# Patient Record
Sex: FEMALE | Race: WHITE | Employment: FULL TIME
[De-identification: names, ages, dates, MRNs, and addresses within clinical notes are randomized per-mention and may not be internally consistent; named-entity substitution may affect disease eponyms.]

---

## 2017-01-17 ENCOUNTER — OFFICE VISIT (OUTPATIENT)
Dept: FAMILY MEDICINE CLINIC | Facility: CLINIC | Age: 52
End: 2017-01-17

## 2017-01-17 VITALS
DIASTOLIC BLOOD PRESSURE: 80 MMHG | WEIGHT: 265 LBS | TEMPERATURE: 97.7 F | HEART RATE: 76 BPM | SYSTOLIC BLOOD PRESSURE: 122 MMHG | BODY MASS INDEX: 50.07 KG/M2

## 2017-01-17 DIAGNOSIS — J40 BRONCHITIS: Primary | ICD-10-CM

## 2017-01-17 DIAGNOSIS — Z23 NEEDS FLU SHOT: ICD-10-CM

## 2017-01-17 DIAGNOSIS — T36.95XA ANTIBIOTIC-INDUCED YEAST INFECTION: ICD-10-CM

## 2017-01-17 DIAGNOSIS — B37.9 ANTIBIOTIC-INDUCED YEAST INFECTION: ICD-10-CM

## 2017-01-17 PROCEDURE — G8484 FLU IMMUNIZE NO ADMIN: HCPCS | Performed by: NURSE PRACTITIONER

## 2017-01-17 PROCEDURE — 3017F COLORECTAL CA SCREEN DOC REV: CPT | Performed by: NURSE PRACTITIONER

## 2017-01-17 PROCEDURE — 90471 IMMUNIZATION ADMIN: CPT | Performed by: NURSE PRACTITIONER

## 2017-01-17 PROCEDURE — 1036F TOBACCO NON-USER: CPT | Performed by: NURSE PRACTITIONER

## 2017-01-17 PROCEDURE — 99213 OFFICE O/P EST LOW 20 MIN: CPT | Performed by: NURSE PRACTITIONER

## 2017-01-17 PROCEDURE — 3014F SCREEN MAMMO DOC REV: CPT | Performed by: NURSE PRACTITIONER

## 2017-01-17 PROCEDURE — G8427 DOCREV CUR MEDS BY ELIG CLIN: HCPCS | Performed by: NURSE PRACTITIONER

## 2017-01-17 PROCEDURE — 90688 IIV4 VACCINE SPLT 0.5 ML IM: CPT | Performed by: NURSE PRACTITIONER

## 2017-01-17 PROCEDURE — G8419 CALC BMI OUT NRM PARAM NOF/U: HCPCS | Performed by: NURSE PRACTITIONER

## 2017-01-17 RX ORDER — DOXYCYCLINE HYCLATE 100 MG
100 TABLET ORAL 2 TIMES DAILY
Qty: 20 TABLET | Refills: 0 | Status: SHIPPED | OUTPATIENT
Start: 2017-01-17 | End: 2017-01-27

## 2017-01-17 RX ORDER — ALBUTEROL SULFATE 90 UG/1
2 AEROSOL, METERED RESPIRATORY (INHALATION) EVERY 6 HOURS PRN
Qty: 1 INHALER | Refills: 3 | Status: SHIPPED | OUTPATIENT
Start: 2017-01-17 | End: 2018-11-30 | Stop reason: ALTCHOICE

## 2017-01-17 RX ORDER — FLUCONAZOLE 150 MG/1
150 TABLET ORAL DAILY
Qty: 3 TABLET | Refills: 0 | Status: SHIPPED | OUTPATIENT
Start: 2017-01-17 | End: 2017-01-20

## 2017-01-17 ASSESSMENT — ENCOUNTER SYMPTOMS
ABDOMINAL PAIN: 0
NAUSEA: 0
CONSTIPATION: 0
WHEEZING: 1
SORE THROAT: 0
SINUS PRESSURE: 0
RHINORRHEA: 1
CHEST TIGHTNESS: 1
SHORTNESS OF BREATH: 1
VOICE CHANGE: 1
COUGH: 1
DIARRHEA: 0

## 2017-01-17 ASSESSMENT — PATIENT HEALTH QUESTIONNAIRE - PHQ9
2. FEELING DOWN, DEPRESSED OR HOPELESS: 0
SUM OF ALL RESPONSES TO PHQ QUESTIONS 1-9: 0
SUM OF ALL RESPONSES TO PHQ9 QUESTIONS 1 & 2: 0
1. LITTLE INTEREST OR PLEASURE IN DOING THINGS: 0

## 2017-01-18 ENCOUNTER — PATIENT MESSAGE (OUTPATIENT)
Dept: FAMILY MEDICINE CLINIC | Facility: CLINIC | Age: 52
End: 2017-01-18

## 2017-01-18 ENCOUNTER — TELEPHONE (OUTPATIENT)
Dept: FAMILY MEDICINE CLINIC | Facility: CLINIC | Age: 52
End: 2017-01-18

## 2017-03-05 DIAGNOSIS — E55.9 VITAMIN D DEFICIENCY: Primary | ICD-10-CM

## 2017-03-05 DIAGNOSIS — E78.2 MIXED HYPERLIPIDEMIA: ICD-10-CM

## 2017-03-06 RX ORDER — ATORVASTATIN CALCIUM 20 MG/1
20 TABLET, FILM COATED ORAL DAILY
Qty: 30 TABLET | Refills: 1 | Status: SHIPPED | OUTPATIENT
Start: 2017-03-06 | End: 2017-05-05 | Stop reason: SDUPTHER

## 2017-03-22 ENCOUNTER — HOSPITAL ENCOUNTER (OUTPATIENT)
Age: 52
Setting detail: SPECIMEN
Discharge: HOME | End: 2017-03-22
Payer: COMMERCIAL

## 2017-03-22 ENCOUNTER — OFFICE VISIT (OUTPATIENT)
Dept: FAMILY MEDICINE CLINIC | Age: 52
End: 2017-03-22
Payer: COMMERCIAL

## 2017-03-22 VITALS
WEIGHT: 258 LBS | DIASTOLIC BLOOD PRESSURE: 82 MMHG | SYSTOLIC BLOOD PRESSURE: 118 MMHG | BODY MASS INDEX: 48.75 KG/M2 | RESPIRATION RATE: 18 BRPM | HEART RATE: 88 BPM | TEMPERATURE: 99.2 F

## 2017-03-22 DIAGNOSIS — E78.2 MIXED HYPERLIPIDEMIA: ICD-10-CM

## 2017-03-22 DIAGNOSIS — J20.9 ACUTE BRONCHITIS, UNSPECIFIED ORGANISM: Primary | ICD-10-CM

## 2017-03-22 DIAGNOSIS — R73.01 IMPAIRED FASTING BLOOD SUGAR: Primary | ICD-10-CM

## 2017-03-22 DIAGNOSIS — Z11.59 NEED FOR HEPATITIS C SCREENING TEST: ICD-10-CM

## 2017-03-22 DIAGNOSIS — E55.9 VITAMIN D DEFICIENCY: ICD-10-CM

## 2017-03-22 LAB
ALBUMIN SERPL-MCNC: 4.3 G/DL (ref 3.5–5.2)
ALBUMIN/GLOBULIN RATIO: 1.3 (ref 1–2.5)
ALP BLD-CCNC: 83 U/L (ref 35–104)
ALT SERPL-CCNC: 28 U/L (ref 5–33)
ANION GAP SERPL CALCULATED.3IONS-SCNC: 16 MMOL/L (ref 9–17)
AST SERPL-CCNC: 21 U/L
BILIRUB SERPL-MCNC: 0.4 MG/DL (ref 0.3–1.2)
BUN BLDV-MCNC: 15 MG/DL (ref 6–20)
BUN/CREAT BLD: ABNORMAL (ref 9–20)
CALCIUM SERPL-MCNC: 9.1 MG/DL (ref 8.6–10.4)
CHLORIDE BLD-SCNC: 102 MMOL/L (ref 98–107)
CHOLESTEROL/HDL RATIO: 2.8
CHOLESTEROL: 163 MG/DL
CO2: 23 MMOL/L (ref 20–31)
CREAT SERPL-MCNC: 0.51 MG/DL (ref 0.5–0.9)
GFR AFRICAN AMERICAN: >60 ML/MIN
GFR NON-AFRICAN AMERICAN: >60 ML/MIN
GFR SERPL CREATININE-BSD FRML MDRD: ABNORMAL ML/MIN/{1.73_M2}
GFR SERPL CREATININE-BSD FRML MDRD: ABNORMAL ML/MIN/{1.73_M2}
GLUCOSE BLD-MCNC: 103 MG/DL (ref 70–99)
HCT VFR BLD CALC: 38.4 % (ref 36–46)
HDLC SERPL-MCNC: 58 MG/DL
HEMOGLOBIN: 12.3 G/DL (ref 12–16)
HEPATITIS C ANTIBODY: NONREACTIVE
LDL CHOLESTEROL: 78 MG/DL (ref 0–130)
MCH RBC QN AUTO: 19.5 PG (ref 26–34)
MCHC RBC AUTO-ENTMCNC: 32.1 G/DL (ref 31–37)
MCV RBC AUTO: 60.8 FL (ref 80–100)
PDW BLD-RTO: 17.5 % (ref 12.5–15.4)
PLATELET # BLD: 222 K/UL (ref 140–450)
PMV BLD AUTO: 8.9 FL (ref 6–12)
POTASSIUM SERPL-SCNC: 4.3 MMOL/L (ref 3.7–5.3)
RBC # BLD: 6.31 M/UL (ref 4–5.2)
SODIUM BLD-SCNC: 141 MMOL/L (ref 135–144)
TOTAL PROTEIN: 7.6 G/DL (ref 6.4–8.3)
TRIGL SERPL-MCNC: 133 MG/DL
VITAMIN D 25-HYDROXY: 16.6 NG/ML (ref 30–100)
VLDLC SERPL CALC-MCNC: NORMAL MG/DL (ref 1–30)
WBC # BLD: 5 K/UL (ref 3.5–11)

## 2017-03-22 PROCEDURE — 99213 OFFICE O/P EST LOW 20 MIN: CPT | Performed by: NURSE PRACTITIONER

## 2017-03-22 PROCEDURE — 1036F TOBACCO NON-USER: CPT | Performed by: NURSE PRACTITIONER

## 2017-03-22 PROCEDURE — G8419 CALC BMI OUT NRM PARAM NOF/U: HCPCS | Performed by: NURSE PRACTITIONER

## 2017-03-22 PROCEDURE — G8427 DOCREV CUR MEDS BY ELIG CLIN: HCPCS | Performed by: NURSE PRACTITIONER

## 2017-03-22 PROCEDURE — 3014F SCREEN MAMMO DOC REV: CPT | Performed by: NURSE PRACTITIONER

## 2017-03-22 PROCEDURE — 3017F COLORECTAL CA SCREEN DOC REV: CPT | Performed by: NURSE PRACTITIONER

## 2017-03-22 PROCEDURE — G8484 FLU IMMUNIZE NO ADMIN: HCPCS | Performed by: NURSE PRACTITIONER

## 2017-03-22 RX ORDER — METHYLPREDNISOLONE 4 MG/1
TABLET ORAL
Qty: 1 KIT | Refills: 0 | Status: SHIPPED | OUTPATIENT
Start: 2017-03-22 | End: 2017-03-28

## 2017-03-22 RX ORDER — AMOXICILLIN AND CLAVULANATE POTASSIUM 875; 125 MG/1; MG/1
1 TABLET, FILM COATED ORAL 2 TIMES DAILY
Qty: 20 TABLET | Refills: 0 | Status: SHIPPED | OUTPATIENT
Start: 2017-03-22 | End: 2017-04-01

## 2017-03-22 ASSESSMENT — ENCOUNTER SYMPTOMS
NAUSEA: 0
ABDOMINAL PAIN: 0
SINUS PRESSURE: 0
WHEEZING: 1
VOICE CHANGE: 1
VOMITING: 0
SINUS PAIN: 0
SORE THROAT: 1
CHEST TIGHTNESS: 1
SHORTNESS OF BREATH: 1
COUGH: 1
DIARRHEA: 0
RHINORRHEA: 0

## 2017-03-23 LAB
ESTIMATED AVERAGE GLUCOSE: 114 MG/DL
HBA1C MFR BLD: 5.6 % (ref 4–6)

## 2017-04-19 ENCOUNTER — OFFICE VISIT (OUTPATIENT)
Dept: FAMILY MEDICINE CLINIC | Age: 52
End: 2017-04-19
Payer: COMMERCIAL

## 2017-04-19 VITALS
HEART RATE: 72 BPM | DIASTOLIC BLOOD PRESSURE: 88 MMHG | SYSTOLIC BLOOD PRESSURE: 130 MMHG | WEIGHT: 261 LBS | HEIGHT: 61 IN | TEMPERATURE: 97.4 F | RESPIRATION RATE: 20 BRPM | BODY MASS INDEX: 49.28 KG/M2

## 2017-04-19 DIAGNOSIS — B00.1 COLD SORE: ICD-10-CM

## 2017-04-19 DIAGNOSIS — J06.9 URI, ACUTE: Primary | ICD-10-CM

## 2017-04-19 DIAGNOSIS — R05.9 COUGH: ICD-10-CM

## 2017-04-19 PROCEDURE — 99213 OFFICE O/P EST LOW 20 MIN: CPT | Performed by: NURSE PRACTITIONER

## 2017-04-19 PROCEDURE — 1036F TOBACCO NON-USER: CPT | Performed by: NURSE PRACTITIONER

## 2017-04-19 PROCEDURE — 3017F COLORECTAL CA SCREEN DOC REV: CPT | Performed by: NURSE PRACTITIONER

## 2017-04-19 PROCEDURE — 3014F SCREEN MAMMO DOC REV: CPT | Performed by: NURSE PRACTITIONER

## 2017-04-19 PROCEDURE — G8419 CALC BMI OUT NRM PARAM NOF/U: HCPCS | Performed by: NURSE PRACTITIONER

## 2017-04-19 PROCEDURE — G8427 DOCREV CUR MEDS BY ELIG CLIN: HCPCS | Performed by: NURSE PRACTITIONER

## 2017-04-19 RX ORDER — GUAIFENESIN 600 MG/1
600 TABLET, EXTENDED RELEASE ORAL 2 TIMES DAILY
Qty: 20 TABLET | Refills: 0 | Status: SHIPPED | OUTPATIENT
Start: 2017-04-19 | End: 2017-04-29

## 2017-04-19 RX ORDER — DOCOSANOL 100 MG/G
CREAM TOPICAL
Qty: 1 TUBE | Refills: 0 | COMMUNITY
Start: 2017-04-19 | End: 2017-04-26

## 2017-04-19 ASSESSMENT — ENCOUNTER SYMPTOMS
SINUS PAIN: 0
EYE DISCHARGE: 0
RHINORRHEA: 0
CONSTIPATION: 0
TROUBLE SWALLOWING: 0
SHORTNESS OF BREATH: 1
VOMITING: 0
COUGH: 1
ABDOMINAL PAIN: 0
EYE REDNESS: 0
SWOLLEN GLANDS: 0
SORE THROAT: 0
DIARRHEA: 0
WHEEZING: 1

## 2017-05-05 DIAGNOSIS — E78.2 MIXED HYPERLIPIDEMIA: ICD-10-CM

## 2017-05-05 RX ORDER — ATORVASTATIN CALCIUM 20 MG/1
20 TABLET, FILM COATED ORAL DAILY
Qty: 30 TABLET | Refills: 2 | Status: SHIPPED | OUTPATIENT
Start: 2017-05-05 | End: 2017-08-07 | Stop reason: SDUPTHER

## 2017-08-07 DIAGNOSIS — E78.2 MIXED HYPERLIPIDEMIA: ICD-10-CM

## 2017-08-07 RX ORDER — ATORVASTATIN CALCIUM 20 MG/1
20 TABLET, FILM COATED ORAL DAILY
Qty: 30 TABLET | Refills: 5 | Status: SHIPPED | OUTPATIENT
Start: 2017-08-07 | End: 2018-02-15 | Stop reason: SDUPTHER

## 2017-09-26 ENCOUNTER — OFFICE VISIT (OUTPATIENT)
Dept: FAMILY MEDICINE CLINIC | Age: 52
End: 2017-09-26
Payer: COMMERCIAL

## 2017-09-26 VITALS
WEIGHT: 258.6 LBS | RESPIRATION RATE: 16 BRPM | DIASTOLIC BLOOD PRESSURE: 80 MMHG | SYSTOLIC BLOOD PRESSURE: 130 MMHG | HEIGHT: 61 IN | BODY MASS INDEX: 48.82 KG/M2 | HEART RATE: 80 BPM

## 2017-09-26 DIAGNOSIS — H00.014 HORDEOLUM EXTERNUM OF LEFT UPPER EYELID: Primary | ICD-10-CM

## 2017-09-26 PROCEDURE — G8427 DOCREV CUR MEDS BY ELIG CLIN: HCPCS | Performed by: INTERNAL MEDICINE

## 2017-09-26 PROCEDURE — 3014F SCREEN MAMMO DOC REV: CPT | Performed by: INTERNAL MEDICINE

## 2017-09-26 PROCEDURE — 1036F TOBACCO NON-USER: CPT | Performed by: INTERNAL MEDICINE

## 2017-09-26 PROCEDURE — 3017F COLORECTAL CA SCREEN DOC REV: CPT | Performed by: INTERNAL MEDICINE

## 2017-09-26 PROCEDURE — 99212 OFFICE O/P EST SF 10 MIN: CPT | Performed by: INTERNAL MEDICINE

## 2017-09-26 PROCEDURE — G8417 CALC BMI ABV UP PARAM F/U: HCPCS | Performed by: INTERNAL MEDICINE

## 2017-09-26 RX ORDER — BACITRACIN 500 [USP'U]/G
OINTMENT OPHTHALMIC 3 TIMES DAILY
Qty: 1 TUBE | Refills: 0 | Status: SHIPPED | OUTPATIENT
Start: 2017-09-26 | End: 2017-10-01

## 2017-09-26 ASSESSMENT — ENCOUNTER SYMPTOMS: SORE THROAT: 0

## 2017-09-27 ENCOUNTER — TELEPHONE (OUTPATIENT)
Dept: FAMILY MEDICINE CLINIC | Age: 52
End: 2017-09-27

## 2017-09-27 ENCOUNTER — NURSE TRIAGE (OUTPATIENT)
Dept: OTHER | Age: 52
End: 2017-09-27

## 2017-10-17 DIAGNOSIS — R22.43 LOCALIZED SWELLING OF BOTH LOWER LEGS: ICD-10-CM

## 2017-10-18 NOTE — TELEPHONE ENCOUNTER
Why does she need this prescription? Has swelling in legs become a problem again? Usually it gets worse in warmer months and better in cooler. Just wondering if I need to look into other causes of swelling. Is she trying to use it for weight loss?

## 2017-10-18 NOTE — TELEPHONE ENCOUNTER
LV 5/5/16  LRF 8/7/17    Health Maintenance   Topic Date Due    HIV screen  09/15/1980    DTaP/Tdap/Td vaccine (1 - Tdap) 09/15/1984    Cervical cancer screen  03/01/2015    Flu vaccine (1) 09/01/2017    Breast cancer screen  05/20/2018    Diabetes screen  03/22/2020    Lipid screen  03/22/2022    Colon cancer screen colonoscopy  07/21/2027    Hepatitis C screen  Completed             (applicable per patient's age: Cancer Screenings, Depression Screening, Fall Risk Screening, Immunizations)    Hemoglobin A1C (%)   Date Value   03/22/2017 5.6   02/03/2016 5.4   10/16/2013 5.8     LDL Cholesterol (mg/dL)   Date Value   03/22/2017 78     LDL Calculated (mg/dL)   Date Value   08/06/2014 111     AST (U/L)   Date Value   03/22/2017 21     ALT (U/L)   Date Value   03/22/2017 28     BUN (mg/dL)   Date Value   03/22/2017 15      (goal A1C is < 7)   (goal LDL is <100) need 30-50% reduction from baseline     BP Readings from Last 3 Encounters:   09/26/17 130/80   04/19/17 130/88   03/22/17 118/82    (goal /80)      All Future Testing planned in [facility]:  Lab Frequency Next Occurrence       Next Visit Date:  Future Appointments  Date Time Provider Zay Canas   11/3/2017 1:00 PM JUAN FRANCISCO Dela Cruz [facility]            Patient Active Problem List:     GERD (gastroesophageal reflux disease)     Hyperlipidemia     Gastric ulcer     Beta thalassemia minor     Varicose veins     Lumbago     Vitamin D deficiency     Traumatic compression fracture of T11 thoracic vertebra ([facility] [address]

## 2017-10-19 RX ORDER — FUROSEMIDE 20 MG/1
20 TABLET ORAL DAILY
Qty: 30 TABLET | Refills: 2 | Status: SHIPPED | OUTPATIENT
Start: 2017-10-19 | End: 2020-11-17

## 2017-11-03 ENCOUNTER — HOSPITAL ENCOUNTER (OUTPATIENT)
Age: 52
Setting detail: SPECIMEN
Discharge: HOME | End: 2017-11-03
Payer: COMMERCIAL

## 2017-11-03 ENCOUNTER — OFFICE VISIT (OUTPATIENT)
Dept: FAMILY MEDICINE CLINIC | Age: 52
End: 2017-11-03
Payer: COMMERCIAL

## 2017-11-03 VITALS
HEART RATE: 82 BPM | TEMPERATURE: 97.5 F | WEIGHT: 265.8 LBS | RESPIRATION RATE: 22 BRPM | BODY MASS INDEX: 50.18 KG/M2 | HEIGHT: 61 IN | DIASTOLIC BLOOD PRESSURE: 88 MMHG | SYSTOLIC BLOOD PRESSURE: 123 MMHG

## 2017-11-03 DIAGNOSIS — Z23 NEED FOR DIPHTHERIA-TETANUS-PERTUSSIS (TDAP) VACCINE: ICD-10-CM

## 2017-11-03 DIAGNOSIS — E55.9 VITAMIN D DEFICIENCY: ICD-10-CM

## 2017-11-03 DIAGNOSIS — Z00.00 ROUTINE GENERAL MEDICAL EXAMINATION AT A HEALTH CARE FACILITY: ICD-10-CM

## 2017-11-03 DIAGNOSIS — M85.88 OSTEOPENIA OF SPINE: ICD-10-CM

## 2017-11-03 DIAGNOSIS — R07.9 CHEST PAIN, UNSPECIFIED TYPE: ICD-10-CM

## 2017-11-03 DIAGNOSIS — R63.5 ABNORMAL WEIGHT GAIN: ICD-10-CM

## 2017-11-03 DIAGNOSIS — E78.00 PURE HYPERCHOLESTEROLEMIA: ICD-10-CM

## 2017-11-03 DIAGNOSIS — Z00.00 ROUTINE GENERAL MEDICAL EXAMINATION AT A HEALTH CARE FACILITY: Primary | ICD-10-CM

## 2017-11-03 LAB
ALBUMIN SERPL-MCNC: 4.3 G/DL (ref 3.5–5.2)
ALBUMIN/GLOBULIN RATIO: 1.6 (ref 1–2.5)
ALP BLD-CCNC: 81 U/L (ref 35–104)
ALT SERPL-CCNC: 25 U/L (ref 5–33)
ANION GAP SERPL CALCULATED.3IONS-SCNC: 10 MMOL/L (ref 9–17)
AST SERPL-CCNC: 21 U/L
BILIRUB SERPL-MCNC: 0.63 MG/DL (ref 0.3–1.2)
BUN BLDV-MCNC: 12 MG/DL (ref 6–20)
BUN/CREAT BLD: ABNORMAL (ref 9–20)
CALCIUM SERPL-MCNC: 9.5 MG/DL (ref 8.6–10.4)
CHLORIDE BLD-SCNC: 100 MMOL/L (ref 98–107)
CHOLESTEROL/HDL RATIO: 3.2
CHOLESTEROL: 208 MG/DL
CO2: 27 MMOL/L (ref 20–31)
CREAT SERPL-MCNC: 0.49 MG/DL (ref 0.5–0.9)
ESTIMATED AVERAGE GLUCOSE: 111 MG/DL
GFR AFRICAN AMERICAN: >60 ML/MIN
GFR NON-AFRICAN AMERICAN: >60 ML/MIN
GFR SERPL CREATININE-BSD FRML MDRD: ABNORMAL ML/MIN/{1.73_M2}
GFR SERPL CREATININE-BSD FRML MDRD: ABNORMAL ML/MIN/{1.73_M2}
GLUCOSE BLD-MCNC: 94 MG/DL (ref 70–99)
HBA1C MFR BLD: 5.5 % (ref 4–6)
HCT VFR BLD CALC: 41 % (ref 36.3–47.1)
HDLC SERPL-MCNC: 66 MG/DL
HEMOGLOBIN: 12 G/DL (ref 11.9–15.1)
LDL CHOLESTEROL: 106 MG/DL (ref 0–130)
MCH RBC QN AUTO: 19.4 PG (ref 25.2–33.5)
MCHC RBC AUTO-ENTMCNC: 29.3 G/DL (ref 29.9–34.7)
MCV RBC AUTO: 66.5 FL (ref 82.6–102.9)
PDW BLD-RTO: 17.4 % (ref 11.8–14.4)
PLATELET # BLD: 322 K/UL (ref 138–453)
PMV BLD AUTO: 10.7 FL (ref 8.1–13.5)
POTASSIUM SERPL-SCNC: 4.7 MMOL/L (ref 3.7–5.3)
RBC # BLD: 6.17 M/UL (ref 3.95–5.11)
SODIUM BLD-SCNC: 137 MMOL/L (ref 135–144)
TOTAL PROTEIN: 7 G/DL (ref 6.4–8.3)
TRIGL SERPL-MCNC: 179 MG/DL
VITAMIN D 25-HYDROXY: 21.1 NG/ML (ref 30–100)
VLDLC SERPL CALC-MCNC: ABNORMAL MG/DL (ref 1–30)
WBC # BLD: 6.1 K/UL (ref 3.5–11.3)

## 2017-11-03 PROCEDURE — 93000 ELECTROCARDIOGRAM COMPLETE: CPT | Performed by: NURSE PRACTITIONER

## 2017-11-03 PROCEDURE — 99396 PREV VISIT EST AGE 40-64: CPT | Performed by: NURSE PRACTITIONER

## 2017-11-03 PROCEDURE — 90471 IMMUNIZATION ADMIN: CPT | Performed by: NURSE PRACTITIONER

## 2017-11-03 PROCEDURE — 90715 TDAP VACCINE 7 YRS/> IM: CPT | Performed by: NURSE PRACTITIONER

## 2017-11-03 RX ORDER — PHENTERMINE HYDROCHLORIDE 37.5 MG/1
37.5 TABLET ORAL
Qty: 30 TABLET | Refills: 0 | Status: SHIPPED | OUTPATIENT
Start: 2017-11-03 | End: 2017-12-08 | Stop reason: SDUPTHER

## 2017-11-03 RX ORDER — NAPROXEN 500 MG/1
500 TABLET ORAL 2 TIMES DAILY PRN
Qty: 60 TABLET | Refills: 3 | Status: SHIPPED | OUTPATIENT
Start: 2017-11-03 | End: 2018-03-12 | Stop reason: SDUPTHER

## 2017-11-03 ASSESSMENT — ENCOUNTER SYMPTOMS
EYE PAIN: 0
RHINORRHEA: 0
ABDOMINAL PAIN: 0
TROUBLE SWALLOWING: 0
BACK PAIN: 0
CONSTIPATION: 0
VOMITING: 0
SORE THROAT: 0
EYE DISCHARGE: 0
SHORTNESS OF BREATH: 0
COUGH: 0
DIARRHEA: 0
NAUSEA: 0
WHEEZING: 0

## 2017-11-03 NOTE — PROGRESS NOTES
Subjective:      Patient ID: Leni Oppenheim is a 46 y.o. female. Visit Information    Have you changed or started any medications since your last visit including any over-the-counter medicines, vitamins, or herbal medicines? no   Are you having any side effects from any of your medications? -  no  Have you stopped taking any of your medications? Is so, why? -  no    Have you seen any other physician or provider since your last visit? Yes - Records Obtained  Have you had any other diagnostic tests since your last visit? Yes - Records Obtained  Have you been seen in the emergency room and/or had an admission to a hospital since we last saw you? Yes - Records Obtained  Have you had your routine dental cleaning in the past 6 months? yes - routinely     Have you activated your [employer] account? If not, what are your barriers? Yes     Patient Care Team:  Alejandra Tafoya MD as PCP - General    Medical History Review  Past Medical, Family, and Social History reviewed and does contribute to the patient presenting condition    Health Maintenance   Topic Date Due    HIV screen  09/15/1980    DTaP/Tdap/Td vaccine (1 - Tdap) 09/15/1984    Cervical cancer screen  03/01/2015    Flu vaccine (1) 09/01/2017    Breast cancer screen  05/20/2018    Diabetes screen  03/22/2020    Lipid screen  03/22/2022    Colon cancer screen colonoscopy  07/21/2027    Hepatitis C screen  Completed     Patient present in office today for routine follow up on chronic conditions including: Gerd, hyperlipidemia, chronic back pain, and mood dysphoria. Has concerns of back pain, hand aches, and increasing weight. Pap wasn't done by Elver Kim in May due to total hysterectomy. Mammogram completed in May. Prabha Jenkins in October, X-rayed her hand and arm. Negative for fractures. Refused flu shot. Chest pain with stress in the last few weeks. Happening a few times a week. Pressure. Non radiating. Sometimes last all day. Hasn't had an EKG recently.  No other received counseling on the following healthy behaviors: nutrition, exercise and medication adherence  2. Reviewed prior labs and health maintenance  3. Continue current medications, diet and exercise. 4.  Discussed use, benefit, and side effects of prescribed medications. Barriers to medication compliance addressed. All patient questions answered. Pt voiced understanding. 5.  Patient given educational materials - see patient instructions  6. Was a self-tracking handout given in paper form or via [employer]? No  If yes, see orders or list here.     PHQ Scores 1/17/2017   PHQ2 Score 0   PHQ9 Score 0     Interpretation of Total Score Depression Severity: 1-4 = Minimal depression, 5-9 = Mild depression, 10-14 = Moderate depression, 15-19 = Moderately severe depression, 20-27 = Severe depression  Normal    Completed Refills   Requested Prescriptions     Pending Prescriptions Disp Refills    Cholecalciferol (VITAMIN D3) 5000 units TABS 30 tablet 5     Sig: Take 1 tablet by mouth daily    phentermine (ADIPEX-P) 37.5 MG tablet 30 tablet 0     Sig: Take 1 tablet by mouth every morning (before breakfast)     Signed Prescriptions Disp Refills    naproxen (NAPROSYN) 500 MG tablet 60 tablet 3     Sig: Take 1 tablet by mouth 2 times daily as needed for Pain

## 2017-12-07 ENCOUNTER — OFFICE VISIT (OUTPATIENT)
Dept: FAMILY MEDICINE CLINIC | Age: 52
End: 2017-12-07
Payer: COMMERCIAL

## 2017-12-07 VITALS
HEART RATE: 75 BPM | TEMPERATURE: 97.1 F | DIASTOLIC BLOOD PRESSURE: 84 MMHG | SYSTOLIC BLOOD PRESSURE: 125 MMHG | BODY MASS INDEX: 49.2 KG/M2 | WEIGHT: 260.4 LBS | RESPIRATION RATE: 20 BRPM

## 2017-12-07 DIAGNOSIS — R68.89 ABNORMAL WEIGHT: Primary | ICD-10-CM

## 2017-12-07 PROCEDURE — G8427 DOCREV CUR MEDS BY ELIG CLIN: HCPCS | Performed by: NURSE PRACTITIONER

## 2017-12-07 PROCEDURE — 3014F SCREEN MAMMO DOC REV: CPT | Performed by: NURSE PRACTITIONER

## 2017-12-07 PROCEDURE — 99214 OFFICE O/P EST MOD 30 MIN: CPT | Performed by: NURSE PRACTITIONER

## 2017-12-07 PROCEDURE — G8417 CALC BMI ABV UP PARAM F/U: HCPCS | Performed by: NURSE PRACTITIONER

## 2017-12-07 PROCEDURE — 1036F TOBACCO NON-USER: CPT | Performed by: NURSE PRACTITIONER

## 2017-12-07 PROCEDURE — G8484 FLU IMMUNIZE NO ADMIN: HCPCS | Performed by: NURSE PRACTITIONER

## 2017-12-07 PROCEDURE — 3017F COLORECTAL CA SCREEN DOC REV: CPT | Performed by: NURSE PRACTITIONER

## 2017-12-07 ASSESSMENT — ENCOUNTER SYMPTOMS
TROUBLE SWALLOWING: 0
CONSTIPATION: 0
BACK PAIN: 0
SHORTNESS OF BREATH: 0
RHINORRHEA: 0
ABDOMINAL PAIN: 0
NAUSEA: 0
VOMITING: 0
SORE THROAT: 0
COUGH: 0
DIARRHEA: 0

## 2017-12-07 NOTE — PROGRESS NOTES
phentermine (ADIPEX-P) 37.5 MG tablet           Plan:      Continue Adipex daily as prescribed  Continue to work on healthier diet  Needs to work on regular exercise  Body mass index is 49.2 kg/m². Encouraged continued weight loss. Call office with concerns        1. Patricia Ash received counseling on the following healthy behaviors: nutrition, exercise and medication adherence  2. Reviewed prior labs and health maintenance  3. Continue current medications, diet and exercise. 4.  Discussed use, benefit, and side effects of prescribed medications. Barriers to medication compliance addressed. All patient questions answered. Pt voiced understanding. 5.  Patient given educational materials - see patient instructions  6. Was a self-tracking handout given in paper form or via [employer]? No  If yes, see orders or list here. PHQ Scores 1/17/2017   PHQ2 Score 0   PHQ9 Score 0     Interpretation of Total Score Depression Severity: 1-4 = Minimal depression, 5-9 = Mild depression, 10-14 = Moderate depression, 15-19 = Moderately severe depression, 20-27 = Severe depression  Normal    Completed Refills   Requested Prescriptions     Signed Prescriptions Disp Refills    phentermine (ADIPEX-P) 37.5 MG tablet 30 tablet 0     Sig: Take 1 tablet by mouth every morning (before breakfast) .

## 2017-12-08 RX ORDER — PHENTERMINE HYDROCHLORIDE 37.5 MG/1
37.5 TABLET ORAL
Qty: 30 TABLET | Refills: 0 | Status: SHIPPED | OUTPATIENT
Start: 2017-12-08 | End: 2018-01-07

## 2018-01-20 ENCOUNTER — OFFICE VISIT (OUTPATIENT)
Dept: FAMILY MEDICINE CLINIC | Age: 53
End: 2018-01-20
Payer: COMMERCIAL

## 2018-01-20 VITALS
DIASTOLIC BLOOD PRESSURE: 84 MMHG | HEIGHT: 61 IN | BODY MASS INDEX: 49.47 KG/M2 | WEIGHT: 262 LBS | TEMPERATURE: 98 F | OXYGEN SATURATION: 96 % | SYSTOLIC BLOOD PRESSURE: 126 MMHG | HEART RATE: 78 BPM

## 2018-01-20 DIAGNOSIS — J02.9 SORE THROAT: Primary | ICD-10-CM

## 2018-01-20 DIAGNOSIS — R52 BODY ACHES: ICD-10-CM

## 2018-01-20 DIAGNOSIS — J10.1 INFLUENZA B: ICD-10-CM

## 2018-01-20 PROCEDURE — 87880 STREP A ASSAY W/OPTIC: CPT | Performed by: NURSE PRACTITIONER

## 2018-01-20 PROCEDURE — 87804 INFLUENZA ASSAY W/OPTIC: CPT | Performed by: NURSE PRACTITIONER

## 2018-01-20 PROCEDURE — 99213 OFFICE O/P EST LOW 20 MIN: CPT | Performed by: NURSE PRACTITIONER

## 2018-01-20 RX ORDER — OSELTAMIVIR PHOSPHATE 75 MG/1
75 CAPSULE ORAL 2 TIMES DAILY
Qty: 10 CAPSULE | Refills: 0 | Status: SHIPPED | OUTPATIENT
Start: 2018-01-20 | End: 2018-01-25

## 2018-01-20 ASSESSMENT — ENCOUNTER SYMPTOMS
NAUSEA: 1
SORE THROAT: 1
VOMITING: 0
EYES NEGATIVE: 1
SHORTNESS OF BREATH: 0
COUGH: 1
TROUBLE SWALLOWING: 1

## 2018-01-20 ASSESSMENT — PATIENT HEALTH QUESTIONNAIRE - PHQ9
2. FEELING DOWN, DEPRESSED OR HOPELESS: 0
SUM OF ALL RESPONSES TO PHQ9 QUESTIONS 1 & 2: 0
SUM OF ALL RESPONSES TO PHQ QUESTIONS 1-9: 0
1. LITTLE INTEREST OR PLEASURE IN DOING THINGS: 0

## 2018-01-20 NOTE — PROGRESS NOTES
10    Beta thalassemia minor     Depression     MILD    Diverticulitis     past    E-coli UTI     Fall     AT HOME 3 WEEKS AGO    Gastric ulcer     resolved    GERD (gastroesophageal reflux disease)     resolved    Hyperlipidemia     PONV (postoperative nausea and vomiting)     REQUEST PREOP MEDS FOR N/V    Snores     Wears glasses      Past Surgical History:   Procedure Laterality Date    BACK SURGERY       SECTION      CHOLECYSTECTOMY      ENDOSCOPY, COLON, DIAGNOSTIC      HYSTERECTOMY      LUMBAR DISCECTOMY      L3-4    OTHER SURGICAL HISTORY  2015    vertebral augmentation T11    SHOULDER SURGERY Left     fx    UPPER GASTROINTESTINAL ENDOSCOPY  2011       Family History   Problem Relation Age of Onset    Diabetes Sister     Other Sister        Social History   Substance Use Topics    Smoking status: Former Smoker     Packs/day: 0.50     Years: 6.00     Types: Cigarettes     Quit date: 2011    Smokeless tobacco: Never Used    Alcohol use Yes      Comment: rarely uses this     Current Outpatient Prescriptions   Medication Sig Dispense Refill    oseltamivir (TAMIFLU) 75 MG capsule Take 1 capsule by mouth 2 times daily for 5 days 10 capsule 0    Cholecalciferol (VITAMIN D3) 5000 units TABS Take 1 tablet by mouth daily 30 tablet 5    naproxen (NAPROSYN) 500 MG tablet Take 1 tablet by mouth 2 times daily as needed for Pain 60 tablet 3    furosemide (LASIX) 20 MG tablet Take 1 tablet by mouth daily 30 tablet 2    atorvastatin (LIPITOR) 20 MG tablet Take 1 tablet by mouth daily 30 tablet 5    albuterol sulfate HFA (VENTOLIN HFA) 108 (90 BASE) MCG/ACT inhaler Inhale 2 puffs into the lungs every 6 hours as needed for Wheezing 1 Inhaler 3    citalopram (CELEXA) 10 MG tablet Take 10 mg by mouth nightly        No current facility-administered medications for this visit.       Allergies   Allergen Reactions    Percocet [Oxycodone-Acetaminophen] Itching    Tramadol Itching    Vicodin [Hydrocodone-Acetaminophen] Itching    Codeine Itching       Health Maintenance   Topic Date Due    HIV screen  09/15/1980    Cervical cancer screen  03/01/2015    Flu vaccine (1) 02/14/2018 (Originally 9/1/2017)    Breast cancer screen  05/20/2018    Potassium monitoring  11/03/2018    Creatinine monitoring  11/03/2018    Lipid screen  11/03/2022    Colon cancer screen colonoscopy  07/21/2027    DTaP/Tdap/Td vaccine (2 - Td) 11/03/2027    Hepatitis C screen  Completed       Review of Systems   Constitutional: Positive for chills, fatigue and fever. HENT: Positive for congestion, sore throat and trouble swallowing. Eyes: Negative. Respiratory: Positive for cough. Negative for shortness of breath. Cardiovascular: Negative for chest pain. Gastrointestinal: Positive for anorexia and nausea. Negative for vomiting. Genitourinary: Negative. Musculoskeletal: Positive for myalgias. Skin: Negative. Neurological: Negative. Psychiatric/Behavioral: Negative. Physical Exam   Constitutional: She is oriented to person, place, and time. Vital signs are normal. She appears well-developed and well-nourished. No distress. HENT:   Head: Normocephalic and atraumatic. Right Ear: Hearing, tympanic membrane, external ear and ear canal normal.   Left Ear: Hearing, tympanic membrane, external ear and ear canal normal.   Nose: Right sinus exhibits no maxillary sinus tenderness and no frontal sinus tenderness. Left sinus exhibits no maxillary sinus tenderness and no frontal sinus tenderness. Mouth/Throat: Oropharynx is clear and moist and mucous membranes are normal. No posterior oropharyngeal erythema. Eyes: Conjunctivae are normal.   Neck: Trachea normal, normal range of motion and full passive range of motion without pain. Neck supple. Cardiovascular: Normal rate, regular rhythm and normal heart sounds.     Pulmonary/Chest: Effort normal and breath sounds normal. No respiratory distress. Abdominal: Soft. Bowel sounds are normal.   Musculoskeletal: Normal range of motion. Neurological: She is alert and oriented to person, place, and time. Skin: Skin is warm and dry. She is not diaphoretic. Psychiatric: She has a normal mood and affect. Her behavior is normal. Judgment and thought content normal.     /84   Pulse 78   Temp 98 °F (36.7 °C) (Oral)   Ht 5' 1\" (1.549 m)   Wt 262 lb (118.8 kg)   LMP 09/11/2002   SpO2 96%   BMI 49.50 kg/m²          1. Sore throat  POCT rapid strep A   2. Body aches     3. Influenza B  POCT rapid strep A    POCT Influenza A/B    oseltamivir (TAMIFLU) 75 MG capsule         Orders Placed This Encounter   Procedures    POCT rapid strep A    POCT Influenza A/B     Orders Placed This Encounter   Medications    oseltamivir (TAMIFLU) 75 MG capsule     Sig: Take 1 capsule by mouth 2 times daily for 5 days     Dispense:  10 capsule     Refill:  0       Recommended rest, fluids and tylenol for body aches. Patient given educational materials - see patient instructions. Discussed use, benefit, and side effects of prescribed medications. All patient questions were answered. Pt voiced understanding. Patient agreed with treatment plan. Follow up as directed. Patient instructed to go immediately to ED if feeling worse or follow up with PCP if symptoms do not improve.     Electronically signed by Dion Xie NP on 1/20/2018 at 2:49 PM

## 2018-01-22 ENCOUNTER — TELEPHONE (OUTPATIENT)
Dept: FAMILY MEDICINE CLINIC | Age: 53
End: 2018-01-22

## 2018-01-22 NOTE — TELEPHONE ENCOUNTER
Patient called in because she was diagnosed with Influenza A on Saturday and she has taken two doses. Patient states that when she takes the medication she feels nauseous, dizzy, and almost \"drunk\". Patient is wondering if she should continue taking it or stop the medication. Health Maintenance   Topic Date Due    HIV screen  09/15/1980    Cervical cancer screen  03/01/2015    Flu vaccine (1) 02/14/2018 (Originally 9/1/2017)    Breast cancer screen  05/20/2018    Potassium monitoring  11/03/2018    Creatinine monitoring  11/03/2018    Lipid screen  11/03/2022    Colon cancer screen colonoscopy  07/21/2027    DTaP/Tdap/Td vaccine (2 - Td) 11/03/2027    Hepatitis C screen  Completed             (applicable per patient's age: Cancer Screenings, Depression Screening, Fall Risk Screening, Immunizations)    Hemoglobin A1C (%)   Date Value   11/03/2017 5.5   03/22/2017 5.6   02/03/2016 5.4     LDL Cholesterol (mg/dL)   Date Value   11/03/2017 106     LDL Calculated (mg/dL)   Date Value   08/06/2014 111     AST (U/L)   Date Value   11/03/2017 21     ALT (U/L)   Date Value   11/03/2017 25     BUN (mg/dL)   Date Value   11/03/2017 12      (goal A1C is < 7)   (goal LDL is <100) need 30-50% reduction from baseline     BP Readings from Last 3 Encounters:   01/20/18 126/84   12/07/17 125/84   11/03/17 123/88    (goal /80)      All Future Testing planned in [facility]:  Lab Frequency Next Occurrence       Next Visit Date:  No future appointments.          Patient Active Problem List:     GERD (gastroesophageal reflux disease)     Hyperlipidemia     Gastric ulcer     Beta thalassemia minor     Varicose veins     Lumbago     Vitamin D deficiency

## 2018-02-15 DIAGNOSIS — E78.2 MIXED HYPERLIPIDEMIA: ICD-10-CM

## 2018-02-16 RX ORDER — ATORVASTATIN CALCIUM 20 MG/1
20 TABLET, FILM COATED ORAL DAILY
Qty: 30 TABLET | Refills: 5 | Status: SHIPPED | OUTPATIENT
Start: 2018-02-16 | End: 2018-08-18 | Stop reason: SDUPTHER

## 2018-03-13 RX ORDER — NAPROXEN 500 MG/1
500 TABLET ORAL 2 TIMES DAILY PRN
Qty: 60 TABLET | Refills: 5 | Status: SHIPPED | OUTPATIENT
Start: 2018-03-13 | End: 2020-11-17

## 2018-04-25 ENCOUNTER — OFFICE VISIT (OUTPATIENT)
Dept: FAMILY MEDICINE CLINIC | Age: 53
End: 2018-04-25
Payer: COMMERCIAL

## 2018-04-25 VITALS
SYSTOLIC BLOOD PRESSURE: 134 MMHG | TEMPERATURE: 98.9 F | RESPIRATION RATE: 18 BRPM | WEIGHT: 278 LBS | BODY MASS INDEX: 52.53 KG/M2 | DIASTOLIC BLOOD PRESSURE: 78 MMHG | HEART RATE: 68 BPM

## 2018-04-25 DIAGNOSIS — B36.9 FUNGAL DERMATITIS: Primary | ICD-10-CM

## 2018-04-25 DIAGNOSIS — T75.3XXA SEA SICKNESS, INITIAL ENCOUNTER: ICD-10-CM

## 2018-04-25 DIAGNOSIS — Z12.39 BREAST CANCER SCREENING: ICD-10-CM

## 2018-04-25 PROCEDURE — G8427 DOCREV CUR MEDS BY ELIG CLIN: HCPCS | Performed by: NURSE PRACTITIONER

## 2018-04-25 PROCEDURE — 1036F TOBACCO NON-USER: CPT | Performed by: NURSE PRACTITIONER

## 2018-04-25 PROCEDURE — 99213 OFFICE O/P EST LOW 20 MIN: CPT | Performed by: NURSE PRACTITIONER

## 2018-04-25 PROCEDURE — 3017F COLORECTAL CA SCREEN DOC REV: CPT | Performed by: NURSE PRACTITIONER

## 2018-04-25 PROCEDURE — G8417 CALC BMI ABV UP PARAM F/U: HCPCS | Performed by: NURSE PRACTITIONER

## 2018-04-25 RX ORDER — SCOLOPAMINE TRANSDERMAL SYSTEM 1 MG/1
1 PATCH, EXTENDED RELEASE TRANSDERMAL
Qty: 10 PATCH | Refills: 0 | Status: SHIPPED | OUTPATIENT
Start: 2018-04-25 | End: 2018-05-25

## 2018-04-25 RX ORDER — CLOTRIMAZOLE AND BETAMETHASONE DIPROPIONATE 10; .64 MG/G; MG/G
CREAM TOPICAL
Qty: 45 G | Refills: 0 | Status: SHIPPED | OUTPATIENT
Start: 2018-04-25 | End: 2018-05-09

## 2018-04-25 ASSESSMENT — ENCOUNTER SYMPTOMS
CHEST TIGHTNESS: 0
WHEEZING: 0
DIARRHEA: 0
VOMITING: 0
EYE DISCHARGE: 0
RHINORRHEA: 0
SORE THROAT: 0
ABDOMINAL PAIN: 0
COUGH: 0
EYE PAIN: 0
BLOOD IN STOOL: 0
SHORTNESS OF BREATH: 0
SINUS PRESSURE: 0
ABDOMINAL DISTENTION: 0
NAUSEA: 0
EYE REDNESS: 0

## 2018-04-28 ENCOUNTER — OFFICE VISIT (OUTPATIENT)
Dept: URGENT CARE | Facility: CLINIC | Age: 53
End: 2018-04-28
Payer: COMMERCIAL

## 2018-04-28 VITALS
WEIGHT: 270 LBS | DIASTOLIC BLOOD PRESSURE: 68 MMHG | HEIGHT: 60 IN | TEMPERATURE: 97 F | SYSTOLIC BLOOD PRESSURE: 124 MMHG | BODY MASS INDEX: 53.01 KG/M2 | RESPIRATION RATE: 17 BRPM | HEART RATE: 100 BPM | OXYGEN SATURATION: 97 %

## 2018-04-28 DIAGNOSIS — S82.64XA CLOSED NONDISPLACED FRACTURE OF LATERAL MALLEOLUS OF RIGHT FIBULA, INITIAL ENCOUNTER: Primary | ICD-10-CM

## 2018-04-28 DIAGNOSIS — S80.02XA CONTUSION OF LEFT KNEE, INITIAL ENCOUNTER: ICD-10-CM
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
## 2018-04-28 PROCEDURE — 99203 OFFICE O/P NEW LOW 30 MIN: CPT | Mod: S$GLB,,, | Performed by: NURSE PRACTITIONER

## 2018-04-28 RX ORDER — CITALOPRAM 10 MG/1
10 TABLET ORAL DAILY
COMMUNITY

## 2018-04-28 RX ORDER — ATORVASTATIN CALCIUM 20 MG/1
20 TABLET, FILM COATED ORAL DAILY
COMMUNITY

## 2018-04-28 NOTE — PROGRESS NOTES
Subjective:       Patient ID: Martha Oro is a 52 y.o. female.    Vitals:  height is 5' (1.524 m) and weight is 122.5 kg (270 lb). Her temperature is 97.4 °F (36.3 °C). Her blood pressure is 124/68 and her pulse is 100. Her respiration is 17 and oxygen saturation is 97%.     Chief Complaint: Knee Injury (left knee) and Ankle Injury (right ankle)    Pt presents with injury to her left knee and right ankle. Pt states she was walking with her  and she tripped on a loose brick in the side walk and rolled her ankle and hit her knee. Pt states knee just feels and its painful when moving it or walking. Pt states her ankle hurts her more. Pt states she cant move ankle around and cant really bear weight on it. Pt states her right foot feels tingly and she cant move her toes and bend them. Pt denies any trauma to her head or any loss of consciousness.     C/o right ankle pain/swelling- unable to bear weight.   C/o left knee pain- + patella tenderness.     NV intact. + 2 DP and PT pulses bilaterally.     She is here from [place] and is going to get on a cruise ship tomorrow.       Knee Injury   This is a new problem. The current episode started today. The problem occurs constantly. The problem has been unchanged. Pertinent negatives include no abdominal pain, chest pain, neck pain, numbness or weakness.   Ankle Injury    Pertinent negatives include no numbness.     Review of Systems   Constitution: Negative for weakness and malaise/fatigue.   HENT: Negative for nosebleeds.    Cardiovascular: Negative for chest pain and syncope.   Respiratory: Negative for shortness of breath.    Musculoskeletal: Positive for falls and joint pain. Negative for back pain and neck pain.   Gastrointestinal: Negative for abdominal pain.   Genitourinary: Negative for hematuria.   Neurological: Negative for dizziness and numbness.       Objective:      Physical Exam   Constitutional: She is oriented to person, place, and time. She appears  well-developed and well-nourished. No distress.   Morbidly Obese    HENT:   Head: Normocephalic and atraumatic.   Right Ear: External ear normal.   Left Ear: External ear normal.   Nose: Nose normal.   Mouth/Throat: Oropharynx is clear and moist.   Eyes: EOM are normal. Pupils are equal, round, and reactive to light.   Neck: Normal range of motion. Neck supple.   Cardiovascular: Normal rate, regular rhythm, normal heart sounds and intact distal pulses.    Pulmonary/Chest: Effort normal and breath sounds normal. No respiratory distress.   Musculoskeletal: She exhibits edema and tenderness. She exhibits no deformity.        Left hip: Normal.        Right knee: Normal.        Left knee: She exhibits bony tenderness. She exhibits normal range of motion, no swelling, no effusion, no ecchymosis, no deformity, no laceration, no erythema, normal alignment, no LCL laxity, normal meniscus and no MCL laxity.        Right ankle: She exhibits decreased range of motion and swelling (lateral malleolus ). She exhibits no ecchymosis, no deformity, no laceration and normal pulse. Tenderness. Lateral malleolus tenderness found. No medial malleolus, no AITFL, no CF ligament, no posterior TFL, no head of 5th metatarsal and no proximal fibula tenderness found. Achilles tendon exhibits no pain.        Left ankle: Normal.        Legs:       Feet:    Neurological: She is alert and oriented to person, place, and time. She displays normal reflexes. No sensory deficit. She exhibits normal muscle tone.   Skin: Skin is warm and dry. Capillary refill takes less than 2 seconds. No rash noted. She is not diaphoretic.   Psychiatric: She has a normal mood and affect. Her behavior is normal. Judgment and thought content normal.         EXAMINATION:  XR ANKLE COMPLETE 3 VIEW RIGHT    CLINICAL HISTORY:  Unspecified fall, initial encounter    TECHNIQUE:  AP, lateral, and oblique images of the right ankle were  performed.    COMPARISON:  None    FINDINGS:  There is a nondisplaced fracture of the tip of the lateral malleolus with adjacent soft tissue swelling.  The ankle mortise is intact and the talar dome is preserved.  Moderate size posterior and plantar calcaneal spurs are noted.   Impression       Nondisplaced fracture of the tip of the lateral malleolus.      Electronically signed by: Chely Augustine MD  Date: 04/28/2018  Time: 14:37       EXAMINATION:  XR KNEE 3 VIEW LEFT    CLINICAL HISTORY:  Unspecified fall, initial encounter    TECHNIQUE:  AP, lateral, and Merchant views of the left knee were performed.    COMPARISON:  None    FINDINGS:  Overall alignment is within normal limits.  No displaced fracture, dislocation or destructive osseous process.  Minimal tricompartmental degenerative change.  No large suprapatellar joint effusion.  No subcutaneous emphysema or radiodense retained foreign body.   Impression       No acute displaced fracture-dislocation identified.      Electronically signed by: Baljinder Soto MD  Date: 04/28/2018  Time: 14:38         Assessment:       1. Closed nondisplaced fracture of lateral malleolus of right fibula, initial encounter    2. Fall, initial encounter    3. Contusion of left knee, initial encounter        Plan:         Closed nondisplaced fracture of lateral malleolus of right fibula, initial encounter  -     X-Ray Ankle Complete Right; Future; Expected date: 04/28/2018  -     CRUTCHES FOR HOME USE  -     AIR CAST WALKER BOOT FOR HOME USE    Fall, initial encounter  -     X-Ray Ankle Complete Right; Future; Expected date: 04/28/2018  -     X-Ray Knee 3 View Left; Future; Expected date: 04/28/2018    Contusion of left knee, initial encounter  -     X-Ray Knee 3 View Left; Future; Expected date: 04/28/2018      ACE wrap applied for additional support. Patient placed in walking boot in clinic. Small nondisplaced fracture and she is about to get on a cruise ship. She would prefer the  "walking boot instead of the splint. Discussed no weight bearing.   Crutch training provided.she is resistent to using crutches.   Assisted  with getting wheelchair for tomorrow delivered to the [employer] ship tomorrow. She will use crutches today.    She deferred pain medications. She reports she has "plenty" of Naprosyn 500 mg and she would prefer to just take that. She deferred Ultram, Norco, or Percocet. She reports severe itching.     Patient given a copy of her Xray results and a CD to follow up with Ortho when she gets home.       Patient Instructions     Rest, Ice, Elevate.   Wear walking boot at all times. Treat like a cast.   Do not get it wet. Only take it off to get in the tub.   No weight bearing. Use crutches or wheelchair.   Take Naprosyn with meals.   Follow up with ortho when you get home.     Please return here or go to the Emergency Department for any concerns or worsening of condition.  If you were prescribed antibiotics, please take them to completion.  If you were prescribed a narcotic medication, do not drive or operate heavy equipment or machinery while taking these medications.  Please follow up with your primary care doctor or specialist as needed.    If you  smoke, please stop smoking.      Treating Ankle Fractures  Treatment of an ankle fracture may be surgical or non-surgical, depending on where and how badly your ankle has been broken.   Some stable ankle fractures may be treated in a walking boot. These fractures are stable and will heal without additional treatment. You may be able to start walking on your ankle as soon as the pain improves.  Some fractures may require cast treatment.  A cast may be used to hold the broken bone in its proper position for healing. Sometimes the sections of broken bone must first be realigned. This is done by a process known as reduction. The type of reduction is based on how far the bone has moved from its normal position.     Sites of common ankle " fractures    Closed reduction  If you have a clean break with little soft tissue damage, closed reduction will probably be used. Before the procedure, you may be given a light anesthetic to relax your muscles. Then your doctor manually readjusts the position of the broken bone.  Open reduction  If you have an open fracture (bone sticking out through the skin), badly misaligned sections of bone, or severe tissue injury, open reduction is likely. A general anesthetic may be used during the procedure to let you sleep and relax your muscles. Your doctor then makes one or more incisions to realign the bone and repair soft tissue. Screws or plates may be used to hold the bone in place during healing.    Casting the fracture  To make sure the bone is aligned properly, an X-ray is taken. Then the ankle is put in a cast to hold the bone in place during healing. Youll probably have to wear the cast for several weeks. For less severe fractures, a walking boot, brace, or splint may be all thats needed to hold the bone in place during healing.  The road to healing  Once your fracture has been treated, your doctor will tell you how to help it heal. You may be told to limit ankle use or weight-bearing activities, take medicines, and elevate the foot. If you have a cast, remember to keep it dry.   Date Last Reviewed: 9/9/2015  © 4203-6432 [employer]. [address] [place] [address]. All rights reserved. This information is not intended as a substitute for professional medical care. Always follow your healthcare professional's instructions.        Ankle Fracture    You have an ankle fracture. This means that one or more of the bones that make up the ankle joint are broken. This causes pain, swelling, and sometimes bruising.  A fracture is treated with a splint or cast or special boot. It will take about 4 to 6 weeks for the fracture to heal. Surgery may be needed to fix severe injuries.  Home care  · You  will be given a splint, cast or boot to prevent movement at the ankle joint. Unless you were told otherwise, use crutches or a walker. Dont weight on the injured leg until cleared by your healthcare provider to do so. Crutches and walkers can be rented at many pharmacies and surgical or orthopedic supply stores. Dont put weight on a splint. It will break.  · Keep your leg elevated to reduce pain and swelling. When sleeping, place a pillow under the injured leg. When sitting, support the injured leg so it is level with your waist. This is very important during the first 48 hours.  · Apply an ice pack over the injured area for no more than 15 to 20 minutes. Do this every 3 to 6 hours for the first 24 to 48 hours. Continue with ice packs 3 to 4 times a day for the next 2 days, then as needed to ease pain and swelling. To make an ice pack, put ice cubes in a plastic bag that seals at the top. Wrap the bag in a clean, thin towel or cloth. Never put ice or an ice pack directly on the skin. You can place the ice pack directly over the cast or splint. As the ice melts, be careful that the cast or splint doesnt get wet.  · Keep the cast, splint, or boot completely dry at all times. Bathe with your cast, splint, or boot out of the water, protected with 2 large plastic bags. Place 1 bag outside of the other. Tape each bag with duct tape at the top end. Water can still leak in. So it's best to keep the cast, splint, or boot away from water. If a boot or fiberglass cast or splint gets wet, dry it with a hair dryer on a cool setting.  · You may use over-the-counter pain medicine to control pain, unless another pain medicine was prescribed. Talk with your provider beforeusing these medicines if you have chronic liver or kidney disease or ever had a stomach ulcer or GI bleeding.  Follow-up care  Follow up with your healthcare provider in 1 week, or as advised. This is to be sure the bone is healing properly. If you were given a  splint, it may be changed to a cast at your follow-up visit.  If X-rays were taken, you will be told of any new findings that may affect your care.  When to seek medical advice  Call your healthcare provider right away if any of these occur:  · The plaster cast or splint becomes wet or soft  · The fiberglass cast or splint stays wet for more than 24 hours  · There is increased tightness, sore areas, or pain under the cast or splint  · Your toes become swollen, cold, blue, numb, or tingly  · The cast becomes loose  · The cast has a bad smell  · The cast develops cracks or breaks   Date Last Reviewed: 11/23/2015  © 5634-8814 [employer]. [address] [place] [address], [place] [address]. All rights reserved. This information is not intended as a substitute for professional medical care. Always follow your healthcare professional's instructions.        Knee Pain  Knee pain is very common. Its especially common in active people who put a lot of pressure on their knees, like runners. It affects women more often than men.  Your kneecap (patella) is a thick, round bone. It covers and protects the front portion of your knee joint. It moves along a groove in your thighbone (femur) as part of the patellofemoral joint. A layer of cartilage surrounds the underside of your kneecap. This layer protects it from grinding against your femur.  When this cartilage softens and breaks down, it can cause knee pain. This is partly because of repetitive stress. The stress irritates the lining of the joint. This causes pain in the underlying bone.  What causes knee pain?  Many things can cause knee pain. You may have more than one cause. Some of these include:  · Overuse of the knee joint  · The kneecap doesnt line up with the tissue around it  · Damage to small nerves in the area  · Damage to the ligament-like structure that holds the kneecap in place (retinaculum)  · Breakdown of the bone under the cartilage  · Swelling in the  soft tissues around the kneecap  · Injury  You might be more likely to have knee pain if you:  · Exercise a lot  · Recently increased the intensity of your workouts  · Have a body mass index (BMI) greater than 25  · Have poor alignment of your kneecap  · Walk with your feet turned overly outward or inward  · Have weakness in surrounding muscle groups (inner quad or hip adductor muscles)  · Have too much tightness in surrounding muscle groups (hamstrings or iliotibial band)  · Have a recent history of injury to the area  · Are female  Symptoms of knee pain  This type of knee pain is a dull, aching pain in the front of the knee in the area under and around the kneecap. This pain may start quickly or slowly. Your pain might be worse when you squat, run, or sit for a long time. You might also sometimes feel like your knee is giving out. You may have symptoms in one or both of your knees.  Diagnosing knee pain  Your healthcare provider will ask about your medical history and your symptoms. Be sure to describe any activities that make your knee pain worse. He or she will look at your knee. This will include tests of your range of motion, strength, and areas of pain of your knee. Your knee alignment will be checked.  Your healthcare provider will need to rule out other causes of your knee pain, such as arthritis. You may need an imaging test, such as an X-ray or MRI.  Treatment for knee pain  Treatments that can help ease your symptoms may include:  · Avoiding activities for a while that make your pain worse, returning to activity over time  · Icing the outside of your knee when it causes you pain  · Taking over-the-counter pain medicine  · Wearing a knee brace or taping your knee to support it  · Wearing special shoe inserts to help keep your feet in the proper alignment  · Doing special exercises to stretch and strengthen the muscles around your hip and your knee  These steps help most people manage knee pain. But some  cases of knee pain need to be treated with surgery. You may need surgery right away. Or you may need it later if other treatments dont work. Your healthcare provider may refer you to an orthopedic surgeon. He or she will talk with you about your choices.  Preventing knee pain  Losing weight and correcting excess muscle tightness or muscle weakness may help lower your risk.  In some cases, you can prevent knee pain. To help prevent a flare-up of knee pain, you do these things:  · Regularly do all the exercises your doctor or physical therapist advises  · Support your knee as advised by your doctor or physical therapist  · Increase training gradually, and ease up on training when needed  · Have an expert check your gait for running or other sporting activities  · Stretch properly before and after exercise  · Replace your running shoes regularly  · Lose excess weight     When to call your healthcare provider  Call your healthcare provider right away if:  · Your symptoms dont get better after a few weeks of treatment  · You have any new symptoms   Date Last Reviewed: 4/1/2017  © 4751-6337 [employer]. [address] [place] [address], [place] [address]. All rights reserved. This information is not intended as a substitute for professional medical care. Always follow your healthcare professional's instructions.

## 2018-04-28 NOTE — PATIENT INSTRUCTIONS
Rest, Ice, Elevate.   Wear walking boot at all times. Treat like a cast.   Do not get it wet. Only take it off to get in the tub.   No weight bearing. Use crutches or wheelchair.   Take Naprosyn with meals.   Follow up with ortho when you get home.     Please return here or go to the Emergency Department for any concerns or worsening of condition.  If you were prescribed antibiotics, please take them to completion.  If you were prescribed a narcotic medication, do not drive or operate heavy equipment or machinery while taking these medications.  Please follow up with your primary care doctor or specialist as needed.    If you  smoke, please stop smoking.      Treating Ankle Fractures  Treatment of an ankle fracture may be surgical or non-surgical, depending on where and how badly your ankle has been broken.   Some stable ankle fractures may be treated in a walking boot. These fractures are stable and will heal without additional treatment. You may be able to start walking on your ankle as soon as the pain improves.  Some fractures may require cast treatment.  A cast may be used to hold the broken bone in its proper position for healing. Sometimes the sections of broken bone must first be realigned. This is done by a process known as reduction. The type of reduction is based on how far the bone has moved from its normal position.     Sites of common ankle fractures    Closed reduction  If you have a clean break with little soft tissue damage, closed reduction will probably be used. Before the procedure, you may be given a light anesthetic to relax your muscles. Then your doctor manually readjusts the position of the broken bone.  Open reduction  If you have an open fracture (bone sticking out through the skin), badly misaligned sections of bone, or severe tissue injury, open reduction is likely. A general anesthetic may be used during the procedure to let you sleep and relax your muscles. Your doctor then makes one  or more incisions to realign the bone and repair soft tissue. Screws or plates may be used to hold the bone in place during healing.    Casting the fracture  To make sure the bone is aligned properly, an X-ray is taken. Then the ankle is put in a cast to hold the bone in place during healing. Youll probably have to wear the cast for several weeks. For less severe fractures, a walking boot, brace, or splint may be all thats needed to hold the bone in place during healing.  The road to healing  Once your fracture has been treated, your doctor will tell you how to help it heal. You may be told to limit ankle use or weight-bearing activities, take medicines, and elevate the foot. If you have a cast, remember to keep it dry.   Date Last Reviewed: 9/9/2015  © 4058-0487 [employer]. [address] [place] [address]. All rights reserved. This information is not intended as a substitute for professional medical care. Always follow your healthcare professional's instructions.        Ankle Fracture    You have an ankle fracture. This means that one or more of the bones that make up the ankle joint are broken. This causes pain, swelling, and sometimes bruising.  A fracture is treated with a splint or cast or special boot. It will take about 4 to 6 weeks for the fracture to heal. Surgery may be needed to fix severe injuries.  Home care  · You will be given a splint, cast or boot to prevent movement at the ankle joint. Unless you were told otherwise, use crutches or a walker. Dont weight on the injured leg until cleared by your healthcare provider to do so. Crutches and walkers can be rented at many pharmacies and surgical or orthopedic supply stores. Dont put weight on a splint. It will break.  · Keep your leg elevated to reduce pain and swelling. When sleeping, place a pillow under the injured leg. When sitting, support the injured leg so it is level with your waist. This is very important during the  first 48 hours.  · Apply an ice pack over the injured area for no more than 15 to 20 minutes. Do this every 3 to 6 hours for the first 24 to 48 hours. Continue with ice packs 3 to 4 times a day for the next 2 days, then as needed to ease pain and swelling. To make an ice pack, put ice cubes in a plastic bag that seals at the top. Wrap the bag in a clean, thin towel or cloth. Never put ice or an ice pack directly on the skin. You can place the ice pack directly over the cast or splint. As the ice melts, be careful that the cast or splint doesnt get wet.  · Keep the cast, splint, or boot completely dry at all times. Bathe with your cast, splint, or boot out of the water, protected with 2 large plastic bags. Place 1 bag outside of the other. Tape each bag with duct tape at the top end. Water can still leak in. So it's best to keep the cast, splint, or boot away from water. If a boot or fiberglass cast or splint gets wet, dry it with a hair dryer on a cool setting.  · You may use over-the-counter pain medicine to control pain, unless another pain medicine was prescribed. Talk with your provider beforeusing these medicines if you have chronic liver or kidney disease or ever had a stomach ulcer or GI bleeding.  Follow-up care  Follow up with your healthcare provider in 1 week, or as advised. This is to be sure the bone is healing properly. If you were given a splint, it may be changed to a cast at your follow-up visit.  If X-rays were taken, you will be told of any new findings that may affect your care.  When to seek medical advice  Call your healthcare provider right away if any of these occur:  · The plaster cast or splint becomes wet or soft  · The fiberglass cast or splint stays wet for more than 24 hours  · There is increased tightness, sore areas, or pain under the cast or splint  · Your toes become swollen, cold, blue, numb, or tingly  · The cast becomes loose  · The cast has a bad smell  · The cast  develops cracks or breaks   Date Last Reviewed: 11/23/2015 © 2000-2017 [employer]. [address] [place] [address], [place] [address]. All rights reserved. This information is not intended as a substitute for professional medical care. Always follow your healthcare professional's instructions.        Knee Pain  Knee pain is very common. Its especially common in active people who put a lot of pressure on their knees, like runners. It affects women more often than men.  Your kneecap (patella) is a thick, round bone. It covers and protects the front portion of your knee joint. It moves along a groove in your thighbone (femur) as part of the patellofemoral joint. A layer of cartilage surrounds the underside of your kneecap. This layer protects it from grinding against your femur.  When this cartilage softens and breaks down, it can cause knee pain. This is partly because of repetitive stress. The stress irritates the lining of the joint. This causes pain in the underlying bone.  What causes knee pain?  Many things can cause knee pain. You may have more than one cause. Some of these include:  · Overuse of the knee joint  · The kneecap doesnt line up with the tissue around it  · Damage to small nerves in the area  · Damage to the ligament-like structure that holds the kneecap in place (retinaculum)  · Breakdown of the bone under the cartilage  · Swelling in the soft tissues around the kneecap  · Injury  You might be more likely to have knee pain if you:  · Exercise a lot  · Recently increased the intensity of your workouts  · Have a body mass index (BMI) greater than 25  · Have poor alignment of your kneecap  · Walk with your feet turned overly outward or inward  · Have weakness in surrounding muscle groups (inner quad or hip adductor muscles)  · Have too much tightness in surrounding muscle groups (hamstrings or iliotibial band)  · Have a recent history of injury to the area  · Are female  Symptoms of knee  pain  This type of knee pain is a dull, aching pain in the front of the knee in the area under and around the kneecap. This pain may start quickly or slowly. Your pain might be worse when you squat, run, or sit for a long time. You might also sometimes feel like your knee is giving out. You may have symptoms in one or both of your knees.  Diagnosing knee pain  Your healthcare provider will ask about your medical history and your symptoms. Be sure to describe any activities that make your knee pain worse. He or she will look at your knee. This will include tests of your range of motion, strength, and areas of pain of your knee. Your knee alignment will be checked.  Your healthcare provider will need to rule out other causes of your knee pain, such as arthritis. You may need an imaging test, such as an X-ray or MRI.  Treatment for knee pain  Treatments that can help ease your symptoms may include:  · Avoiding activities for a while that make your pain worse, returning to activity over time  · Icing the outside of your knee when it causes you pain  · Taking over-the-counter pain medicine  · Wearing a knee brace or taping your knee to support it  · Wearing special shoe inserts to help keep your feet in the proper alignment  · Doing special exercises to stretch and strengthen the muscles around your hip and your knee  These steps help most people manage knee pain. But some cases of knee pain need to be treated with surgery. You may need surgery right away. Or you may need it later if other treatments dont work. Your healthcare provider may refer you to an orthopedic surgeon. He or she will talk with you about your choices.  Preventing knee pain  Losing weight and correcting excess muscle tightness or muscle weakness may help lower your risk.  In some cases, you can prevent knee pain. To help prevent a flare-up of knee pain, you do these things:  · Regularly do all the exercises your doctor or physical therapist  advises  · Support your knee as advised by your doctor or physical therapist  · Increase training gradually, and ease up on training when needed  · Have an expert check your gait for running or other sporting activities  · Stretch properly before and after exercise  · Replace your running shoes regularly  · Lose excess weight     When to call your healthcare provider  Call your healthcare provider right away if:  · Your symptoms dont get better after a few weeks of treatment  · You have any new symptoms   Date Last Reviewed: 4/1/2017  © 6328-0033 [employer]. [address] [place] [address], [place] [address]. All rights reserved. This information is not intended as a substitute for professional medical care. Always follow your healthcare professional's instructions.

## 2018-05-24 DIAGNOSIS — Z12.39 BREAST CANCER SCREENING: ICD-10-CM

## 2018-06-05 ENCOUNTER — OFFICE VISIT (OUTPATIENT)
Dept: FAMILY MEDICINE CLINIC | Age: 53
End: 2018-06-05
Payer: COMMERCIAL

## 2018-06-05 VITALS
HEIGHT: 61 IN | DIASTOLIC BLOOD PRESSURE: 82 MMHG | SYSTOLIC BLOOD PRESSURE: 130 MMHG | WEIGHT: 271 LBS | RESPIRATION RATE: 20 BRPM | BODY MASS INDEX: 51.16 KG/M2 | TEMPERATURE: 98.4 F | HEART RATE: 68 BPM

## 2018-06-05 DIAGNOSIS — B02.9 HERPES ZOSTER WITHOUT COMPLICATION: Primary | ICD-10-CM

## 2018-06-05 DIAGNOSIS — L25.8 CONTACT DERMATITIS DUE TO OTHER AGENT, UNSPECIFIED CONTACT DERMATITIS TYPE: ICD-10-CM

## 2018-06-05 PROCEDURE — G8427 DOCREV CUR MEDS BY ELIG CLIN: HCPCS | Performed by: NURSE PRACTITIONER

## 2018-06-05 PROCEDURE — 1036F TOBACCO NON-USER: CPT | Performed by: NURSE PRACTITIONER

## 2018-06-05 PROCEDURE — 3017F COLORECTAL CA SCREEN DOC REV: CPT | Performed by: NURSE PRACTITIONER

## 2018-06-05 PROCEDURE — G8417 CALC BMI ABV UP PARAM F/U: HCPCS | Performed by: NURSE PRACTITIONER

## 2018-06-05 PROCEDURE — 99213 OFFICE O/P EST LOW 20 MIN: CPT | Performed by: NURSE PRACTITIONER

## 2018-06-05 RX ORDER — VALACYCLOVIR HYDROCHLORIDE 1 G/1
1000 TABLET, FILM COATED ORAL EVERY 8 HOURS
Qty: 21 TABLET | Refills: 0 | Status: SHIPPED | OUTPATIENT
Start: 2018-06-05 | End: 2018-06-12

## 2018-06-05 ASSESSMENT — ENCOUNTER SYMPTOMS
VOMITING: 0
COUGH: 0
SHORTNESS OF BREATH: 0

## 2018-06-06 ENCOUNTER — HOSPITAL ENCOUNTER (OUTPATIENT)
Age: 53
Setting detail: SPECIMEN
Discharge: HOME | End: 2018-06-06
Payer: COMMERCIAL

## 2018-06-06 LAB — VITAMIN D 25-HYDROXY: 18.8 NG/ML (ref 30–100)

## 2018-06-09 DIAGNOSIS — B02.9 HERPES ZOSTER WITHOUT COMPLICATION: ICD-10-CM

## 2018-06-11 RX ORDER — VALACYCLOVIR HYDROCHLORIDE 1 G/1
1000 TABLET, FILM COATED ORAL EVERY 8 HOURS
Qty: 21 TABLET | Refills: 0 | OUTPATIENT
Start: 2018-06-11 | End: 2018-06-18

## 2018-08-18 DIAGNOSIS — E78.2 MIXED HYPERLIPIDEMIA: ICD-10-CM

## 2018-08-20 RX ORDER — ATORVASTATIN CALCIUM 20 MG/1
20 TABLET, FILM COATED ORAL DAILY
Qty: 30 TABLET | Refills: 5 | Status: SHIPPED | OUTPATIENT
Start: 2018-08-20 | End: 2019-02-18 | Stop reason: SDUPTHER

## 2018-09-21 LAB
CHLORIDE BLD-SCNC: 101 MMOL/L (ref 97–107)
CO2: 31 MMOL/L (ref 21–32)
HCT VFR BLD CALC: 41.1 % (ref 34.6–44.1)
HEMOGLOBIN: 13 G/DL (ref 11.7–14.9)
MCH RBC QN AUTO: 19.7 PG (ref 27.8–33.2)
MCHC RBC AUTO-ENTMCNC: 31.7 G/DL (ref 32.7–34.8)
MCV RBC AUTO: 62.1 FL (ref 83–97.4)
PDW BLD-RTO: 16.6 % (ref 12.2–15.8)
PLATELET # BLD: 306 10'3/UL (ref 122–359)
PMV BLD AUTO: 8.8 FL (ref 7.6–10.6)
POTASSIUM SERPL-SCNC: 3.9 MMOL/L (ref 3.5–5.1)
RBC # BLD: 6.61 10'6/UL (ref 3.85–4.88)
SODIUM BLD-SCNC: 140 MMOL/L (ref 136–145)
WBC: 7.1 10'3/UL (ref 3.2–9.3)

## 2018-09-25 DIAGNOSIS — E78.00 PURE HYPERCHOLESTEROLEMIA: Primary | ICD-10-CM

## 2018-09-26 ENCOUNTER — HOSPITAL ENCOUNTER (OUTPATIENT)
Age: 53
Setting detail: SPECIMEN
Discharge: HOME | End: 2018-09-26
Payer: COMMERCIAL

## 2018-09-26 DIAGNOSIS — E78.00 PURE HYPERCHOLESTEROLEMIA: ICD-10-CM

## 2018-09-26 LAB
ALBUMIN SERPL-MCNC: 4.1 G/DL (ref 3.5–5.2)
ALBUMIN/GLOBULIN RATIO: 1.7 (ref 1–2.5)
ALP BLD-CCNC: 82 U/L (ref 35–104)
ALT SERPL-CCNC: 20 U/L (ref 5–33)
ANION GAP SERPL CALCULATED.3IONS-SCNC: 13 MMOL/L (ref 9–17)
AST SERPL-CCNC: 16 U/L
BILIRUB SERPL-MCNC: 0.46 MG/DL (ref 0.3–1.2)
BUN BLDV-MCNC: 10 MG/DL (ref 6–20)
BUN/CREAT BLD: NORMAL (ref 9–20)
CALCIUM SERPL-MCNC: 9 MG/DL (ref 8.6–10.4)
CHLORIDE BLD-SCNC: 103 MMOL/L (ref 98–107)
CHOLESTEROL/HDL RATIO: 2.7
CHOLESTEROL: 146 MG/DL
CO2: 26 MMOL/L (ref 20–31)
CREAT SERPL-MCNC: 0.51 MG/DL (ref 0.5–0.9)
GFR AFRICAN AMERICAN: >60 ML/MIN
GFR NON-AFRICAN AMERICAN: >60 ML/MIN
GFR SERPL CREATININE-BSD FRML MDRD: NORMAL ML/MIN/{1.73_M2}
GFR SERPL CREATININE-BSD FRML MDRD: NORMAL ML/MIN/{1.73_M2}
GLUCOSE BLD-MCNC: 91 MG/DL (ref 70–99)
HDLC SERPL-MCNC: 55 MG/DL
LDL CHOLESTEROL: 71 MG/DL (ref 0–130)
POTASSIUM SERPL-SCNC: 4.5 MMOL/L (ref 3.7–5.3)
SODIUM BLD-SCNC: 142 MMOL/L (ref 135–144)
TOTAL PROTEIN: 6.5 G/DL (ref 6.4–8.3)
TRIGL SERPL-MCNC: 99 MG/DL
VLDLC SERPL CALC-MCNC: NORMAL MG/DL (ref 1–30)

## 2018-11-20 PROBLEM — E66.01 OBESITY, MORBID, BMI 50 OR HIGHER (HCC): Status: ACTIVE | Noted: 2018-11-20

## 2018-11-30 ENCOUNTER — OFFICE VISIT (OUTPATIENT)
Dept: FAMILY MEDICINE CLINIC | Age: 53
End: 2018-11-30
Payer: COMMERCIAL

## 2018-11-30 VITALS
RESPIRATION RATE: 18 BRPM | BODY MASS INDEX: 47.73 KG/M2 | HEART RATE: 80 BPM | DIASTOLIC BLOOD PRESSURE: 76 MMHG | WEIGHT: 252.6 LBS | SYSTOLIC BLOOD PRESSURE: 118 MMHG | TEMPERATURE: 98.2 F

## 2018-11-30 DIAGNOSIS — F33.0 MILD EPISODE OF RECURRENT MAJOR DEPRESSIVE DISORDER (HCC): ICD-10-CM

## 2018-11-30 DIAGNOSIS — N63.20 MASS OF LEFT BREAST: ICD-10-CM

## 2018-11-30 DIAGNOSIS — E78.00 PURE HYPERCHOLESTEROLEMIA: Primary | ICD-10-CM

## 2018-11-30 DIAGNOSIS — E55.9 VITAMIN D DEFICIENCY: ICD-10-CM

## 2018-11-30 DIAGNOSIS — E66.01 MORBID OBESITY (HCC): ICD-10-CM

## 2018-11-30 PROCEDURE — G8427 DOCREV CUR MEDS BY ELIG CLIN: HCPCS | Performed by: NURSE PRACTITIONER

## 2018-11-30 PROCEDURE — G8417 CALC BMI ABV UP PARAM F/U: HCPCS | Performed by: NURSE PRACTITIONER

## 2018-11-30 PROCEDURE — 1036F TOBACCO NON-USER: CPT | Performed by: NURSE PRACTITIONER

## 2018-11-30 PROCEDURE — G8484 FLU IMMUNIZE NO ADMIN: HCPCS | Performed by: NURSE PRACTITIONER

## 2018-11-30 PROCEDURE — 3017F COLORECTAL CA SCREEN DOC REV: CPT | Performed by: NURSE PRACTITIONER

## 2018-11-30 PROCEDURE — 99214 OFFICE O/P EST MOD 30 MIN: CPT | Performed by: NURSE PRACTITIONER

## 2018-11-30 RX ORDER — CITALOPRAM 10 MG/1
10 TABLET ORAL NIGHTLY
Qty: 30 TABLET | Refills: 5 | Status: SHIPPED | OUTPATIENT
Start: 2018-11-30 | End: 2019-06-20 | Stop reason: SDUPTHER

## 2018-11-30 ASSESSMENT — ENCOUNTER SYMPTOMS
ABDOMINAL PAIN: 0
VOMITING: 0
TROUBLE SWALLOWING: 0
BACK PAIN: 0
DIARRHEA: 0
EYE PAIN: 0
SHORTNESS OF BREATH: 0
COUGH: 0
WHEEZING: 0
CONSTIPATION: 0
EYE DISCHARGE: 0
RHINORRHEA: 0
SORE THROAT: 0
NAUSEA: 0

## 2018-11-30 NOTE — PROGRESS NOTES
Patient ID: Jo-Ann Mueller is a 48 y.o. female. Patient presents in office today for routine follow-up on chronic conditions including hyperlipidemia, depression and vitamin D deficiency. She is taking her medications daily as prescribed. Had routine labs in September 2018, which were within normal limits. She had surgery on her right ankle per Dr. Zev Loaiza in October. Post to be wearing a walking boot but tells me she cannot drive in it so she is not wearing it today. Overall doing well. She would like a lump in her left breast check today. Noticed a few weeks ago. Does not feel like lump has enlarged, but is painful to touch. Hyperlipidemia   This is a chronic problem. The current episode started more than 1 year ago. Pertinent negatives include no chest pain or shortness of breath. Review of Systems   Constitutional: Negative for activity change, appetite change and fever. HENT: Negative for congestion, ear pain, rhinorrhea, sore throat and trouble swallowing. Eyes: Negative for pain, discharge and visual disturbance. Respiratory: Negative for cough, shortness of breath and wheezing. Cardiovascular: Negative for chest pain. Gastrointestinal: Negative for abdominal pain, constipation, diarrhea, nausea and vomiting. Genitourinary: Negative for dysuria. Lump left breast   Musculoskeletal: Positive for arthralgias (right ankle). Negative for back pain, gait problem and joint swelling. Had surgery on right ankle in October per Dr. Zev Loaiza. Walking better. Supposed to wear boot but cannot drive with it on. Skin: Negative for rash. Neurological: Negative for dizziness, light-headedness and headaches. Psychiatric/Behavioral: Positive for dysphoric mood (stable on celexa daily). Negative for sleep disturbance.        PHQ Scores 1/20/2018 1/17/2017   PHQ2 Score 0 0   PHQ9 Score 0 0     Interpretation of Total Score Depression Severity: 1-4 = Minimal citalopram (CELEXA) 10 MG tablet   3. Morbid obesity ([address]     4. Vitamin D deficiency     5. Mass of left breast  US BREAST COMPLETE LEFT    ANTONIO DIGITAL DIAGNOSTIC W OR WO CAD LEFT       Plan:     Continue medications daily as prescribed  Reviewed and discussed September lab results  Recommend healthy diet and exercise  Continue to work on weight loss  Obtain ultrasound and diagnostic mammogram of left breast to evaluate mass  Recommend obtain Shingrix from pharmacy  Monitor for worsening symptoms  Call office with concerns      Eli Manzano received counseling on the following healthy behaviors: nutrition, exercise and medication adherence  Reviewed prior labs and health maintenance  Continue current medications, diet and exercise. Discussed use, benefit, and side effects of prescribed medications. Barriers to medication compliance addressed. Patient given educational materials - see patient instructions  Was a self-tracking handout given in paper form or via [employer]? No    Requested Prescriptions     Signed Prescriptions Disp Refills    citalopram (CELEXA) 10 MG tablet 30 tablet 5     Sig: Take 1 tablet by mouth nightly       All patient questions answered. Patient voiced understanding. Quality Measures    Body mass index is 47.73 kg/m². Elevated. Weight control planned discussed Healthy diet and regular exercise. BP: 118/76 Blood pressure is normal. Treatment plan consists of No treatment change needed.     Lab Results   Component Value Date    LDLCALC 111 08/06/2014    LDLCHOLESTEROL 71 09/26/2018    (goal LDL reduction with dx if diabetes is 50% LDL reduction)      PHQ Scores 1/20/2018 1/17/2017   PHQ2 Score 0 0   PHQ9 Score 0 0     Interpretation of Total Score Depression Severity: 1-4 = Minimal depression, 5-9 = Mild depression, 10-14 = Moderate depression, 15-19 = Moderately severe depression, 20-27 = Severe depression          Electronically signed by ANA LAURA Veloz CNP on 11/30/2018 at

## 2018-12-06 DIAGNOSIS — N63.20 MASS OF LEFT BREAST: ICD-10-CM

## 2019-02-18 DIAGNOSIS — E78.2 MIXED HYPERLIPIDEMIA: ICD-10-CM

## 2019-02-19 RX ORDER — ATORVASTATIN CALCIUM 20 MG/1
20 TABLET, FILM COATED ORAL DAILY
Qty: 30 TABLET | Refills: 5 | Status: SHIPPED | OUTPATIENT
Start: 2019-02-19 | End: 2019-08-20 | Stop reason: SDUPTHER

## 2019-04-15 ENCOUNTER — HOSPITAL ENCOUNTER (OUTPATIENT)
Age: 54
Setting detail: SPECIMEN
Discharge: HOME | End: 2019-04-15
Payer: COMMERCIAL

## 2019-04-15 ENCOUNTER — OFFICE VISIT (OUTPATIENT)
Dept: FAMILY MEDICINE CLINIC | Age: 54
End: 2019-04-15
Payer: COMMERCIAL

## 2019-04-15 VITALS
RESPIRATION RATE: 14 BRPM | BODY MASS INDEX: 51.21 KG/M2 | TEMPERATURE: 98.1 F | WEIGHT: 271 LBS | HEART RATE: 76 BPM | SYSTOLIC BLOOD PRESSURE: 118 MMHG | DIASTOLIC BLOOD PRESSURE: 86 MMHG

## 2019-04-15 DIAGNOSIS — G44.209 ACUTE NON INTRACTABLE TENSION-TYPE HEADACHE: ICD-10-CM

## 2019-04-15 DIAGNOSIS — R50.9 LOW GRADE FEVER: ICD-10-CM

## 2019-04-15 DIAGNOSIS — R52 BODY ACHES: Primary | ICD-10-CM

## 2019-04-15 DIAGNOSIS — R52 BODY ACHES: ICD-10-CM

## 2019-04-15 LAB
-: ABNORMAL
AMORPHOUS: ABNORMAL
BACTERIA: ABNORMAL
BILIRUBIN URINE: NEGATIVE
CASTS UA: ABNORMAL /LPF (ref 0–8)
COLOR: YELLOW
COMMENT UA: ABNORMAL
CRYSTALS, UA: ABNORMAL /HPF
EPITHELIAL CELLS UA: ABNORMAL /HPF (ref 0–5)
GLUCOSE URINE: NEGATIVE
HCT VFR BLD CALC: 38.7 % (ref 36.3–47.1)
HEMOGLOBIN: 11.7 G/DL (ref 11.9–15.1)
KETONES, URINE: NEGATIVE
LEUKOCYTE ESTERASE, URINE: NEGATIVE
MCH RBC QN AUTO: 20.2 PG (ref 25.2–33.5)
MCHC RBC AUTO-ENTMCNC: 30.2 G/DL (ref 28.4–34.8)
MCV RBC AUTO: 67 FL (ref 82.6–102.9)
MUCUS: ABNORMAL
NITRITE, URINE: NEGATIVE
NRBC AUTOMATED: 0 PER 100 WBC
OTHER OBSERVATIONS UA: ABNORMAL
PDW BLD-RTO: 17.7 % (ref 11.8–14.4)
PH UA: 6.5 (ref 5–8)
PLATELET # BLD: 295 K/UL (ref 138–453)
PMV BLD AUTO: 10.8 FL (ref 8.1–13.5)
PROTEIN UA: NEGATIVE
RBC # BLD: 5.78 M/UL (ref 3.95–5.11)
RBC UA: ABNORMAL /HPF (ref 0–4)
RENAL EPITHELIAL, UA: ABNORMAL /HPF
SPECIFIC GRAVITY UA: 1.02 (ref 1–1.03)
TRICHOMONAS: ABNORMAL
TURBIDITY: ABNORMAL
URINE HGB: NEGATIVE
UROBILINOGEN, URINE: NORMAL
WBC # BLD: 6.1 K/UL (ref 3.5–11.3)
WBC UA: ABNORMAL /HPF (ref 0–5)
YEAST: ABNORMAL

## 2019-04-15 PROCEDURE — 1036F TOBACCO NON-USER: CPT | Performed by: NURSE PRACTITIONER

## 2019-04-15 PROCEDURE — 99213 OFFICE O/P EST LOW 20 MIN: CPT | Performed by: NURSE PRACTITIONER

## 2019-04-15 PROCEDURE — G8427 DOCREV CUR MEDS BY ELIG CLIN: HCPCS | Performed by: NURSE PRACTITIONER

## 2019-04-15 PROCEDURE — 3017F COLORECTAL CA SCREEN DOC REV: CPT | Performed by: NURSE PRACTITIONER

## 2019-04-15 PROCEDURE — G8417 CALC BMI ABV UP PARAM F/U: HCPCS | Performed by: NURSE PRACTITIONER

## 2019-04-15 ASSESSMENT — ENCOUNTER SYMPTOMS
ABDOMINAL PAIN: 0
SWOLLEN GLANDS: 0
SCALP TENDERNESS: 1
BLURRED VISION: 0
DIARRHEA: 1
RHINORRHEA: 0
SINUS PRESSURE: 0
COUGH: 0
SORE THROAT: 0
VISUAL CHANGE: 0
WHEEZING: 0
VOMITING: 0
PHOTOPHOBIA: 0
NAUSEA: 0

## 2019-04-15 ASSESSMENT — PATIENT HEALTH QUESTIONNAIRE - PHQ9
SUM OF ALL RESPONSES TO PHQ QUESTIONS 1-9: 2
SUM OF ALL RESPONSES TO PHQ QUESTIONS 1-9: 2
SUM OF ALL RESPONSES TO PHQ9 QUESTIONS 1 & 2: 2
1. LITTLE INTEREST OR PLEASURE IN DOING THINGS: 1
2. FEELING DOWN, DEPRESSED OR HOPELESS: 1

## 2019-04-15 NOTE — PROGRESS NOTES
Subjective:      Patient ID: En Sequeira is a 48 y.o. female. Visit Information    Have you changed or started any medications since your last visit including any over-the-counter medicines, vitamins, or herbal medicines? no   Are you having any side effects from any of your medications? -  no  Have you stopped taking any of your medications? Is so, why? -  no    Have you seen any other physician or provider since your last visit? No  Have you had any other diagnostic tests since your last visit? No  Have you been seen in the emergency room and/or had an admission to a hospital since we last saw you? No  Have you had your routine dental cleaning in the past 6 months? yes - routine    Have you activated your [employer] account? If not, what are your barriers?  Yes     Patient Care Team:  ANA LAURA Amato CNP as PCP - General (Certified Nurse Practitioner)    Medical History Review  Past Medical, Family, and Social History reviewed and does contribute to the patient presenting condition    Health Maintenance   Topic Date Due    Shingles Vaccine (1 of 2) 09/15/2015    HIV screen  04/24/2019 (Originally 9/15/1980)    Potassium monitoring  09/26/2019    Creatinine monitoring  09/26/2019    Breast cancer screen  12/05/2020    Lipid screen  09/26/2023    Colon cancer screen colonoscopy  07/21/2027    DTaP/Tdap/Td vaccine (2 - Td) 11/03/2027    Flu vaccine  Completed    Hepatitis C screen  Completed    Pneumococcal 0-64 years Vaccine  Aged Out       [facility] Scores 4/15/2019 1/20/2018 1/17/2017   PHQ2 Score 2 0 0   PHQ9 Score 2 0 0     Interpretation of Total Score DepressionSeverity: 1-4 = Minimal depression, 5-9 = Mild depression, 10-14 = Moderate depression, 15-19 = Moderately severe depression, 20-27 = Severe depression    Current Outpatient Medications   Medication Sig Dispense Refill    atorvastatin (LIPITOR) 20 MG tablet Take 1 tablet by mouth daily 30 tablet 5    citalopram (CELEXA) 10 MG tablet symptoms include a fever, muscle aches, scalp tenderness and weakness. Pertinent negatives include no abdominal pain, abnormal behavior, blurred vision, coughing, dizziness, ear pain, loss of balance, nausea, phonophobia, photophobia, rhinorrhea, sinus pressure, sore throat, swollen glands, visual change or vomiting. Nothing aggravates the symptoms. She has tried acetaminophen for the symptoms. The treatment provided mild relief. Her past medical history is significant for migraines in the family and obesity. There is no history of migraine headaches or recent head traumas. Review of Systems   Constitutional: Positive for fatigue and fever. Negative for activity change and appetite change. HENT: Negative for congestion, ear pain, rhinorrhea, sinus pressure and sore throat. Eyes: Negative for blurred vision and photophobia. Respiratory: Negative for cough and wheezing. Cardiovascular: Negative for chest pain. Gastrointestinal: Positive for diarrhea. Negative for abdominal pain, nausea and vomiting. Genitourinary: Negative for decreased urine volume and dysuria. Musculoskeletal: Positive for myalgias. Skin: Negative for rash. Neurological: Positive for weakness, light-headedness and headaches. Negative for dizziness and loss of balance. Psychiatric/Behavioral: Positive for sleep disturbance (more than normal). Objective:     /86 (Site: Left Upper Arm, Position: Sitting, Cuff Size: Large Adult)   Pulse 76   Temp 98.1 °F (36.7 °C) (Tympanic)   Resp 14   Wt 271 lb (122.9 kg)   LMP 09/11/2002   BMI 51.21 kg/m²      Physical Exam   Constitutional: She is oriented to person, place, and time. She appears well-developed. She is cooperative. She appears ill. No distress. HENT:   Head: Normocephalic.    Right Ear: Tympanic membrane normal.   Left Ear: Tympanic membrane normal.   Nose: Nose normal.   Mouth/Throat: Uvula is midline, oropharynx is clear and moist and mucous membranes are normal.   Eyes: Conjunctivae and lids are normal.   Neck: Normal range of motion. Normal range of motion present. Cardiovascular: Normal rate, regular rhythm and intact distal pulses. Pulses:       Radial pulses are 2+ on the right side, and 2+ on the left side. Pulmonary/Chest: Effort normal and breath sounds normal. She has no decreased breath sounds. Abdominal: Normal appearance and bowel sounds are normal. There is no tenderness. Lymphadenopathy:     She has no cervical adenopathy. Neurological: She is alert and oriented to person, place, and time. Skin: Skin is warm. Capillary refill takes less than 2 seconds. No rash noted. Psychiatric: She has a normal mood and affect. Her behavior is normal.   Nursing note and vitals reviewed. Assessment:      Diagnosis Orders   1. Body aches  Comprehensive Metabolic Panel    TSH without Reflex    CBC    Magnesium    Urinalysis Reflex to Culture   2. Low grade fever  Comprehensive Metabolic Panel    TSH without Reflex    CBC    Magnesium    Urinalysis Reflex to Culture   3. Acute non intractable tension-type headache  Comprehensive Metabolic Panel    TSH without Reflex    CBC    Magnesium    Urinalysis Reflex to Culture       Plan:     Labs today as ordered  UA as ordered  Increase fluids to stay well hydrated  Eat at regular intervals  Educated symptoms likely viral  Recommend OTC Tylenol/Motrin for discomfort   Advise to use salt water gargles for sore throat  Advise to take hot water steam to help with congestion  Use saline rinse to nose as needed for congestion. Recommend use humidifier at night. May use hot tea with honey and lemon   Call if any concerns  Return if symptoms worsen or fail to improve. Robert Zapata received counseling on the following healthy behaviors: nutrition and medication adherence  Reviewed prior labs and health maintenance. Continue current medications, diet and exercise.   Discussed use, benefit, and side effects of prescribed medications. Barriers to medication compliance addressed. Patient given educational materials - see patient instructions. All patient questions answered. Patient voiced understanding.            Electronically signed by ANA LAURA Maria CNP on 4/15/2019 at 2:41 PM

## 2019-04-16 DIAGNOSIS — D50.9 IRON DEFICIENCY ANEMIA, UNSPECIFIED IRON DEFICIENCY ANEMIA TYPE: Primary | ICD-10-CM

## 2019-04-16 LAB
ALBUMIN SERPL-MCNC: 4.1 G/DL (ref 3.5–5.2)
ALBUMIN/GLOBULIN RATIO: 1.3 (ref 1–2.5)
ALP BLD-CCNC: 79 U/L (ref 35–104)
ALT SERPL-CCNC: 21 U/L (ref 5–33)
ANION GAP SERPL CALCULATED.3IONS-SCNC: 12 MMOL/L (ref 9–17)
AST SERPL-CCNC: 18 U/L
BILIRUB SERPL-MCNC: 0.4 MG/DL (ref 0.3–1.2)
BUN BLDV-MCNC: 16 MG/DL (ref 6–20)
BUN/CREAT BLD: ABNORMAL (ref 9–20)
CALCIUM SERPL-MCNC: 9.5 MG/DL (ref 8.6–10.4)
CHLORIDE BLD-SCNC: 101 MMOL/L (ref 98–107)
CO2: 27 MMOL/L (ref 20–31)
CREAT SERPL-MCNC: 0.43 MG/DL (ref 0.5–0.9)
FERRITIN: 128 UG/L (ref 13–150)
GFR AFRICAN AMERICAN: >60 ML/MIN
GFR NON-AFRICAN AMERICAN: >60 ML/MIN
GFR SERPL CREATININE-BSD FRML MDRD: ABNORMAL ML/MIN/{1.73_M2}
GFR SERPL CREATININE-BSD FRML MDRD: ABNORMAL ML/MIN/{1.73_M2}
GLUCOSE BLD-MCNC: 73 MG/DL (ref 70–99)
IRON SATURATION: 23 % (ref 20–55)
IRON: 79 UG/DL (ref 37–145)
MAGNESIUM: 2 MG/DL (ref 1.6–2.6)
POTASSIUM SERPL-SCNC: 4.4 MMOL/L (ref 3.7–5.3)
SODIUM BLD-SCNC: 140 MMOL/L (ref 135–144)
TOTAL IRON BINDING CAPACITY: 340 UG/DL (ref 250–450)
TOTAL PROTEIN: 7.3 G/DL (ref 6.4–8.3)
TSH SERPL DL<=0.05 MIU/L-ACNC: 2.39 MIU/L (ref 0.3–5)
UNSATURATED IRON BINDING CAPACITY: 261 UG/DL (ref 112–347)

## 2019-04-17 DIAGNOSIS — N30.00 ACUTE CYSTITIS WITHOUT HEMATURIA: Primary | ICD-10-CM

## 2019-04-17 LAB
CULTURE: ABNORMAL
Lab: ABNORMAL
SPECIMEN DESCRIPTION: ABNORMAL

## 2019-04-17 RX ORDER — CIPROFLOXACIN 500 MG/1
500 TABLET, FILM COATED ORAL 2 TIMES DAILY
Qty: 14 TABLET | Refills: 0 | Status: SHIPPED | OUTPATIENT
Start: 2019-04-17 | End: 2019-04-24

## 2019-07-30 ENCOUNTER — TELEPHONE (OUTPATIENT)
Dept: FAMILY MEDICINE CLINIC | Age: 54
End: 2019-07-30

## 2019-07-31 NOTE — TELEPHONE ENCOUNTER
Pt informed and will call back with any new or worsening symptoms.  Will try hydrocortisone and tyleno

## 2019-08-20 ENCOUNTER — OFFICE VISIT (OUTPATIENT)
Dept: FAMILY MEDICINE CLINIC | Age: 54
End: 2019-08-20
Payer: COMMERCIAL

## 2019-08-20 VITALS
BODY MASS INDEX: 53.43 KG/M2 | WEIGHT: 283 LBS | HEIGHT: 61 IN | SYSTOLIC BLOOD PRESSURE: 118 MMHG | HEART RATE: 68 BPM | DIASTOLIC BLOOD PRESSURE: 74 MMHG | RESPIRATION RATE: 16 BRPM | OXYGEN SATURATION: 96 %

## 2019-08-20 DIAGNOSIS — L98.9 SKIN LESION: Primary | ICD-10-CM

## 2019-08-20 PROCEDURE — 99213 OFFICE O/P EST LOW 20 MIN: CPT | Performed by: NURSE PRACTITIONER

## 2019-08-20 PROCEDURE — G8417 CALC BMI ABV UP PARAM F/U: HCPCS | Performed by: NURSE PRACTITIONER

## 2019-08-20 PROCEDURE — 1036F TOBACCO NON-USER: CPT | Performed by: NURSE PRACTITIONER

## 2019-08-20 PROCEDURE — G8427 DOCREV CUR MEDS BY ELIG CLIN: HCPCS | Performed by: NURSE PRACTITIONER

## 2019-08-20 PROCEDURE — 3014F SCREEN MAMMO DOC REV: CPT | Performed by: NURSE PRACTITIONER

## 2019-08-20 PROCEDURE — 3017F COLORECTAL CA SCREEN DOC REV: CPT | Performed by: NURSE PRACTITIONER

## 2019-08-20 RX ORDER — SULFAMETHOXAZOLE AND TRIMETHOPRIM 800; 160 MG/1; MG/1
1 TABLET ORAL 2 TIMES DAILY
Qty: 14 TABLET | Refills: 0 | Status: SHIPPED | OUTPATIENT
Start: 2019-08-20 | End: 2019-08-27

## 2019-08-20 ASSESSMENT — ENCOUNTER SYMPTOMS
ABDOMINAL PAIN: 0
COUGH: 0
DIARRHEA: 0
VOMITING: 0
SHORTNESS OF BREATH: 0

## 2019-09-25 LAB
BASOPHILS ABSOLUTE: ABNORMAL /ΜL
BASOPHILS RELATIVE PERCENT: ABNORMAL %
EOSINOPHILS ABSOLUTE: ABNORMAL /ΜL
EOSINOPHILS RELATIVE PERCENT: ABNORMAL %
HCT VFR BLD CALC: 34.5 % (ref 36–46)
HEMOGLOBIN: 11.2 G/DL (ref 12–16)
LYMPHOCYTES ABSOLUTE: ABNORMAL /ΜL
LYMPHOCYTES RELATIVE PERCENT: ABNORMAL %
MCH RBC QN AUTO: 20.2 PG
MCHC RBC AUTO-ENTMCNC: 32.5 G/DL
MCV RBC AUTO: 62.2 FL
MONOCYTES ABSOLUTE: ABNORMAL /ΜL
MONOCYTES RELATIVE PERCENT: ABNORMAL %
NEUTROPHILS ABSOLUTE: ABNORMAL /ΜL
NEUTROPHILS RELATIVE PERCENT: ABNORMAL %
PLATELET # BLD: 266 K/ΜL
PMV BLD AUTO: 7.9 FL
RBC # BLD: 5.54 10^6/ΜL
WBC # BLD: 6.5 10^3/ML

## 2019-10-05 LAB
BASOPHILS ABSOLUTE: ABNORMAL /ΜL
BASOPHILS RELATIVE PERCENT: ABNORMAL %
EOSINOPHILS ABSOLUTE: ABNORMAL /ΜL
EOSINOPHILS RELATIVE PERCENT: ABNORMAL %
HCT VFR BLD CALC: 37.5 % (ref 36–46)
HEMOGLOBIN: 12.1 G/DL (ref 12–16)
LYMPHOCYTES ABSOLUTE: ABNORMAL /ΜL
LYMPHOCYTES RELATIVE PERCENT: ABNORMAL %
MCH RBC QN AUTO: 20.2 PG
MCHC RBC AUTO-ENTMCNC: 32.2 G/DL
MCV RBC AUTO: 63 FL
MONOCYTES ABSOLUTE: ABNORMAL /ΜL
MONOCYTES RELATIVE PERCENT: ABNORMAL %
NEUTROPHILS ABSOLUTE: ABNORMAL /ΜL
NEUTROPHILS RELATIVE PERCENT: ABNORMAL %
PLATELET # BLD: 270 K/ΜL
PMV BLD AUTO: 8.2 FL
RBC # BLD: 5.99 10^6/ΜL
WBC # BLD: 8.9 10^3/ML

## 2019-10-09 ENCOUNTER — TELEPHONE (OUTPATIENT)
Dept: FAMILY MEDICINE CLINIC | Age: 54
End: 2019-10-09

## 2019-10-09 DIAGNOSIS — R05.9 COUGH: Primary | ICD-10-CM

## 2019-10-09 RX ORDER — BENZONATATE 100 MG/1
100-200 CAPSULE ORAL 3 TIMES DAILY PRN
Qty: 40 CAPSULE | Refills: 0 | Status: SHIPPED | OUTPATIENT
Start: 2019-10-09 | End: 2019-10-16

## 2019-10-10 ENCOUNTER — HOSPITAL ENCOUNTER (OUTPATIENT)
Dept: GENERAL RADIOLOGY | Facility: CLINIC | Age: 54
Discharge: HOME | End: 2019-10-12
Payer: COMMERCIAL

## 2019-10-10 ENCOUNTER — OFFICE VISIT (OUTPATIENT)
Dept: FAMILY MEDICINE CLINIC | Age: 54
End: 2019-10-10
Payer: COMMERCIAL

## 2019-10-10 ENCOUNTER — HOSPITAL ENCOUNTER (OUTPATIENT)
Facility: CLINIC | Age: 54
Discharge: HOME | End: 2019-10-10
Payer: COMMERCIAL

## 2019-10-10 VITALS
RESPIRATION RATE: 16 BRPM | HEART RATE: 76 BPM | SYSTOLIC BLOOD PRESSURE: 120 MMHG | OXYGEN SATURATION: 96 % | WEIGHT: 288 LBS | DIASTOLIC BLOOD PRESSURE: 82 MMHG | TEMPERATURE: 98.5 F | BODY MASS INDEX: 54.42 KG/M2

## 2019-10-10 DIAGNOSIS — J40 BRONCHITIS: ICD-10-CM

## 2019-10-10 DIAGNOSIS — R50.9 FEVER, UNSPECIFIED FEVER CAUSE: ICD-10-CM

## 2019-10-10 DIAGNOSIS — J02.9 SORE THROAT: ICD-10-CM

## 2019-10-10 DIAGNOSIS — R05.9 COUGH: ICD-10-CM

## 2019-10-10 DIAGNOSIS — J18.9 PNEUMONITIS: Primary | ICD-10-CM

## 2019-10-10 LAB
INFLUENZA A ANTIBODY: NEGATIVE
INFLUENZA B ANTIBODY: NEGATIVE

## 2019-10-10 PROCEDURE — 3017F COLORECTAL CA SCREEN DOC REV: CPT | Performed by: NURSE PRACTITIONER

## 2019-10-10 PROCEDURE — G8484 FLU IMMUNIZE NO ADMIN: HCPCS | Performed by: NURSE PRACTITIONER

## 2019-10-10 PROCEDURE — 99214 OFFICE O/P EST MOD 30 MIN: CPT | Performed by: NURSE PRACTITIONER

## 2019-10-10 PROCEDURE — 71046 X-RAY EXAM CHEST 2 VIEWS: CPT

## 2019-10-10 PROCEDURE — 1036F TOBACCO NON-USER: CPT | Performed by: NURSE PRACTITIONER

## 2019-10-10 PROCEDURE — G8417 CALC BMI ABV UP PARAM F/U: HCPCS | Performed by: NURSE PRACTITIONER

## 2019-10-10 PROCEDURE — 3014F SCREEN MAMMO DOC REV: CPT | Performed by: NURSE PRACTITIONER

## 2019-10-10 PROCEDURE — G8427 DOCREV CUR MEDS BY ELIG CLIN: HCPCS | Performed by: NURSE PRACTITIONER

## 2019-10-10 PROCEDURE — 87804 INFLUENZA ASSAY W/OPTIC: CPT | Performed by: NURSE PRACTITIONER

## 2019-10-10 RX ORDER — PREDNISONE 50 MG/1
50 TABLET ORAL DAILY
Qty: 5 TABLET | Refills: 0 | Status: SHIPPED | OUTPATIENT
Start: 2019-10-10 | End: 2019-10-15

## 2019-10-20 ASSESSMENT — ENCOUNTER SYMPTOMS
SHORTNESS OF BREATH: 1
COUGH: 1
RHINORRHEA: 1
TROUBLE SWALLOWING: 0
DIARRHEA: 1
PHOTOPHOBIA: 0
NAUSEA: 0
WHEEZING: 0
CHEST TIGHTNESS: 1
ABDOMINAL PAIN: 0
SINUS PRESSURE: 0
VOMITING: 0
SORE THROAT: 1

## 2019-12-30 DIAGNOSIS — F33.0 MILD EPISODE OF RECURRENT MAJOR DEPRESSIVE DISORDER (HCC): ICD-10-CM

## 2019-12-30 RX ORDER — CITALOPRAM 10 MG/1
TABLET ORAL
Qty: 30 TABLET | Refills: 4 | Status: SHIPPED | OUTPATIENT
Start: 2019-12-30 | End: 2020-09-21

## 2020-01-17 ENCOUNTER — OFFICE VISIT (OUTPATIENT)
Dept: FAMILY MEDICINE CLINIC | Age: 55
End: 2020-01-17
Payer: COMMERCIAL

## 2020-01-17 VITALS
RESPIRATION RATE: 18 BRPM | BODY MASS INDEX: 55.17 KG/M2 | HEART RATE: 95 BPM | WEIGHT: 292.2 LBS | TEMPERATURE: 97.9 F | DIASTOLIC BLOOD PRESSURE: 79 MMHG | HEIGHT: 61 IN | SYSTOLIC BLOOD PRESSURE: 134 MMHG

## 2020-01-17 LAB
INFLUENZA A ANTIBODY: NEGATIVE
INFLUENZA B ANTIBODY: NEGATIVE

## 2020-01-17 PROCEDURE — 1036F TOBACCO NON-USER: CPT | Performed by: PHYSICIAN ASSISTANT

## 2020-01-17 PROCEDURE — G9899 SCRN MAM PERF RSLTS DOC: HCPCS | Performed by: PHYSICIAN ASSISTANT

## 2020-01-17 PROCEDURE — 3017F COLORECTAL CA SCREEN DOC REV: CPT | Performed by: PHYSICIAN ASSISTANT

## 2020-01-17 PROCEDURE — G8482 FLU IMMUNIZE ORDER/ADMIN: HCPCS | Performed by: PHYSICIAN ASSISTANT

## 2020-01-17 PROCEDURE — 87804 INFLUENZA ASSAY W/OPTIC: CPT | Performed by: PHYSICIAN ASSISTANT

## 2020-01-17 PROCEDURE — 99213 OFFICE O/P EST LOW 20 MIN: CPT | Performed by: PHYSICIAN ASSISTANT

## 2020-01-17 PROCEDURE — G8427 DOCREV CUR MEDS BY ELIG CLIN: HCPCS | Performed by: PHYSICIAN ASSISTANT

## 2020-01-17 PROCEDURE — G8417 CALC BMI ABV UP PARAM F/U: HCPCS | Performed by: PHYSICIAN ASSISTANT

## 2020-01-17 RX ORDER — FLUCONAZOLE 150 MG/1
150 TABLET ORAL DAILY
Qty: 3 TABLET | Refills: 0 | Status: SHIPPED | OUTPATIENT
Start: 2020-01-17 | End: 2020-01-20

## 2020-01-17 RX ORDER — BENZONATATE 200 MG/1
200 CAPSULE ORAL 3 TIMES DAILY PRN
Qty: 30 CAPSULE | Refills: 0 | Status: SHIPPED | OUTPATIENT
Start: 2020-01-17 | End: 2020-02-25

## 2020-01-17 RX ORDER — PREDNISONE 20 MG/1
20 TABLET ORAL 2 TIMES DAILY
Qty: 10 TABLET | Refills: 0 | Status: SHIPPED | OUTPATIENT
Start: 2020-01-17 | End: 2020-01-22

## 2020-01-17 RX ORDER — AMOXICILLIN 875 MG/1
875 TABLET, COATED ORAL 2 TIMES DAILY
Qty: 20 TABLET | Refills: 0 | Status: SHIPPED | OUTPATIENT
Start: 2020-01-17 | End: 2020-02-25 | Stop reason: ALTCHOICE

## 2020-01-17 ASSESSMENT — ENCOUNTER SYMPTOMS
COUGH: 1
GASTROINTESTINAL NEGATIVE: 1
HEARTBURN: 0
SHORTNESS OF BREATH: 0
SINUS PAIN: 1
EYES NEGATIVE: 1
RHINORRHEA: 0
HEMOPTYSIS: 0
SORE THROAT: 0
WHEEZING: 1
SINUS PRESSURE: 1

## 2020-01-17 NOTE — PATIENT INSTRUCTIONS
Patient Education        Sinusitis: Care Instructions  Your Care Instructions    Sinusitis is an infection of the lining of the sinus cavities in your head. Sinusitis often follows a cold. It causes pain and pressure in your head and face. In most cases, sinusitis gets better on its own in 1 to 2 weeks. But some mild symptoms may last for several weeks. Sometimes antibiotics are needed. Follow-up care is a key part of your treatment and safety. Be sure to make and go to all appointments, and call your doctor if you are having problems. It's also a good idea to know your test results and keep a list of the medicines you take. How can you care for yourself at home? · Take an over-the-counter pain medicine, such as acetaminophen (Tylenol), ibuprofen (Advil, Motrin), or naproxen (Aleve). Read and follow all instructions on the label. · If the doctor prescribed antibiotics, take them as directed. Do not stop taking them just because you feel better. You need to take the full course of antibiotics. · Be careful when taking over-the-counter cold or flu medicines and Tylenol at the same time. Many of these medicines have acetaminophen, which is Tylenol. Read the labels to make sure that you are not taking more than the recommended dose. Too much acetaminophen (Tylenol) can be harmful. · Breathe warm, moist air from a steamy shower, a hot bath, or a sink filled with hot water. Avoid cold, dry air. Using a humidifier in your home may help. Follow the directions for cleaning the machine. · Use saline (saltwater) nasal washes to help keep your nasal passages open and wash out mucus and bacteria. You can buy saline nose drops at a grocery store or drugstore. Or you can make your own at home by adding 1 teaspoon of salt and 1 teaspoon of baking soda to 2 cups of distilled water. If you make your own, fill a bulb syringe with the solution, insert the tip into your nostril, and squeeze gently. Elenita Stalls your nose.   · Put a hot, wet towel or a warm gel pack on your face 3 or 4 times a day for 5 to 10 minutes each time. · Try a decongestant nasal spray like oxymetazoline (Afrin). Do not use it for more than 3 days in a row. Using it for more than 3 days can make your congestion worse. When should you call for help? Call your doctor now or seek immediate medical care if:    · You have new or worse swelling or redness in your face or around your eyes.     · You have a new or higher fever.    Watch closely for changes in your health, and be sure to contact your doctor if:    · You have new or worse facial pain.     · The mucus from your nose becomes thicker (like pus) or has new blood in it.     · You are not getting better as expected. Where can you learn more? Go to https://[employer] [place] [employer] [place]peyumikoeb.The Flipping Pro's. org and sign in to your Key [employer]security account. Enter T885 in the Atieva box to learn more about \"Sinusitis: Care Instructions. \"     If you do not have an account, please click on the \"Sign Up Now\" link. Current as of: July 28, 2019  Content Version: 12.3  © 6335-4330 [employer]. Care instructions adapted under license by [facility] ([facility]). If you have questions about a medical condition or this instruction, always ask your healthcare professional. [place] [address] any warranty or liability for your use of this information. Patient Education        Saline Nasal Washes: Care Instructions  Your Care Instructions  Saline nasal washes help keep the nasal passages open by washing out thick or dried mucus. This simple remedy can help relieve symptoms of allergies, sinusitis, and colds. It also can make the nose feel more comfortable by keeping the mucous membranes moist. You may notice a little burning sensation in your nose the first few times you use the solution, but this usually gets better in a few days. Follow-up care is a key part of your treatment and safety.  Be sure to make and go to Incorporated disclaims any warranty or liability for your use of this information. Patient Education        Cough: Care Instructions  Your Care Instructions    A cough is your body's response to something that bothers your throat or airways. Many things can cause a cough. You might cough because of a cold or the flu, bronchitis, or asthma. Smoking, postnasal drip, allergies, and stomach acid that backs up into your throat also can cause coughs. A cough is a symptom, not a disease. Most coughs stop when the cause, such as a cold, goes away. You can take a few steps at home to cough less and feel better. Follow-up care is a key part of your treatment and safety. Be sure to make and go to all appointments, and call your doctor if you are having problems. It's also a good idea to know your test results and keep a list of the medicines you take. How can you care for yourself at home? · Drink lots of water and other fluids. This helps thin the mucus and soothes a dry or sore throat. Honey or lemon juice in hot water or tea may ease a dry cough. · Take cough medicine as directed by your doctor. · Prop up your head on pillows to help you breathe and ease a dry cough. · Try cough drops to soothe a dry or sore throat. Cough drops don't stop a cough. Medicine-flavored cough drops are no better than candy-flavored drops or hard candy. · Do not smoke. Avoid secondhand smoke. If you need help quitting, talk to your doctor about stop-smoking programs and medicines. These can increase your chances of quitting for good. When should you call for help? Call 911 anytime you think you may need emergency care.  For example, call if:    · You have severe trouble breathing.    Call your doctor now or seek immediate medical care if:    · You cough up blood.     · You have new or worse trouble breathing.     · You have a new or higher fever.     · You have a new rash.    Watch closely for changes in your health, and be sure to contact your doctor if:    · You cough more deeply or more often, especially if you notice more mucus or a change in the color of your mucus.     · You have new symptoms, such as a sore throat, an earache, or sinus pain.     · You do not get better as expected. Where can you learn more? Go to https://chpepiceweb.Lifebooker.com. org and sign in to your Playtika account. Enter D279 in the Leveler box to learn more about \"Cough: Care Instructions. \"     If you do not have an account, please click on the \"Sign Up Now\" link. Current as of: June 9, 2019  Content Version: 12.3  © 7493-7775 Healthwise, Incorporated. Care instructions adapted under license by [facility] ([facility]). If you have questions about a medical condition or this instruction, always ask your healthcare professional. [address] any warranty or liability for your use of this information.

## 2020-01-17 NOTE — PROGRESS NOTES
Except as noted above the remainder of the review of systems was reviewed andnegative. PAST MEDICAL HISTORY   History reviewed. Past Medical History:   Diagnosis Date    Back pain     RATE 10    Beta thalassemia minor     Depression     MILD    Diverticulitis     past    E-coli UTI     Fall     AT HOME 3 WEEKS AGO    Gastric ulcer     resolved    GERD (gastroesophageal reflux disease)     resolved    Hyperlipidemia     PONV (postoperative nausea and vomiting)     REQUEST PREOP MEDS FOR N/V    Snores     Wears glasses          SURGICAL HISTORY     History reviewed. Past Surgical History:   Procedure Laterality Date    ANKLE SURGERY Right 10/2018    ANKLE [facility] Right [facility]/24/[facility] Dr Afshin Hoskins - hardware removal    BACK SURGERY       SECTION      CHOLECYSTECTOMY      ENDOSCOPY, COLON, DIAGNOSTIC      LUMBAR DISCECTOMY      L3-4    OTHER SURGICAL HISTORY  2015    vertebral augmentation T11    SHOULDER SURGERY Left     fx    SHADY AND BSO      UPPER GASTROINTESTINAL ENDOSCOPY  2011         CURRENT MEDICATIONS       Current Outpatient Medications   Medication Sig Dispense Refill    amoxicillin (AMOXIL) 875 MG tablet Take 1 tablet by mouth 2 times daily 20 tablet 0    predniSONE (DELTASONE) 20 MG tablet Take 1 tablet by mouth 2 times daily for 5 days 10 tablet 0    fluconazole (DIFLUCAN) 150 MG tablet Take 1 tablet by mouth daily for 3 days Take 1 tablet on day 1 and 1 tablet on day 2. If symptoms persist take 1 additional tablet.  3 tablet 0    benzonatate (TESSALON) 200 MG capsule Take 1 capsule by mouth 3 times daily as needed for Cough 30 capsule 0    citalopram (CELEXA) 10 MG tablet TAKE ONE TABLET BY MOUTH DAILY 30 tablet 4    albuterol sulfate (PROAIR RESPICLICK) 556 (90 Base) MCG/ACT aerosol powder inhalation Inhale 2 puffs into the lungs every 6 hours as needed for Wheezing or Shortness of Breath 1 Inhaler 0    atorvastatin (LIPITOR) 20 tenderness present. Left Sinus: Maxillary sinus tenderness and frontal sinus tenderness present. Mouth/Throat:      Mouth: Mucous membranes are moist.      Pharynx: Posterior oropharyngeal erythema present. Tonsils: No tonsillar exudate or tonsillar abscesses. Swellin+ on the right. 2+ on the left. Eyes:      Extraocular Movements: Extraocular movements intact. Conjunctiva/sclera: Conjunctivae normal.      Pupils: Pupils are equal, round, and reactive to light. Cardiovascular:      Rate and Rhythm: Normal rate and regular rhythm. Pulses: Normal pulses. Heart sounds: Normal heart sounds. Pulmonary:      Effort: Pulmonary effort is normal.      Breath sounds: Normal breath sounds. Abdominal:      Palpations: Abdomen is soft. Skin:     General: Skin is warm and dry. Neurological:      Mental Status: She is alert and oriented to person, place, and time. DIFFERENTIAL DIAGNOSIS:       Reba Thomas reviewed the disposition diagnosis with the patient and or their family/guardian. I have answered their questions and given discharge instructions. They voiced understanding of these instructions and did not have anyfurther questions or complaints. PROCEDURES:  Orders Placed This Encounter   Procedures    POCT Influenza A/B       Results for orders placed or performed in visit on 20   POCT Influenza A/B   Result Value Ref Range    Influenza A Ab Negative     Influenza B Ab Negative            FINALIMPRESSION      1. Acute bacterial sinusitis    2. Flu-like symptoms    3. Cough            PLAN     Return if symptoms worsen or fail to improve.         DISCHARGEMEDICATIONS:  Orders Placed This Encounter   Medications    amoxicillin (AMOXIL) 875 MG tablet     Sig: Take 1 tablet by mouth 2 times daily     Dispense:  20 tablet     Refill:  0    predniSONE (DELTASONE) 20 MG tablet     Sig: Take 1 tablet by mouth 2 times daily for 5 days     Dispense:  10 tablet

## 2020-02-24 ENCOUNTER — TELEPHONE (OUTPATIENT)
Dept: FAMILY MEDICINE CLINIC | Age: 55
End: 2020-02-24

## 2020-02-25 ENCOUNTER — HOSPITAL ENCOUNTER (OUTPATIENT)
Age: 55
Setting detail: SPECIMEN
Discharge: HOME | End: 2020-02-25
Payer: COMMERCIAL

## 2020-02-25 ENCOUNTER — OFFICE VISIT (OUTPATIENT)
Dept: FAMILY MEDICINE CLINIC | Age: 55
End: 2020-02-25
Payer: COMMERCIAL

## 2020-02-25 VITALS
SYSTOLIC BLOOD PRESSURE: 122 MMHG | DIASTOLIC BLOOD PRESSURE: 74 MMHG | RESPIRATION RATE: 16 BRPM | BODY MASS INDEX: 55.2 KG/M2 | HEART RATE: 85 BPM | WEIGHT: 292 LBS | TEMPERATURE: 98.9 F

## 2020-02-25 LAB
ALBUMIN SERPL-MCNC: 4.2 G/DL (ref 3.5–5.2)
ALBUMIN/GLOBULIN RATIO: 1.3 (ref 1–2.5)
ALP BLD-CCNC: 77 U/L (ref 35–104)
ALT SERPL-CCNC: 40 U/L (ref 5–33)
ANION GAP SERPL CALCULATED.3IONS-SCNC: 16 MMOL/L (ref 9–17)
AST SERPL-CCNC: 25 U/L
BILIRUB SERPL-MCNC: 0.48 MG/DL (ref 0.3–1.2)
BUN BLDV-MCNC: 17 MG/DL (ref 6–20)
BUN/CREAT BLD: ABNORMAL (ref 9–20)
CALCIUM SERPL-MCNC: 9.8 MG/DL (ref 8.6–10.4)
CHLORIDE BLD-SCNC: 101 MMOL/L (ref 98–107)
CHOLESTEROL/HDL RATIO: 2.7
CHOLESTEROL: 162 MG/DL
CO2: 24 MMOL/L (ref 20–31)
CREAT SERPL-MCNC: 0.49 MG/DL (ref 0.5–0.9)
ESTIMATED AVERAGE GLUCOSE: 128 MG/DL
FERRITIN: 152 UG/L (ref 13–150)
GFR AFRICAN AMERICAN: >60 ML/MIN
GFR NON-AFRICAN AMERICAN: >60 ML/MIN
GFR SERPL CREATININE-BSD FRML MDRD: ABNORMAL ML/MIN/{1.73_M2}
GFR SERPL CREATININE-BSD FRML MDRD: ABNORMAL ML/MIN/{1.73_M2}
GLUCOSE BLD-MCNC: 120 MG/DL (ref 70–99)
HBA1C MFR BLD: 6.1 % (ref 4–6)
HCT VFR BLD CALC: 43.6 % (ref 36.3–47.1)
HDLC SERPL-MCNC: 61 MG/DL
HEMOGLOBIN: 12.4 G/DL (ref 11.9–15.1)
IRON SATURATION: 25 % (ref 20–55)
IRON: 89 UG/DL (ref 37–145)
LDL CHOLESTEROL: 73 MG/DL (ref 0–130)
MCH RBC QN AUTO: 20 PG (ref 25.2–33.5)
MCHC RBC AUTO-ENTMCNC: 28.4 G/DL (ref 28.4–34.8)
MCV RBC AUTO: 70.4 FL (ref 82.6–102.9)
NRBC AUTOMATED: 0.5 PER 100 WBC
PDW BLD-RTO: 22.5 % (ref 11.8–14.4)
PLATELET # BLD: ABNORMAL K/UL (ref 138–453)
PLATELET, FLUORESCENCE: 407 K/UL (ref 138–453)
PLATELET, IMMATURE FRACTION: 3.5 % (ref 1.1–10.3)
PMV BLD AUTO: ABNORMAL FL (ref 8.1–13.5)
POTASSIUM SERPL-SCNC: 4.5 MMOL/L (ref 3.7–5.3)
RBC # BLD: 6.19 M/UL (ref 3.95–5.11)
SODIUM BLD-SCNC: 141 MMOL/L (ref 135–144)
TOTAL IRON BINDING CAPACITY: 357 UG/DL (ref 250–450)
TOTAL PROTEIN: 7.5 G/DL (ref 6.4–8.3)
TRIGL SERPL-MCNC: 139 MG/DL
TSH SERPL DL<=0.05 MIU/L-ACNC: 4.4 MIU/L (ref 0.3–5)
UNSATURATED IRON BINDING CAPACITY: 268 UG/DL (ref 112–347)
VITAMIN B-12: 581 PG/ML (ref 232–1245)
VITAMIN D 25-HYDROXY: 34.8 NG/ML (ref 30–100)
VLDLC SERPL CALC-MCNC: NORMAL MG/DL (ref 1–30)
WBC # BLD: 6.2 K/UL (ref 3.5–11.3)

## 2020-02-25 PROCEDURE — G8427 DOCREV CUR MEDS BY ELIG CLIN: HCPCS | Performed by: NURSE PRACTITIONER

## 2020-02-25 PROCEDURE — 1036F TOBACCO NON-USER: CPT | Performed by: NURSE PRACTITIONER

## 2020-02-25 PROCEDURE — 3017F COLORECTAL CA SCREEN DOC REV: CPT | Performed by: NURSE PRACTITIONER

## 2020-02-25 PROCEDURE — G8417 CALC BMI ABV UP PARAM F/U: HCPCS | Performed by: NURSE PRACTITIONER

## 2020-02-25 PROCEDURE — G9899 SCRN MAM PERF RSLTS DOC: HCPCS | Performed by: NURSE PRACTITIONER

## 2020-02-25 PROCEDURE — G8482 FLU IMMUNIZE ORDER/ADMIN: HCPCS | Performed by: NURSE PRACTITIONER

## 2020-02-25 PROCEDURE — 99213 OFFICE O/P EST LOW 20 MIN: CPT | Performed by: NURSE PRACTITIONER

## 2020-02-25 RX ORDER — MULTIVIT WITH MINERALS/LUTEIN
250 TABLET ORAL DAILY
COMMUNITY
End: 2022-10-19 | Stop reason: ALTCHOICE

## 2020-02-25 RX ORDER — CALCIUM CARBONATE 500(1250)
500 TABLET ORAL DAILY
COMMUNITY

## 2020-02-25 ASSESSMENT — ENCOUNTER SYMPTOMS
COUGH: 0
CHEST TIGHTNESS: 0
SWOLLEN GLANDS: 0
VOMITING: 0
TROUBLE SWALLOWING: 0
WHEEZING: 0
BLOOD IN STOOL: 0
CHANGE IN BOWEL HABIT: 0
CONSTIPATION: 0
NAUSEA: 0
ABDOMINAL PAIN: 0
SHORTNESS OF BREATH: 1
SORE THROAT: 0
DIARRHEA: 0
RHINORRHEA: 0
SINUS PRESSURE: 0

## 2020-02-25 ASSESSMENT — PATIENT HEALTH QUESTIONNAIRE - PHQ9
1. LITTLE INTEREST OR PLEASURE IN DOING THINGS: 0
2. FEELING DOWN, DEPRESSED OR HOPELESS: 0
SUM OF ALL RESPONSES TO PHQ9 QUESTIONS 1 & 2: 0
SUM OF ALL RESPONSES TO PHQ QUESTIONS 1-9: 0
SUM OF ALL RESPONSES TO PHQ QUESTIONS 1-9: 0

## 2020-02-25 NOTE — PROGRESS NOTES
swelling. Skin: Negative for rash. Neurological: Positive for headaches. Psychiatric/Behavioral: Negative for sleep disturbance. Sleeping at least 12 hours per night and doesn't feel enough. Objective:     /74 (Site: Right Lower Arm, Position: Sitting, Cuff Size: Medium Adult)   Pulse 85   Temp 98.9 °F (37.2 °C) (Tympanic)   Wt 292 lb (132.5 kg)   LMP 09/11/2002   BMI 55.20 kg/m²      Physical Exam  Vitals signs and nursing note reviewed. Constitutional:       General: She is not in acute distress. Appearance: She is well-developed. She is not ill-appearing, toxic-appearing or diaphoretic. HENT:      Head: Normocephalic and atraumatic. Right Ear: Tympanic membrane and external ear normal.      Left Ear: Tympanic membrane and external ear normal.      Nose: Nose normal.      Mouth/Throat:      Mouth: Mucous membranes are moist.      Pharynx: Oropharynx is clear. Eyes:      General:         Right eye: No discharge. Left eye: No discharge. Conjunctiva/sclera: Conjunctivae normal.   Neck:      Musculoskeletal: Neck supple. Cardiovascular:      Rate and Rhythm: Normal rate and regular rhythm. Pulses:           Radial pulses are 2+ on the right side and 2+ on the left side. Heart sounds: Normal heart sounds. Pulmonary:      Effort: Pulmonary effort is normal. No respiratory distress. Breath sounds: Normal breath sounds. No transmitted upper airway sounds. No decreased breath sounds, wheezing, rhonchi or rales. Abdominal:      General: Bowel sounds are normal. There is no distension. Palpations: Abdomen is soft. Tenderness: There is no abdominal tenderness. Musculoskeletal:      Comments: No red or swollen joints   Skin:     General: Skin is warm and dry. Findings: No rash. Neurological:      Mental Status: She is alert and oriented to person, place, and time.    Psychiatric:         Behavior: Behavior normal. Assessment / Plan:     1. Fatigue, unspecified type    Educated possible viral etiology as acute onset in last 7 days with body aches. Declined swab for Influenza. Recommend rest and adequate fluid intake  Monitor for worsening symptoms  Will complete routine fasting labs today.     - TSH without Reflex; Future  - Ferritin; Future  - Iron and TIBC; Future  - Vitamin D 25 Hydroxy; Future  - Vitamin B12; Future    2. Muscle ache    Likely viral etiology. OTC Tylenol as needed for pain. 3. Viral illness    Will monitor symptoms. 4. Gastroesophageal reflux disease without esophagitis    Will complete routine fasting labs today. - CBC; Future  - Ferritin; Future  - Iron and TIBC; Future  - Vitamin B12; Future    5. Vitamin D deficiency    - Vitamin D 25 Hydroxy; Future    6. Mixed hyperlipidemia    - Lipid Panel; Future  - Comprehensive Metabolic Panel; Future  - CBC; Future    7. Morbid obesity ([address]    Going to [facility] weight loss seminar tomorrow evening.     - Lipid Panel; Future  - Comprehensive Metabolic Panel; Future  - Hemoglobin A1C; Future  - CBC; Future        Call office with concerns      Jocelyn Barr received counseling on the following healthy behaviors: nutrition, exercise and medication adherence  Reviewed prior labs and health maintenance. Continue current medications, diet and exercise. Discussed use, benefit, and side effects of prescribed medications. Barriers to medication compliance addressed. Patient given educational materials - see patient instructions. All patient questions answered. Patient voiced understanding.        Electronically signed by ANA LAURA Obregon CNP on 2/25/2020 at 7:44 AM

## 2020-02-27 RX ORDER — ATORVASTATIN CALCIUM 20 MG/1
20 TABLET, FILM COATED ORAL DAILY
Qty: 30 TABLET | Refills: 11 | Status: SHIPPED | OUTPATIENT
Start: 2020-02-27 | End: 2021-02-19 | Stop reason: SDUPTHER

## 2020-03-04 ENCOUNTER — TELEPHONE (OUTPATIENT)
Dept: FAMILY MEDICINE CLINIC | Age: 55
End: 2020-03-04

## 2020-03-04 NOTE — TELEPHONE ENCOUNTER
Patient was in office 02/25/2020   Labs normal    Patient called c/o still having   Low grade fever 99.0-99.2 , achy, tired X 2 weeks  Stated maybe UTI   Negative  for  frequent, burning, retention, foul smell, and hoa color. Patient states she drinks lots of water all day everyday? Just do not feel good.        Patient requested advise

## 2020-03-04 NOTE — TELEPHONE ENCOUNTER
She can drop off a urine for UA and culture if she wants to ensure no UTI. Could still be viral though at 2 week mahad. Continue rest and adequate fluid intake. Try to get about 30 minutes exercise per day can also help.

## 2020-03-05 ENCOUNTER — HOSPITAL ENCOUNTER (OUTPATIENT)
Age: 55
Setting detail: SPECIMEN
Discharge: HOME | End: 2020-03-05
Payer: COMMERCIAL

## 2020-03-06 LAB
-: ABNORMAL
AMORPHOUS: ABNORMAL
BACTERIA: ABNORMAL
BILIRUBIN URINE: NEGATIVE
CASTS UA: ABNORMAL /LPF (ref 0–2)
COLOR: YELLOW
COMMENT UA: ABNORMAL
CRYSTALS, UA: ABNORMAL /HPF
EPITHELIAL CELLS UA: ABNORMAL /HPF (ref 0–5)
GLUCOSE URINE: NEGATIVE
KETONES, URINE: NEGATIVE
LEUKOCYTE ESTERASE, URINE: NEGATIVE
MUCUS: ABNORMAL
NITRITE, URINE: NEGATIVE
OTHER OBSERVATIONS UA: ABNORMAL
PH UA: 6 (ref 5–8)
PROTEIN UA: NEGATIVE
RBC UA: ABNORMAL /HPF (ref 0–2)
RENAL EPITHELIAL, UA: ABNORMAL /HPF
SPECIFIC GRAVITY UA: 1.02 (ref 1–1.03)
TRICHOMONAS: ABNORMAL
TURBIDITY: ABNORMAL
URINE HGB: NEGATIVE
UROBILINOGEN, URINE: NORMAL
WBC UA: ABNORMAL /HPF (ref 0–5)
YEAST: ABNORMAL

## 2020-03-07 LAB
CULTURE: ABNORMAL
Lab: ABNORMAL
SPECIMEN DESCRIPTION: ABNORMAL

## 2020-03-09 RX ORDER — NITROFURANTOIN 25; 75 MG/1; MG/1
100 CAPSULE ORAL 2 TIMES DAILY
Qty: 14 CAPSULE | Refills: 0 | Status: SHIPPED | OUTPATIENT
Start: 2020-03-09 | End: 2020-03-16

## 2020-03-09 RX ORDER — NITROFURANTOIN 25; 75 MG/1; MG/1
100 CAPSULE ORAL 2 TIMES DAILY
COMMUNITY
End: 2020-03-09

## 2020-03-09 NOTE — TELEPHONE ENCOUNTER
----- Message from ANA LAURA Mckinney CNP sent at 3/9/2020  7:16 AM EDT -----  She does have UTI. Needs to start Macrobid 100 mg twice daily x7 days.

## 2020-04-07 ENCOUNTER — OFFICE VISIT (OUTPATIENT)
Dept: PRIMARY CARE CLINIC | Age: 55
End: 2020-04-07
Payer: COMMERCIAL

## 2020-04-07 ENCOUNTER — HOSPITAL ENCOUNTER (OUTPATIENT)
Age: 55
Setting detail: SPECIMEN
Discharge: HOME | End: 2020-04-07
Payer: COMMERCIAL

## 2020-04-07 VITALS
WEIGHT: 280 LBS | SYSTOLIC BLOOD PRESSURE: 127 MMHG | BODY MASS INDEX: 54.97 KG/M2 | TEMPERATURE: 98.3 F | OXYGEN SATURATION: 97 % | HEART RATE: 62 BPM | DIASTOLIC BLOOD PRESSURE: 74 MMHG | HEIGHT: 60 IN

## 2020-04-07 LAB
INFLUENZA A ANTIBODY: NEGATIVE
INFLUENZA B ANTIBODY: NEGATIVE

## 2020-04-07 PROCEDURE — 3017F COLORECTAL CA SCREEN DOC REV: CPT | Performed by: NURSE PRACTITIONER

## 2020-04-07 PROCEDURE — G8417 CALC BMI ABV UP PARAM F/U: HCPCS | Performed by: NURSE PRACTITIONER

## 2020-04-07 PROCEDURE — 99214 OFFICE O/P EST MOD 30 MIN: CPT | Performed by: NURSE PRACTITIONER

## 2020-04-07 PROCEDURE — G8427 DOCREV CUR MEDS BY ELIG CLIN: HCPCS | Performed by: NURSE PRACTITIONER

## 2020-04-07 PROCEDURE — 87804 INFLUENZA ASSAY W/OPTIC: CPT | Performed by: NURSE PRACTITIONER

## 2020-04-07 PROCEDURE — 1036F TOBACCO NON-USER: CPT | Performed by: NURSE PRACTITIONER

## 2020-04-07 PROCEDURE — G9899 SCRN MAM PERF RSLTS DOC: HCPCS | Performed by: NURSE PRACTITIONER

## 2020-04-07 ASSESSMENT — ENCOUNTER SYMPTOMS
COUGH: 1
SHORTNESS OF BREATH: 1
WHEEZING: 0
SINUS PRESSURE: 0
EYE REDNESS: 0
CHEST TIGHTNESS: 0
EYE DISCHARGE: 0
VOICE CHANGE: 0
SORE THROAT: 0

## 2020-04-07 NOTE — PATIENT INSTRUCTIONS
school, work, or public areas. And don't use public transportation. · If you are sick:  ? Leave your home only if you need to get medical care. But call the doctor's office first so they know you're coming. And wear a face mask, if you have one.  ? If you have a face mask, wear it whenever you're around other people. It can help stop the spread of the virus when you cough or sneeze. ? Clean and disinfect your home every day. Use household  and disinfectant wipes or sprays. Take special care to clean things that you grab with your hands. These include doorknobs, remote controls, phones, and handles on your refrigerator and microwave. And don't forget countertops, tabletops, bathrooms, and computer keyboards. When to call for help  Call 911 anytime you think you may need emergency care. For example, call if:  · You have severe trouble breathing. (You can't talk at all.)  · You have constant chest pain or pressure. · You are severely dizzy or lightheaded. · You are confused or can't think clearly. · Your face and lips have a blue color. · You pass out (lose consciousness) or are very hard to wake up. Call your doctor now if you develop symptoms such as:  · Shortness of breath. · Fever. · Cough. If you need to get care, call ahead to the doctor's office for instructions before you go. Make sure you wear a face mask, if you have one, to prevent exposing other people to the virus. Where can you get the latest information? The following health organizations are tracking and studying this virus. Their websites contain the most up-to-date information. Michelet Peguero also learn what to do if you think you may have been exposed to the virus. · U.S. Centers for Disease Control and Prevention (CDC): The CDC provides updated news about the disease and travel advice. The website also tells you how to prevent the spread of infection.  www.cdc.gov  · World Health Organization [facility]): WHO offers information about the being too weak to stand; and having cold, pale, clammy skin.    Watch closely for changes in your health, and be sure to contact your doctor if:    · Your symptoms get worse.     · You are not getting better as expected.     Call before you go to the doctor's office. Follow their instructions. And wear a face mask if you have one. Current as of: April 1, 2020               Content Version: 12.4  © 2006-2020 Healthwise, Incorporated. Care instructions adapted under license by your healthcare professional. If you have questions about a medical condition or this instruction, always ask your healthcare professional. [address] any warranty or liability for your use of this information.

## 2020-04-09 ENCOUNTER — CARE COORDINATION (OUTPATIENT)
Dept: CARE COORDINATION | Age: 55
End: 2020-04-09

## 2020-04-09 LAB
SARS-COV-2, NAA: NOT DETECTED
SARS-COV-2, PCR: NORMAL
SARS-COV-2: NORMAL
SOURCE: NORMAL

## 2020-07-07 ENCOUNTER — OFFICE VISIT (OUTPATIENT)
Dept: FAMILY MEDICINE CLINIC | Age: 55
End: 2020-07-07
Payer: COMMERCIAL

## 2020-07-07 PROBLEM — S82.63XA CLOSED FRACTURE OF LATERAL MALLEOLUS: Status: ACTIVE | Noted: 2020-07-07

## 2020-07-07 PROBLEM — F32.9 MAJOR DEPRESSION, SINGLE EPISODE: Status: ACTIVE | Noted: 2020-07-07

## 2020-07-07 PROBLEM — R53.83 FATIGUE: Status: ACTIVE | Noted: 2020-07-07

## 2020-07-07 PROBLEM — F32.A DEPRESSION: Status: ACTIVE | Noted: 2020-07-07

## 2020-07-07 PROBLEM — E78.00 PURE HYPERCHOLESTEROLEMIA: Status: ACTIVE | Noted: 2020-07-07

## 2020-07-07 PROBLEM — D56.9 THALASSEMIA: Status: ACTIVE | Noted: 2020-07-07
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
## 2020-07-07 PROBLEM — Z12.31 ENCOUNTER FOR SCREENING MAMMOGRAM FOR MALIGNANT NEOPLASM OF BREAST: Status: ACTIVE | Noted: 2020-07-07

## 2020-07-07 PROBLEM — M25.579 PAIN IN JOINT INVOLVING ANKLE AND FOOT: Status: ACTIVE | Noted: 2020-07-07

## 2020-07-07 PROBLEM — M85.80 OSTEOPENIA: Status: ACTIVE | Noted: 2020-07-07

## 2020-07-07 PROBLEM — M65.332 ACQUIRED TRIGGER FINGER OF LEFT MIDDLE FINGER: Status: ACTIVE | Noted: 2020-07-07

## 2020-07-07 PROBLEM — E78.00 HYPERCHOLESTEROLEMIA: Status: ACTIVE | Noted: 2020-07-07

## 2020-07-07 PROBLEM — Z87.898 HISTORY OF UNSTEADY GAIT: Status: ACTIVE | Noted: 2020-07-07

## 2020-07-07 PROBLEM — R05.9 COUGH: Status: ACTIVE | Noted: 2020-07-07

## 2020-07-07 PROBLEM — R50.9 FEVER: Status: ACTIVE | Noted: 2020-07-07

## 2020-07-07 PROBLEM — T85.848A PAIN DUE TO INTERNAL PROSTHETIC DEVICE: Status: ACTIVE | Noted: 2020-07-07

## 2020-07-07 PROBLEM — K21.9 GASTROESOPHAGEAL REFLUX DISEASE: Status: ACTIVE | Noted: 2020-07-07

## 2020-07-07 PROCEDURE — G8427 DOCREV CUR MEDS BY ELIG CLIN: HCPCS | Performed by: NURSE PRACTITIONER

## 2020-07-07 PROCEDURE — 3017F COLORECTAL CA SCREEN DOC REV: CPT | Performed by: NURSE PRACTITIONER

## 2020-07-07 PROCEDURE — G8417 CALC BMI ABV UP PARAM F/U: HCPCS | Performed by: NURSE PRACTITIONER

## 2020-07-07 PROCEDURE — 99213 OFFICE O/P EST LOW 20 MIN: CPT | Performed by: NURSE PRACTITIONER

## 2020-07-07 PROCEDURE — 1036F TOBACCO NON-USER: CPT | Performed by: NURSE PRACTITIONER

## 2020-07-07 PROCEDURE — G9899 SCRN MAM PERF RSLTS DOC: HCPCS | Performed by: NURSE PRACTITIONER

## 2020-07-07 RX ORDER — ATORVASTATIN CALCIUM 10 MG/1
10 TABLET, FILM COATED ORAL DAILY
COMMUNITY
End: 2020-07-07

## 2020-07-07 RX ORDER — FUROSEMIDE 20 MG/1
20 TABLET ORAL PRN
Qty: 90 TABLET | Refills: 1 | Status: CANCELLED | OUTPATIENT
Start: 2020-07-07 | End: 2021-07-07

## 2020-07-07 RX ORDER — ATORVASTATIN CALCIUM 40 MG/1
40 TABLET, FILM COATED ORAL
COMMUNITY
Start: 2019-10-05 | End: 2020-07-07

## 2020-07-07 RX ORDER — CHOLECALCIFEROL (VITAMIN D3) 10(400)/ML
DROPS ORAL
COMMUNITY
End: 2022-10-19 | Stop reason: ALTCHOICE

## 2020-07-07 RX ORDER — CITALOPRAM 10 MG/1
TABLET ORAL
Qty: 90 TABLET | Refills: 1 | Status: CANCELLED | OUTPATIENT
Start: 2020-07-07

## 2020-07-07 RX ORDER — CITALOPRAM 10 MG/1
TABLET ORAL
COMMUNITY
End: 2020-07-07

## 2020-07-07 SDOH — ECONOMIC STABILITY: FOOD INSECURITY: WITHIN THE PAST 12 MONTHS, THE FOOD YOU BOUGHT JUST DIDN'T LAST AND YOU DIDN'T HAVE MONEY TO GET MORE.: NEVER TRUE

## 2020-07-07 SDOH — ECONOMIC STABILITY: INCOME INSECURITY: HOW HARD IS IT FOR YOU TO PAY FOR THE VERY BASICS LIKE FOOD, HOUSING, MEDICAL CARE, AND HEATING?: NOT HARD AT ALL

## 2020-07-07 SDOH — ECONOMIC STABILITY: FOOD INSECURITY: WITHIN THE PAST 12 MONTHS, YOU WORRIED THAT YOUR FOOD WOULD RUN OUT BEFORE YOU GOT MONEY TO BUY MORE.: NEVER TRUE

## 2020-07-07 SDOH — ECONOMIC STABILITY: TRANSPORTATION INSECURITY
IN THE PAST 12 MONTHS, HAS THE LACK OF TRANSPORTATION KEPT YOU FROM MEDICAL APPOINTMENTS OR FROM GETTING MEDICATIONS?: NO

## 2020-07-07 SDOH — ECONOMIC STABILITY: TRANSPORTATION INSECURITY
IN THE PAST 12 MONTHS, HAS LACK OF TRANSPORTATION KEPT YOU FROM MEETINGS, WORK, OR FROM GETTING THINGS NEEDED FOR DAILY LIVING?: NO

## 2020-07-07 ASSESSMENT — ENCOUNTER SYMPTOMS
VOMITING: 0
DIARRHEA: 0
SORE THROAT: 0
COUGH: 0
TROUBLE SWALLOWING: 0
NAUSEA: 0
SHORTNESS OF BREATH: 0
ABDOMINAL PAIN: 1
BELCHING: 0
CONSTIPATION: 0
RHINORRHEA: 0

## 2020-07-07 ASSESSMENT — PATIENT HEALTH QUESTIONNAIRE - PHQ9
2. FEELING DOWN, DEPRESSED OR HOPELESS: 0
1. LITTLE INTEREST OR PLEASURE IN DOING THINGS: 0
SUM OF ALL RESPONSES TO PHQ QUESTIONS 1-9: 0
SUM OF ALL RESPONSES TO PHQ9 QUESTIONS 1 & 2: 0
SUM OF ALL RESPONSES TO PHQ QUESTIONS 1-9: 0

## 2020-07-07 NOTE — PROGRESS NOTES
tablet 11    calcium carbonate (OSCAL) 500 MG TABS tablet Take 500 mg by mouth daily      Ascorbic Acid (VITAMIN C) 250 MG tablet Take 250 mg by mouth daily      citalopram (CELEXA) 10 MG tablet TAKE ONE TABLET BY MOUTH DAILY 30 tablet 4    albuterol sulfate (PROAIR RESPICLICK) 720 (90 Base) MCG/ACT aerosol powder inhalation Inhale 2 puffs into the lungs every 6 hours as needed for Wheezing or Shortness of Breath 1 Inhaler 0    naproxen (NAPROSYN) 500 MG tablet Take 1 tablet by mouth 2 times daily as needed for Pain 60 tablet 5    Cholecalciferol (VITAMIN D3) 5000 units TABS Take 1 tablet by mouth daily 30 tablet 5    furosemide (LASIX) 20 MG tablet Take 1 tablet by mouth daily (Patient taking differently: Take 20 mg by mouth as needed ) 30 tablet 2     No current facility-administered medications for this visit. Patient ID: Tori Pappas is a 47 y.o. female. Patient presents in office today with concerns about intermittent right lower quadrant abdominal pains. Started about 2 weeks ago. Gets sharp pains that last about 30 seconds then goes away. Appetite ok. No diarrhea, constipation, nausea and vomiting. Has not been taking naproxen because irritated her stomach. Uses Tylenol if needed. No increased bloating or gas. Abdominal Pain   This is a new problem. The current episode started 1 to 4 weeks ago. The problem occurs intermittently. The pain is located in the RLQ. The pain is at a severity of 7/10. The quality of the pain is sharp. The abdominal pain does not radiate. Pertinent negatives include no belching, constipation, diarrhea, dysuria, fever, frequency, headaches, hematuria, nausea or vomiting. Nothing aggravates the pain. The pain is relieved by nothing. Review of Systems   Constitutional: Negative for appetite change and fever. HENT: Negative for rhinorrhea, sore throat and trouble swallowing. Respiratory: Negative for cough and shortness of breath.     Cardiovascular: Negative for chest pain. Gastrointestinal: Positive for abdominal pain. Negative for constipation, diarrhea, nausea and vomiting. Genitourinary: Negative for decreased urine volume, difficulty urinating, dysuria, flank pain, frequency, hematuria, vaginal bleeding, vaginal discharge and vaginal pain. Skin: Negative for rash. Neurological: Negative for dizziness and headaches. Psychiatric/Behavioral: Negative for sleep disturbance. Objective:     LMP 09/11/2002      Physical Exam  Vitals signs reviewed. Constitutional:       Appearance: She is obese. She is not ill-appearing or diaphoretic. HENT:      Head: Normocephalic and atraumatic. Right Ear: External ear normal.      Left Ear: External ear normal.      Nose: Nose normal.   Cardiovascular:      Rate and Rhythm: Normal rate and regular rhythm. Pulmonary:      Effort: Pulmonary effort is normal. No respiratory distress. Breath sounds: Normal breath sounds. No wheezing. Abdominal:      General: Abdomen is protuberant. Bowel sounds are normal.      Palpations: Abdomen is soft. There is no mass. Tenderness: There is abdominal tenderness in the right lower quadrant. There is no right CVA tenderness, left CVA tenderness, guarding or rebound. Hernia: No hernia is present. Skin:     General: Skin is warm and dry. Findings: No rash. Neurological:      Mental Status: She is alert and oriented to person, place, and time. Psychiatric:         Mood and Affect: Mood normal.         Behavior: Behavior normal.         Assessment / Plan:     1. Right lower quadrant pain     Intermittent, nonspecific sharp pains to RLQ. Will obtain CT abdomen/pelvis for further evaluation.    - CT ABDOMEN PELVIS W IV CONTRAST Additional Contrast? None; Future  Monitor for worsening symptoms  Call office with concerns          Ly Wagoner received counseling on the following healthy behaviors: medication adherence  Reviewed prior labs and health maintenance. Continue current medications, diet and exercise. Discussed use, benefit, and side effects of prescribed medications. Barriers to medication compliance addressed. Patient given educational materials - see patient instructions. All patient questions answered. Patient voiced understanding.        Electronically signed by ANA LAURA Vera CNP on 7/7/2020 at 1:12 PM

## 2020-07-19 ENCOUNTER — NURSE TRIAGE (OUTPATIENT)
Dept: OTHER | Age: 55
End: 2020-07-19

## 2020-07-20 ENCOUNTER — TELEPHONE (OUTPATIENT)
Dept: FAMILY MEDICINE CLINIC | Age: 55
End: 2020-07-20

## 2020-07-20 NOTE — TELEPHONE ENCOUNTER
Please obtain ER records so I can review. Did they give her any medication? I would like her to get a pelvic ultrasound for further evaluation of pain.

## 2020-07-20 NOTE — TELEPHONE ENCOUNTER
ER reorders requested from [address] [place] [address] , orders pended for editing and approval, LVM for patient to contact office.

## 2020-07-20 NOTE — TELEPHONE ENCOUNTER
Orders pended for Editing and Approval     Added Pelvic complete transabdominal / transvaginal for insurance will cover correct Ultrasound tech clarified from [facility]. Patient Scheduled for Tomorrow for Ultrasound to be completed at 1030 am will be there by 10 am new order needs to be Faxed to [facility] with those notes added [address] Fax [phone] Patient notified she is scheduled tomorrow for US but will schedule woth Office once completed she has to work at 2 and started a new job so she cant miss work patient stated so will get US complete then follow up with office patient stated pain is still there did not mention no medication given ER reports requested for provider to review from    [facility] as well.

## 2020-07-20 NOTE — TELEPHONE ENCOUNTER
Patient called the office this morning stating she was at [facility] yesterday for stomach pain. She states they resulted on her testing and told her everything was normal. Patient would like to know what to do next. Please advise.

## 2020-08-06 PROBLEM — Z12.31 ENCOUNTER FOR SCREENING MAMMOGRAM FOR MALIGNANT NEOPLASM OF BREAST: Status: RESOLVED | Noted: 2020-07-07 | Resolved: 2020-08-06

## 2020-08-06 PROBLEM — R05.9 COUGH: Status: RESOLVED | Noted: 2020-07-07 | Resolved: 2020-08-06
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
## 2020-08-13 ENCOUNTER — TELEPHONE (OUTPATIENT)
Dept: ADMINISTRATIVE | Age: 55
End: 2020-08-13

## 2020-09-25 ENCOUNTER — HOSPITAL ENCOUNTER (OUTPATIENT)
Dept: PREADMISSION TESTING | Age: 55
Setting detail: SPECIMEN
Discharge: HOME | End: 2020-09-29
Payer: COMMERCIAL

## 2020-09-25 PROCEDURE — U0003 INFECTIOUS AGENT DETECTION BY NUCLEIC ACID (DNA OR RNA); SEVERE ACUTE RESPIRATORY SYNDROME CORONAVIRUS 2 (SARS-COV-2) (CORONAVIRUS DISEASE [COVID-19]), AMPLIFIED PROBE TECHNIQUE, MAKING USE OF HIGH THROUGHPUT TECHNOLOGIES AS DESCRIBED BY CMS-2020-01-R: HCPCS

## 2020-09-26 LAB — SARS-COV-2, NAA: NOT DETECTED

## 2020-09-29 ENCOUNTER — ANESTHESIA EVENT (OUTPATIENT)
Dept: ENDOSCOPY | Age: 55
End: 2020-09-29
Payer: COMMERCIAL

## 2020-09-29 ENCOUNTER — HOSPITAL ENCOUNTER (OUTPATIENT)
Age: 55
Setting detail: OUTPATIENT SURGERY
Discharge: HOME | End: 2020-09-29
Attending: INTERNAL MEDICINE | Admitting: INTERNAL MEDICINE
Payer: COMMERCIAL

## 2020-09-29 ENCOUNTER — ANESTHESIA (OUTPATIENT)
Dept: ENDOSCOPY | Age: 55
End: 2020-09-29
Payer: COMMERCIAL

## 2020-09-29 VITALS
OXYGEN SATURATION: 96 % | SYSTOLIC BLOOD PRESSURE: 114 MMHG | RESPIRATION RATE: 19 BRPM | DIASTOLIC BLOOD PRESSURE: 80 MMHG

## 2020-09-29 VITALS
BODY MASS INDEX: 52.87 KG/M2 | RESPIRATION RATE: 13 BRPM | OXYGEN SATURATION: 100 % | WEIGHT: 280 LBS | HEIGHT: 61 IN | SYSTOLIC BLOOD PRESSURE: 134 MMHG | TEMPERATURE: 96 F | HEART RATE: 60 BPM | DIASTOLIC BLOOD PRESSURE: 83 MMHG

## 2020-09-29 PROCEDURE — 3700000001 HC ADD 15 MINUTES (ANESTHESIA): Performed by: INTERNAL MEDICINE

## 2020-09-29 PROCEDURE — 2580000003 HC RX 258: Performed by: INTERNAL MEDICINE

## 2020-09-29 PROCEDURE — 7100000011 HC PHASE II RECOVERY - ADDTL 15 MIN: Performed by: INTERNAL MEDICINE

## 2020-09-29 PROCEDURE — 3700000000 HC ANESTHESIA ATTENDED CARE: Performed by: INTERNAL MEDICINE

## 2020-09-29 PROCEDURE — 7100000010 HC PHASE II RECOVERY - FIRST 15 MIN: Performed by: INTERNAL MEDICINE

## 2020-09-29 PROCEDURE — 2709999900 HC NON-CHARGEABLE SUPPLY: Performed by: INTERNAL MEDICINE

## 2020-09-29 PROCEDURE — 2500000003 HC RX 250 WO HCPCS: Performed by: NURSE ANESTHETIST, CERTIFIED REGISTERED

## 2020-09-29 PROCEDURE — 6360000002 HC RX W HCPCS: Performed by: NURSE ANESTHETIST, CERTIFIED REGISTERED

## 2020-09-29 PROCEDURE — 3609010300 HC COLONOSCOPY W/BIOPSY SINGLE/MULTIPLE: Performed by: INTERNAL MEDICINE

## 2020-09-29 PROCEDURE — 88305 TISSUE EXAM BY PATHOLOGIST: CPT

## 2020-09-29 RX ORDER — GLYCOPYRROLATE 1 MG/5 ML
SYRINGE (ML) INTRAVENOUS PRN
Status: DISCONTINUED | OUTPATIENT
Start: 2020-09-29 | End: 2020-09-29 | Stop reason: SDUPTHER

## 2020-09-29 RX ORDER — PROPOFOL 10 MG/ML
INJECTION, EMULSION INTRAVENOUS PRN
Status: DISCONTINUED | OUTPATIENT
Start: 2020-09-29 | End: 2020-09-29 | Stop reason: SDUPTHER

## 2020-09-29 RX ORDER — LIDOCAINE HYDROCHLORIDE 10 MG/ML
INJECTION, SOLUTION EPIDURAL; INFILTRATION; INTRACAUDAL; PERINEURAL PRN
Status: DISCONTINUED | OUTPATIENT
Start: 2020-09-29 | End: 2020-09-29 | Stop reason: SDUPTHER

## 2020-09-29 RX ORDER — ONDANSETRON 2 MG/ML
INJECTION INTRAMUSCULAR; INTRAVENOUS PRN
Status: DISCONTINUED | OUTPATIENT
Start: 2020-09-29 | End: 2020-09-29 | Stop reason: SDUPTHER

## 2020-09-29 RX ORDER — SODIUM CHLORIDE 9 MG/ML
INJECTION, SOLUTION INTRAVENOUS CONTINUOUS
Status: DISCONTINUED | OUTPATIENT
Start: 2020-09-29 | End: 2020-09-29 | Stop reason: HOSPADM

## 2020-09-29 RX ADMIN — ONDANSETRON 4 MG: 2 INJECTION, SOLUTION INTRAMUSCULAR; INTRAVENOUS at 08:32

## 2020-09-29 RX ADMIN — LIDOCAINE HYDROCHLORIDE 50 MG: 10 INJECTION, SOLUTION EPIDURAL; INFILTRATION; INTRACAUDAL; PERINEURAL at 08:35

## 2020-09-29 RX ADMIN — PROPOFOL 130 MG: 10 INJECTION, EMULSION INTRAVENOUS at 08:37

## 2020-09-29 RX ADMIN — Medication 0.2 MG: at 08:39

## 2020-09-29 RX ADMIN — PROPOFOL 70 MG: 10 INJECTION, EMULSION INTRAVENOUS at 08:35

## 2020-09-29 RX ADMIN — SODIUM CHLORIDE: 9 INJECTION, SOLUTION INTRAVENOUS at 07:37

## 2020-09-29 RX ADMIN — PROPOFOL 200 MG: 10 INJECTION, EMULSION INTRAVENOUS at 08:45

## 2020-09-29 ASSESSMENT — PAIN - FUNCTIONAL ASSESSMENT: PAIN_FUNCTIONAL_ASSESSMENT: 0-10

## 2020-09-29 ASSESSMENT — PAIN SCALES - GENERAL
PAINLEVEL_OUTOF10: 0

## 2020-09-29 NOTE — H&P
History and Physical    Pt Name: Sigrid Boateng  MRN: [id number]  YOB: 1965  Date of evaluation: 9/29/2020  Primary Care Physician: ANA LAURA Aguayo CNP    SUBJECTIVE:   History of Chief Complaint:    Sigrid Boateng is a 54 y.o. female who is scheduled today for colonoscopy. She complains of abdominal pain, mostly RLQ, sometimes LLQ, describes as intermittent and stabbing for the past 4 months. She reports negative imaging. She has had cholecystectomy in the past. She reports history of diverticulosis. She also reports intermittent diarrhea. Denies melena or hematochezia. Allergies  is allergic to bacitracin; percocet [oxycodone-acetaminophen]; tramadol; vicodin [hydrocodone-acetaminophen]; and codeine. Medications  Prior to Admission medications    Medication Sig Start Date End Date Taking?  Authorizing Provider   citalopram (CELEXA) 10 MG tablet TAKE ONE TABLET BY MOUTH DAILY 9/21/20  Yes Bipin Dawkins MD   Cholecalciferol (VITAMIN D) 10 MCG/ML LIQD    Yes Historical Provider, MD   atorvastatin (LIPITOR) 20 MG tablet Take 1 tablet by mouth daily 2/27/20 2/27/21 Yes ANA LAURA Luis CNP   calcium carbonate (OSCAL) 500 MG TABS tablet Take 500 mg by mouth daily   Yes Historical Provider, MD   Ascorbic Acid (VITAMIN C) 250 MG tablet Take 250 mg by mouth daily   Yes Historical Provider, MD   naproxen (NAPROSYN) 500 MG tablet Take 1 tablet by mouth 2 times daily as needed for Pain 3/13/18 7/7/20  ANA LAURA Luis CNP   Cholecalciferol (VITAMIN D3) 5000 units TABS Take 1 tablet by mouth daily 11/3/17 7/7/20  ANA LAURA Luis CNP   furosemide (LASIX) 20 MG tablet Take 1 tablet by mouth daily  Patient taking differently: Take 20 mg by mouth as needed  10/19/17 2/25/21  ANA LAURA Luis CNP     Past Medical History    has a past medical history of Back pain, Beta thalassemia minor, Depression, Diverticulitis, E-coli UTI, Fall, Gastric ulcer, GERD (gastroesophageal reflux disease), Hyperlipidemia, Obesity, PONV (postoperative nausea and vomiting), Snores, and Wears glasses. Past Surgical History   has a past surgical history that includes Cholecystectomy; Upper gastrointestinal endoscopy (2011); back surgery; Endoscopy, colon, diagnostic; shoulder surgery (Left);  section; lumbar discectomy; other surgical history (2015); moshe and bso (cervix removed); Ankle surgery (Right, 10/2018); Ankle surgery (Right, 10/24/2019); and Colonoscopy. Social History   reports that she quit smoking about 9 years ago. Her smoking use included cigarettes. She has a 3.00 pack-year smoking history. She has never used smokeless tobacco.   reports current alcohol use. reports no history of drug use. Marital Status    Children one  Occupation   Family History  Family Status   Relation Name Status    Mother      Father      Sister  Alive    Brother  Alive    MGM      MGF      [address]      PGF      Sister  (Not Specified)     family history includes Diabetes in her sister; Other in her sister. OBJECTIVE:   VITALS:  height is 5' 1\" (1.549 m) and weight is 280 lb (127 kg). Her infrared temperature is 96 °F (35.6 °C). Her blood pressure is 122/71 and her pulse is 63. Her respiration is 17 and oxygen saturation is 95%. CONSTITUTIONAL:Alert and orientated to person, place and time. No acute distress. Friendly. SKIN:  Warm & dry, no rashes on exposed skin  HEENT: HEAD: Normocephalic, atraumatic        EYES:  PERRL, EOMs intact, conjunctiva clear, wearing glasses       EARS:  Equal bilaterally, no edema or thickening, skin is intact without lumps or lesions. No discharge.       NOSE:  Nares patent, septum midline, no rhinorrhea      MOUTH/THROAT:  Mucous membranes moist, tongue is pink, uvula midline, teeth appear to be intact, large tongue  NECK:  Supple, no lymphadenopathy, full ROM, short  LUNGS: Respirations even and non-labored. Clear to auscultation bilaterally, no wheezes/rales/rhonchi   CARDIOVASCULAR: regular rate and rhythm, no murmurs/rubs/gallops   ABDOMEN: soft, non-tender, non-distended, bowel sounds active x 4 , obese  MUSCULOSKELETAL: Full ROM bilateral upper extremities, Full ROM bilateral lower extremities. Strength of 5/5 bilateral upper extremities. Strength 5/5 bilateral lower extremities. VASCULAR:  Brisk cap refill bilateral fingers. Radial pulses are intact, 2+ bilaterally. Dorsalis pedis pulse 2+ bilaterally. No edema or varicosities bilateral lower extremities  NEUROLOGIC: CN II-XII are grossly intact. Gait not assessed. IMPRESSIONS:   Abdominal pain  Change in bowel habits      Diagnosis Date    Back pain     RATE 10    Beta thalassemia minor     Depression     MILD    Diverticulitis     past    E-coli UTI 2010    Fall     AT HOME 3 WEEKS AGO    Gastric ulcer     resolved    GERD (gastroesophageal reflux disease)     resolved    Hyperlipidemia     Obesity     PONV (postoperative nausea and vomiting)     REQUEST PREOP MEDS FOR N/V    Snores     Wears glasses    1.    PLANS:   Colonoscopy     PHUONG ROGERS  Electronically signed 9/29/2020 at 7:53 AM

## 2020-09-29 NOTE — OP NOTE
Operative Note      Patient: Chris Persaud  YOB: 1965  MRN: [id number]    Date of Procedure: 9/29/2020    Pre-Op Diagnosis: CHANGE IN BOWEL HABITS    Post-Op Diagnosis: Diverticulosis. Hemorrhoids       Procedure(s):  COLONOSCOPY WITH BIOPSY    Surgeon(s):  Sunny Baltazar DO    Assistant:   First Assistant: Aileen Dickson RN    Anesthesia: Monitor Anesthesia Care    Estimated Blood Loss (mL): Minimal    Complications: None    Findings:  Normal ileum. Normal colonic mucosa- bx'd. A few sigmoid diverticuli. Small internal hemorrhoids    Description of Procedure:  Informed consent was obtained from the patient after explanation of the procedure including indications, description of the procedure,  benefits and possible risks and complications of the procedure, and alternatives. Questions were answered. The patient's history was reviewed and a directed physical examination was performed prior to the procedure. Patient was monitored throughout the procedure with pulse oximetry and periodic assessment of vital signs. Patient was sedated as noted above. With the patient initially in the left lateral decubitus position, a digital rectal examination was performed and revealed negative without mass, lesions or tenderness. The Olympus video colonoscope was placed in the patient's rectum and advanced without difficulty  to the terminal ileum. The prep was excellent. Examination of the mucosa was performed during both introduction and withdrawal of the colonoscope. Retroflexed view of the rectum was performed. The patient  was taken to the recovery area in good condition. Withdrawal time 14 minutes.      Specimens:   ID Type Source Tests Collected by Time Destination   A : Random Colon  Bx 's Tissue Colon SURGICAL PATHOLOGY Sunny Baltazar DO 9/29/2020 8562        Implants:  * No implants in log *      Drains: * No LDAs found *

## 2020-09-29 NOTE — ANESTHESIA POSTPROCEDURE EVALUATION
Department of Anesthesiology  Postprocedure Note    Patient: Melissa Garay  MRN: [id number]  YOB: 1965  Date of evaluation: 9/29/2020  Time:  11:01 AM     Procedure Summary     Date:  09/29/20 Room / Location:  [address]    Anesthesia Start:  0831 Anesthesia Stop:  7485    Procedure:  COLONOSCOPY WITH BIOPSY (N/A ) Diagnosis:  (CHANGE IN BOWEL HABITS)    Surgeon:  Arlen Bowman DO Responsible Provider:  Juan Dotson MD    Anesthesia Type:  general, MAC ASA Status:  3          Anesthesia Type: general, MAC    Albertina Phase I: Albertina Score: 10    Albertina Phase II: Albertina Score: 10    Last vitals: Reviewed and per EMR flowsheets.        Anesthesia Post Evaluation    Patient location during evaluation: bedside (Endoscopy Unit)  Patient participation: complete - patient participated  Level of consciousness: awake and alert  Pain score: 0  Airway patency: patent  Nausea & Vomiting: no nausea and no vomiting  Complications: no  Cardiovascular status: hemodynamically stable  Respiratory status: acceptable, spontaneous ventilation and room air  Hydration status: euvolemic

## 2020-09-29 NOTE — ANESTHESIA PRE PROCEDURE
E78.5    Thalassemia minor D56.3    Varicose veins I83.90    Lumbago M54.5    Vitamin D deficiency E55.9    Morbid obesity (HCC) E66.01    Depression F32.9    Acquired trigger finger of left middle finger M65.332    Closed fracture of lateral malleolus S82.63XA    Fatigue R53.83    Fever R50.9    Fracture with nonunion [id number]    History of unsteady gait Z87.898    Major depression, single episode F32.9    Osteopenia M85.80    Pain due to internal prosthetic device T85.848A    Pain in joint involving ankle and foot M25.579    Gastroesophageal reflux disease K21.9    Hypercholesterolemia E78.00    Pure hypercholesterolemia E78.00    Low back pain M54.5    Thalassemia D56.9       Past Medical History:        Diagnosis Date    Back pain     RATE 10    Beta thalassemia minor     Depression     MILD    Diverticulitis     past    E-coli UTI     Fall     AT HOME 3 WEEKS AGO    Gastric ulcer     resolved    GERD (gastroesophageal reflux disease)     resolved    Hyperlipidemia     PONV (postoperative nausea and vomiting)     REQUEST PREOP MEDS FOR N/V    Snores     Wears glasses        Past Surgical History:        Procedure Laterality Date    ANKLE SURGERY Right 10/2018    ANKLE [facility] Right [facility] Dr Rajani Cooley - hardware removal    BACK SURGERY       SECTION      CHOLECYSTECTOMY      ENDOSCOPY, COLON, DIAGNOSTIC      LUMBAR DISCECTOMY      L3-4    OTHER SURGICAL HISTORY  2015    vertebral augmentation T11    SHOULDER SURGERY Left     fx    SHADY AND BSO      UPPER GASTROINTESTINAL ENDOSCOPY  2011       Social History:    Social History     Tobacco Use    Smoking status: Former Smoker     Packs/day: 0.50     Years: 6.00     Pack years: 3.00     Types: Cigarettes     Last attempt to quit: 2011     Years since quittin.1    Smokeless tobacco: Never Used   Substance Use Topics    Alcohol use: Yes     Comment: rarely uses this Counseling given: Not Answered      Vital Signs (Current):   Vitals:    09/29/20 0725 09/29/20 0730   BP: 122/71    Pulse: 63    Resp: 17    Temp: 96 °F (35.6 °C)    TempSrc: Infrared    SpO2: 95%    Weight: 280 lb (127 kg)    Height:  5' 1\" (1.549 m)                                              BP Readings from Last 3 Encounters:   09/29/20 122/71   04/07/20 127/74   02/25/20 122/74       NPO Status: Time of last liquid consumption: 0300                        Time of last solid consumption: 1900                        Date of last liquid consumption: 09/29/20                        Date of last solid food consumption: 09/26/20    BMI:   Wt Readings from Last 3 Encounters:   09/29/20 280 lb (127 kg)   04/07/20 280 lb (127 kg)   02/25/20 292 lb (132.5 kg)     Body mass index is 52.91 kg/m². CBC:   Lab Results   Component Value Date    WBC 6.2 02/25/2020    RBC 6.19 02/25/2020    HGB 12.4 02/25/2020    HCT 43.6 02/25/2020    MCV 70.4 02/25/2020    RDW 22.5 02/25/2020    PLT See Reflexed IPF Result 02/25/2020       CMP:   Lab Results   Component Value Date     02/25/2020    K 4.5 02/25/2020     02/25/2020    CO2 24 02/25/2020    BUN 17 02/25/2020    CREATININE 0.49 02/25/2020    GFRAA >60 02/25/2020    LABGLOM >60 02/25/2020    GLUCOSE 120 02/25/2020    PROT 7.5 02/25/2020    CALCIUM 9.8 02/25/2020    BILITOT 0.48 02/25/2020    ALKPHOS 77 02/25/2020    AST 25 02/25/2020    ALT 40 02/25/2020       POC Tests: No results for input(s): POCGLU, POCNA, POCK, POCCL, POCBUN, POCHEMO, POCHCT in the last 72 hours.     Coags: No results found for: PROTIME, INR, APTT    HCG (If Applicable): No results found for: PREGTESTUR, PREGSERUM, HCG, HCGQUANT     ABGs: No results found for: PHART, PO2ART, JHC0RLU, JIM2VOL, BEART, H2GENBNW     Type & Screen (If Applicable):  No results found for: LABABO, LABRH    Drug/Infectious Status (If Applicable):  Lab Results   Component Value Date    HEPCAB NONREACTIVE 03/22/2017       COVID-19 Screening (If Applicable):   Lab Results   Component Value Date    COVID19 Not Detected 09/25/2020         Anesthesia Evaluation  Patient summary reviewed and Nursing notes reviewed  Airway: Mallampati: II  TM distance: >3 FB   Neck ROM: full  Mouth opening: > = 3 FB Dental: normal exam         Pulmonary:normal exam  breath sounds clear to auscultation                             Cardiovascular:  Exercise tolerance: good (>4 METS),           Rhythm: regular  Rate: normal                    Neuro/Psych:               GI/Hepatic/Renal:             Endo/Other:                     Abdominal:       Abdomen: soft. Vascular:                                        Anesthesia Plan      general and MAC     ASA 3       Induction: intravenous. MIPS: Postoperative opioids intended and Prophylactic antiemetics administered. Anesthetic plan and risks discussed with patient. Use of blood products discussed with patient whom consented to blood products. Plan discussed with attending and CRNA.     Attending anesthesiologist reviewed and agrees with Shreyas Johnston MD   9/29/2020

## 2020-09-30 LAB — SURGICAL PATHOLOGY REPORT: NORMAL

## 2020-11-03 PROBLEM — K21.9 GERD (GASTROESOPHAGEAL REFLUX DISEASE): Status: RESOLVED | Noted: 2020-11-03 | Resolved: 2020-11-03

## 2020-11-03 PROBLEM — F32.A DEPRESSION: Status: RESOLVED | Noted: 2020-07-07 | Resolved: 2020-11-03

## 2020-11-17 ENCOUNTER — OFFICE VISIT (OUTPATIENT)
Dept: FAMILY MEDICINE CLINIC | Age: 55
End: 2020-11-17
Payer: COMMERCIAL

## 2020-11-17 VITALS
OXYGEN SATURATION: 97 % | TEMPERATURE: 98 F | HEIGHT: 61 IN | SYSTOLIC BLOOD PRESSURE: 116 MMHG | WEIGHT: 272.6 LBS | DIASTOLIC BLOOD PRESSURE: 68 MMHG | HEART RATE: 65 BPM | BODY MASS INDEX: 51.47 KG/M2

## 2020-11-17 PROCEDURE — 90471 IMMUNIZATION ADMIN: CPT | Performed by: STUDENT IN AN ORGANIZED HEALTH CARE EDUCATION/TRAINING PROGRAM

## 2020-11-17 PROCEDURE — 90686 IIV4 VACC NO PRSV 0.5 ML IM: CPT | Performed by: STUDENT IN AN ORGANIZED HEALTH CARE EDUCATION/TRAINING PROGRAM

## 2020-11-17 PROCEDURE — 99213 OFFICE O/P EST LOW 20 MIN: CPT | Performed by: STUDENT IN AN ORGANIZED HEALTH CARE EDUCATION/TRAINING PROGRAM

## 2020-11-17 RX ORDER — DICYCLOMINE HCL 20 MG
TABLET ORAL
COMMUNITY
Start: 2020-09-01 | End: 2022-08-10

## 2020-11-17 RX ORDER — IBUPROFEN 800 MG/1
800 TABLET ORAL 2 TIMES DAILY PRN
Qty: 60 TABLET | Refills: 0 | Status: SHIPPED | OUTPATIENT
Start: 2020-11-17 | End: 2020-12-14

## 2020-11-17 RX ORDER — MELATONIN
2000 DAILY
Qty: 90 TABLET | Refills: 1 | Status: SHIPPED | OUTPATIENT
Start: 2020-11-17 | End: 2021-09-15

## 2020-11-17 ASSESSMENT — ENCOUNTER SYMPTOMS
BACK PAIN: 0
COUGH: 0
CONSTIPATION: 0
ABDOMINAL DISTENTION: 0
DIARRHEA: 0
CHEST TIGHTNESS: 0
WHEEZING: 0
SHORTNESS OF BREATH: 0
SORE THROAT: 0
ABDOMINAL PAIN: 0

## 2020-11-17 NOTE — PROGRESS NOTES
Visit Information    Have you changed or started any medications since your last visit including any over-the-counter medicines, vitamins, or herbal medicines? no   Have you stopped taking any of your medications? Is so, why? -  no  Are you having any side effects from any of your medications? - no    Have you seen any other physician or provider since your last visit?  no   Have you had any other diagnostic tests since your last visit?  no   Have you been seen in the emergency room and/or had an admission in a hospital since we last saw you?  no   Have you had your routine dental cleaning in the past 6 months?  yes - 09/20     Do you have an active MyChart account? If no, what is the barrier?   Yes    Patient Care Team:  ANA LAURA Tucker CNP as PCP - General (Certified Nurse Practitioner)  ANA LAURA Tucker CNP as PCP - [facility][1[facility]  Ros Scott RN as Care Transitions Nurse    Medical History Review  Past Medical, Family, and Social History reviewed and does contribute to the patient presenting condition    Health Maintenance   Topic Date Due    Flu vaccine (1) 09/01/2020    HIV screen  07/07/2021 (Originally 9/15/1980)    A1C test (Diabetic or Prediabetic)  02/25/2021    Lipid screen  02/25/2021    Potassium monitoring  02/25/2021    Creatinine monitoring  02/25/2021    Breast cancer screen  05/24/2021    DTaP/Tdap/Td vaccine (3 - Td) 11/03/2027    Colon cancer screen colonoscopy  09/29/2030    Shingles Vaccine  Completed    Hepatitis C screen  Completed    Hepatitis A vaccine  Aged Out    Hepatitis B vaccine  Aged Out    Hib vaccine  Aged Out    Meningococcal (ACWY) vaccine  Aged Out    Pneumococcal 0-64 years Vaccine  Aged Out

## 2020-11-17 NOTE — PATIENT INSTRUCTIONS
CBD oil Garden of life formulated by a neurologist so it is not snake oil  You can start with 10 mg and go up to 100 mg  There are any side effects worse case it does nothing that is case that helps to sleep and helps with the back pain      Patient Education        Learning About the [address] Diet  What is the [address]? The Mediterranean diet is a style of eating rather than a diet plan. It features foods eaten in [place], [place], [place] and [place], and other countries along the [facility]. It emphasizes eating foods like fish, fruits, vegetables, beans, high-fiber breads and whole grains, nuts, and olive oil. This style of eating includes limited red meat, cheese, and sweets. Why choose the Mediterranean diet? A Mediterranean-style diet may improve heart health. It contains more fat than other heart-healthy diets. But the fats are mainly from nuts, unsaturated oils (such as fish oils and olive oil), and certain nut or seed oils (such as canola, soybean, or flaxseed oil). These fats may help protect the heart and blood vessels. How can you get started on the Mediterranean diet? Here are some things you can do to switch to a more Mediterranean way of eating. What to eat  · Eat a variety of fruits and vegetables each day, such as grapes, blueberries, tomatoes, broccoli, peppers, figs, olives, spinach, eggplant, beans, lentils, and chickpeas. · Eat a variety of whole-grain foods each day, such as oats, brown rice, and whole wheat bread, pasta, and couscous. · Eat fish at least 2 times a week. Try tuna, salmon, mackerel, lake trout, herring, or sardines. · Eat moderate amounts of low-fat dairy products, such as milk, cheese, or yogurt. · Eat moderate amounts of poultry and eggs. · Choose healthy (unsaturated) fats, such as nuts, olive oil, and certain nut or seed oils like canola, soybean, and flaxseed.   · Limit unhealthy (saturated) fats, such as butter, palm oil, and coconut oil. And limit fats found in animal products, such as meat and dairy products made with whole milk. Try to eat red meat only a few times a month in very small amounts. · Limit sweets and desserts to only a few times a week. This includes sugar-sweetened drinks like soda. The Mediterranean diet may also include red wine with your meal--1 glass each day for women and up to 2 glasses a day for men. Tips for eating at home  · Use herbs, spices, garlic, lemon zest, and citrus juice instead of salt to add flavor to foods. · Add avocado slices to your sandwich instead of win. · Have fish for lunch or dinner instead of red meat. Brush the fish with olive oil, and broil or grill it. · Sprinkle your salad with seeds or nuts instead of cheese. · Cook with olive or canola oil instead of butter or oils that are high in saturated fat. · Switch from 2% milk or whole milk to 1% or fat-free milk. · Dip raw vegetables in a vinaigrette dressing or hummus instead of dips made from mayonnaise or sour cream.  · Have a piece of fruit for dessert instead of a piece of cake. Try baked apples, or have some dried fruit. Tips for eating out  · Try broiled, grilled, baked, or poached fish instead of having it fried or breaded. · Ask your  to have your meals prepared with olive oil instead of butter. · Order dishes made with marinara sauce or sauces made from olive oil. Avoid sauces made from cream or mayonnaise. · Choose whole-grain breads, whole wheat pasta and pizza crust, brown rice, beans, and lentils. · Cut back on butter or margarine on bread. Instead, you can dip your bread in a small amount of olive oil. · Ask for a side salad or grilled vegetables instead of french fries or chips. Where can you learn more? Go to https://[email or web address].[email or web address] org and sign in to your [employer] account. Enter [phone] in the [facility] box to learn more about \"Learning About the Mediterranean Diet. \"     If you do not have an account, please click on the \"Sign Up Now\" link. Current as of: August 22, 2019               Content Version: 12.6  © 1507-9234 [employer]. Care instructions adapted under license by [facility] ([facility]). If you have questions about a medical condition or this instruction, always ask your healthcare professional. [address] any warranty or liability for your use of this information.        I think you are having a muscle strain  The intermittent pain that you feel in the back of your leg is probably nerve related  I would like to get imaging of your back just to see if there is any infected bone structures  Switch from naproxen to ibuprofen I have sent a prescription to [facility]  Try to CBD oil that I mention  Keep using heat therapy  Go to the physical therapist of your choice you really massage therapy more than anything else    Please visit the weight management bariatric clinic I sent a referral for    Try the Mediterranean diet I sent

## 2020-11-17 NOTE — PROGRESS NOTES
@[facility]@      11/17/2020      Carter Ma is a 54 y.o. female here for the following evaluation regarding the following medical concerns:    HPI: Back pain right sided, 2 months in duration and appears to have sciatica symptoms down the right leg pain behind the knee feels like a ball of ice. Alleviating factors are heat pack and naproxen aggravating factors are walking. She has had discectomy and kyphoplasty in the past but she cannot recall which year she appears to have suffered a vertebral compression fracture. Her depression appears to be stable she is on Celexa she has been on this for about 5 years following her total abdominal hysterectomy which led to mood lability/depression symptoms    She has no homicidal or suicidal ideations    12 point ROS is negative other than noted in HPI with the exception of occasional lower extremity edema      Review of Systems   Constitutional: Negative for chills, fatigue and fever. HENT: Negative for congestion, postnasal drip and sore throat. Eyes: Negative for visual disturbance. Respiratory: Negative for cough, chest tightness, shortness of breath and wheezing. Cardiovascular: Negative. Gastrointestinal: Negative for abdominal distention, abdominal pain, constipation and diarrhea. Genitourinary: Negative for difficulty urinating, dysuria, frequency and urgency. Musculoskeletal: Negative for arthralgias, back pain and joint swelling. Skin: Negative for rash. Neurological: Negative for dizziness, weakness and light-headedness. Psychiatric/Behavioral: Negative for agitation, decreased concentration and sleep disturbance. Physical Exam  Vitals signs and nursing note reviewed. Constitutional:       Appearance: Normal appearance. She is obese. HENT:      Head: Normocephalic and atraumatic. Eyes:      Extraocular Movements: Extraocular movements intact.    Neck:      Musculoskeletal: Normal range of motion and neck supple. Pulmonary:      Effort: Pulmonary effort is normal.   Abdominal:      General: Abdomen is flat. Palpations: Abdomen is soft. Musculoskeletal: Normal range of motion. Comments: Straight leg test negative bilaterally femoral nerve stretch test negative bilaterally anterior posterior drawer test negative Parmjit's test negative hip range of motion intact   Skin:     General: Skin is warm. Neurological:      General: No focal deficit present. Mental Status: She is alert and oriented to person, place, and time. Psychiatric:         Mood and Affect: Mood normal.         Behavior: Behavior normal.         Thought Content: Thought content normal.         Judgment: Judgment normal.          Prior to Visit Medications    Medication Sig Taking? Authorizing Provider   dicyclomine (BENTYL) 20 MG tablet  Yes Historical Provider, MD   ibuprofen (ADVIL;MOTRIN) 800 MG tablet Take 1 tablet by mouth 2 times daily as needed for Pain Yes Drea Monroy MD   vitamin D3 (CHOLECALCIFEROL) 25 MCG (1000 UT) TABS tablet Take 2 tablets by mouth daily Yes Drea Monroy MD   citalopram (CELEXA) 10 MG tablet TAKE ONE TABLET BY MOUTH DAILY Yes Sigrid Ford MD   Cholecalciferol (VITAMIN D) 10 MCG/ML LIQD  Yes Historical Provider, MD   atorvastatin (LIPITOR) 20 MG tablet Take 1 tablet by mouth daily Yes ANA LAURA Verdin CNP   calcium carbonate (OSCAL) 500 MG TABS tablet Take 500 mg by mouth daily Yes Historical Provider, MD   Ascorbic Acid (VITAMIN C) 250 MG tablet Take 250 mg by mouth daily Yes Historical Provider, MD        Social History     Tobacco Use    Smoking status: Former Smoker     Packs/day: 0.50     Years: 6.00     Pack years: 3.00     Types: Cigarettes     Last attempt to quit: 2011     Years since quittin.2    Smokeless tobacco: Never Used   Substance Use Topics    Alcohol use: Yes     Comment: rarely uses this       Body mass index is 51.51 kg/m².     Vitals: 11/17/20 0752   BP: 116/68   Site: Left Upper Arm   Position: Sitting   Cuff Size: Large Adult   Pulse: 65   Temp: 98 °F (36.7 °C)   TempSrc: Temporal   SpO2: 97%   Weight: 272 lb 9.6 oz (123.7 kg)   Height: 5' 1\" (1.549 m)        Forest Coon was seen today for lower back pain. Diagnoses and all orders for this visit:    Acute right-sided low back pain with sciatica, sciatica laterality unspecified  -     ibuprofen (ADVIL;MOTRIN) 800 MG tablet; Take 1 tablet by mouth 2 times daily as needed for Pain  -     XR LUMBAR SPINE (MIN 4 VIEWS); Future  -     [facility] [place]    Vitamin D deficiency  -     vitamin D3 (CHOLECALCIFEROL) 25 MCG (1000 UT) TABS tablet;  Take 2 tablets by mouth daily    BMI 50.0-59.9, adult ([facility] [address]  -     [place] - Lucy Sapp DO, Weight Management and [facility]    Needs flu shot  -     INFLUENZA, QUADV, 3 YRS AND OLDER, IM PF, PREFILL SYR OR SDV, 0.5ML ([facility] [facility], PF)    I think you are having a muscle strain  The intermittent pain that you feel in the back of your leg is probably nerve related  I would like to get imaging of your back just to see if there is any infected bone structures  Switch from naproxen to ibuprofen I have sent a prescription to [facility]  Try to CBD oil that I mention  Keep using heat therapy  Go to the physical therapist of your choice you really massage therapy more than anything else  See bariatric surgeon/weight loss management clinic  Massage therapy referral also sent its next-door    Okay to stop taking Melissa Jeronimo MD

## 2020-11-27 ENCOUNTER — HOSPITAL ENCOUNTER (OUTPATIENT)
Age: 55
Setting detail: SPECIMEN
Discharge: HOME | End: 2020-11-27
Payer: COMMERCIAL

## 2020-11-27 ENCOUNTER — OFFICE VISIT (OUTPATIENT)
Dept: PRIMARY CARE CLINIC | Age: 55
End: 2020-11-27
Payer: COMMERCIAL

## 2020-11-27 VITALS
HEIGHT: 61 IN | RESPIRATION RATE: 16 BRPM | BODY MASS INDEX: 51.35 KG/M2 | WEIGHT: 272 LBS | SYSTOLIC BLOOD PRESSURE: 136 MMHG | HEART RATE: 64 BPM | TEMPERATURE: 98.2 F | OXYGEN SATURATION: 96 % | DIASTOLIC BLOOD PRESSURE: 82 MMHG

## 2020-11-27 PROCEDURE — 99214 OFFICE O/P EST MOD 30 MIN: CPT | Performed by: NURSE PRACTITIONER

## 2020-11-27 PROCEDURE — 3017F COLORECTAL CA SCREEN DOC REV: CPT | Performed by: NURSE PRACTITIONER

## 2020-11-27 PROCEDURE — G8427 DOCREV CUR MEDS BY ELIG CLIN: HCPCS | Performed by: NURSE PRACTITIONER

## 2020-11-27 PROCEDURE — G9899 SCRN MAM PERF RSLTS DOC: HCPCS | Performed by: NURSE PRACTITIONER

## 2020-11-27 PROCEDURE — 1036F TOBACCO NON-USER: CPT | Performed by: NURSE PRACTITIONER

## 2020-11-27 PROCEDURE — G8482 FLU IMMUNIZE ORDER/ADMIN: HCPCS | Performed by: NURSE PRACTITIONER

## 2020-11-27 PROCEDURE — G8417 CALC BMI ABV UP PARAM F/U: HCPCS | Performed by: NURSE PRACTITIONER

## 2020-11-27 RX ORDER — ALBUTEROL SULFATE 90 UG/1
2 AEROSOL, METERED RESPIRATORY (INHALATION) EVERY 6 HOURS PRN
Qty: 1 INHALER | Refills: 0 | Status: SHIPPED | OUTPATIENT
Start: 2020-11-27 | End: 2022-10-19 | Stop reason: ALTCHOICE

## 2020-11-27 RX ORDER — RIFAXIMIN 550 MG/1
TABLET ORAL
COMMUNITY
Start: 2020-10-09 | End: 2022-03-22 | Stop reason: ALTCHOICE

## 2020-11-27 ASSESSMENT — ENCOUNTER SYMPTOMS
EYE REDNESS: 0
NAUSEA: 0
RHINORRHEA: 0
SORE THROAT: 0
CHEST TIGHTNESS: 1
SHORTNESS OF BREATH: 0
ABDOMINAL DISTENTION: 0
ABDOMINAL PAIN: 0
VOMITING: 0
COUGH: 1
EYE PAIN: 0
DIARRHEA: 1

## 2020-11-27 NOTE — PROGRESS NOTES
[facility]  [facility]  [address]  [address] [address]  Dept: [phone]  Dept Fax: [phone]    Venita Ochoa is a 54 y.o. female who presents today for her medical conditions/complaints of   Chief Complaint   Patient presents with    Fatigue    Generalized Body Aches     symptoms started 2020    Fever    Headache    Shortness of Breath          HPI:     /82   Pulse 64   Temp 98.2 °F (36.8 °C) (Temporal)   Resp 16   Ht 5' 1\" (1.549 m)   Wt 272 lb (123.4 kg)   LMP 2002   SpO2 96%   BMI 51.39 kg/m²       HPI  Pt presented to the clinic care today with c/o fever and chills. (temp 99.0) This is a new problem. The current episode started 4 days ago. The problem has been unchanged since onset. Associated symptoms include: body aches, headache, fatigue, diarrhea, cough- dry, chest tightness. Pertinent negatives include: No loss of taste, smell, SOB,chest pain, abdominal pain . Pt has tried Motrin with little improvement. Employed at [employer]. Exposed to [employer] by co-workers.        Past Medical History:   Diagnosis Date    Back pain     RATE 10    Beta thalassemia minor     Depression     MILD    Diverticulitis     past    E-coli UTI     Fall     AT HOME 3 WEEKS AGO    Gastric ulcer     resolved    GERD (gastroesophageal reflux disease)     resolved    Hyperlipidemia     Obesity     PONV (postoperative nausea and vomiting)     REQUEST PREOP MEDS FOR N/V    Snores     Wears glasses         Past Surgical History:   Procedure Laterality Date    ANKLE SURGERY Right 10/2018    ANKLE SURGERY Right 10/24/2019    [facility] Dr Oj Fung - hardware removal    BACK SURGERY       SECTION      CHOLECYSTECTOMY      COLONOSCOPY      COLONOSCOPY N/A 2020    COLONOSCOPY WITH BIOPSY performed by Jane Bone DO at [facility]    ENDOSCOPY, COLON, DIAGNOSTIC      LUMBAR DISCECTOMY      L3-4    Constitutional: Positive for chills, fatigue and fever. HENT: Negative for congestion, ear pain, rhinorrhea and sore throat. Eyes: Negative for pain, redness and visual disturbance. Respiratory: Positive for cough and chest tightness. Negative for shortness of breath. Cardiovascular: Negative for chest pain, palpitations and leg swelling. Gastrointestinal: Positive for diarrhea. Negative for abdominal distention, abdominal pain, nausea and vomiting. Genitourinary: Negative for decreased urine volume and difficulty urinating. Musculoskeletal: Positive for arthralgias and myalgias. Negative for gait problem and neck pain. Skin: Negative for pallor and rash. Neurological: Positive for headaches. Negative for weakness and light-headedness. Psychiatric/Behavioral: Negative for sleep disturbance. Objective:     Physical Exam  Vitals signs and nursing note reviewed. Constitutional:       General: She is not in acute distress. Appearance: Normal appearance. HENT:      Head: Normocephalic and atraumatic. Right Ear: Tympanic membrane and ear canal normal.      Left Ear: Tympanic membrane and ear canal normal.      Nose: Nose normal. No rhinorrhea. Mouth/Throat:      Lips: Pink. Mouth: Mucous membranes are moist.      Pharynx: Oropharynx is clear. Uvula midline. No oropharyngeal exudate or posterior oropharyngeal erythema. Eyes:      Extraocular Movements: Extraocular movements intact. Conjunctiva/sclera: Conjunctivae normal.      Pupils: Pupils are equal, round, and reactive to light. Neck:      Musculoskeletal: Normal range of motion and neck supple. Cardiovascular:      Rate and Rhythm: Regular rhythm. Bradycardia present. Pulses: Normal pulses. Pulmonary:      Effort: Pulmonary effort is normal. No tachypnea. Breath sounds: Normal breath sounds. No wheezing, rhonchi or rales.    Abdominal:      General: Bowel sounds are normal. There is no distension. Palpations: Abdomen is soft. Tenderness: There is no abdominal tenderness. Musculoskeletal: Normal range of motion. Right lower leg: No edema. Left lower leg: No edema. Lymphadenopathy:      Cervical: No cervical adenopathy. Skin:     General: Skin is warm and dry. Capillary Refill: Capillary refill takes less than 2 seconds. Findings: No rash. Neurological:      Mental Status: She is alert and oriented to person, place, and time. Coordination: Coordination normal.      Gait: Gait normal.   Psychiatric:         Mood and Affect: Mood normal.         Thought Content: Thought content normal.           MEDICAL DECISION MAKING Assessment/Plan:     Gail Angelo was seen today for fatigue, generalized body aches, fever, headache and shortness of breath. Diagnoses and all orders for this visit:    Suspected COVID-19 virus infection  -     COVID-19 Ambulatory; Future    Flu-like symptoms  -     COVID-19 Ambulatory; Future    Exposure to COVID-19 virus  -     COVID-19 Ambulatory; Future    Chest tightness  -     COVID-19 Ambulatory; Future  -     albuterol sulfate HFA (PROVENTIL HFA) 108 (90 Base) MCG/ACT inhaler; Inhale 2 puffs into the lungs every 6 hours as needed for Shortness of Breath (chest tightness)        Results for orders placed or performed during the hospital encounter of 09/25/20   Covid-19 Ambulatory   Result Value Ref Range    SARS-CoV-2, BETHANY Not Detected Not Detected     Based on the history and exam,   Will send out COVID19 testing. Possible treatment alterations based on the results. Patient instructed to self-quarantine until testing results are back- and to follow the quarantine instructions in the after visit summary. Even if results are negative- pt informed still needs to isolate for at least 10 days. Tylenol as needed for fever/pain.   Increase fluids, rest.   The patient indicates understanding of these issues and agrees with the plan.  Educational materials provided on AVS.  Follow up if symptoms do not improve/worsen. Call with any questions or concerns. Discussed symptoms that will warrant urgent ED evaluation/treatment. Preventing the Spread of Coronavirus Disease 2019 in Homes and Residential Communities: For the most recent information go to: [email or web address]    [address] with COVID-19 may have no symptoms, mild symptoms, such as fever, cough, and shortness of breath or they may have more severe illness, developing severe and fatal pneumonia. As a result, Advance Care Planning with attention to naming a health care decision maker (someone you trust to make healthcare decisions for you if you could not speak for yourself) and sharing other health care preferences is important BEFORE a possible health crisis. Please contact your Primary Care Provider to discuss Advance Care Planning. Patient given educational materials - see patientinstructions. Discussed use, benefit, and side effects of prescribed medications. All patient questions answered. Pt verbalized understanding. Instructed to continue current medications, diet and exercise. Patient agreed with treatment plan. Follow up as directed.      Electronically signed by ANA LAURA Waite CNP on 11/27/2020 at 12:28 PM

## 2020-11-27 NOTE — LETTER
[address]  [facility] [address]  Phone: [phone]  Fax: [address] [phone], APRN - CNP        November 27, 2020     Patient: Fan Santana   YOB: 1965   Date of Visit: 11/27/2020       To Whom it May Concern:    Louann Le was seen in my clinic on 11/27/2020. She may return to work on when test results are back and symptoms improved. .    If you have any questions or concerns, please don't hesitate to call.     Sincerely,         ANA LAURA Bruno - CNP

## 2020-11-27 NOTE — PATIENT INSTRUCTIONS
trouble breathing.     · You passed out (lost consciousness). Call your doctor now or seek immediate medical care if:    · You seem to be getting much sicker.     · You have a new or higher fever.     · You have blood in your stools.     · You have new belly pain, or your pain gets worse.     · You have a new rash. Watch closely for changes in your health, and be sure to contact your doctor if:    · You start to get better and then get worse.     · You do not get better as expected. Where can you learn more? Go to [email or web address] and sign in to your [employer] account. Enter W117 in the [employer] box to learn more about \"Viral Infections: Care Instructions. \"     If you do not have an account, please click on the \"Sign Up Now\" link. Current as of: February 11, 2020               Content Version: 12.6  © 0183-7144 [employer]. Care instructions adapted under license by [facility] ([facility]). If you have questions about a medical condition or this instruction, always ask your healthcare professional. [address] any warranty or liability for your use of this information. Learning About Coronavirus [phone])  Coronavirus [phone]): Overview  What is coronavirus (COVID-19)? The coronavirus disease (COVID-19) is caused by a virus. It is an illness that was first found in [place], [place], in December 2019. It has since spread worldwide. The virus can cause fever, cough, and trouble breathing. In severe cases, it can cause pneumonia and make it hard to breathe without help. It can cause death. Coronaviruses are a large group of viruses. They cause the common cold. They also cause more serious illnesses like Middle East respiratory syndrome (MERS) and severe acute respiratory syndrome (SARS). COVID-19 is caused by a novel coronavirus. That means it's a new type that has not been seen in people before.   This virus spreads person-to-person through droplets from coughing and sneezing. It can also spread when you are close to someone who is infected. And it can spread when you touch something that has the virus on it, such as a doorknob or a tabletop. What can you do to protect yourself from coronavirus (COVID-19)? The best way to protect yourself from getting sick is to:  · Avoid areas where there is an outbreak. · Avoid contact with people who may be infected. · Wash your hands often with soap or alcohol-based hand sanitizers. · Avoid crowds and try to stay at least 6 feet away from other people. · Wash your hands often, especially after you cough or sneeze. Use soap and water, and scrub for at least 20 seconds. If soap and water aren't available, use an alcohol-based hand . · Avoid touching your mouth, nose, and eyes. What can you do to avoid spreading the virus to others? To help avoid spreading the virus to others:  · Cover your mouth with a tissue when you cough or sneeze. Then throw the tissue in the trash. · Use a disinfectant to clean things that you touch often. · Wear a cloth face cover if you have to go to public areas. · Stay home if you are sick or have been exposed to the virus. Don't go to school, work, or public areas. And don't use public transportation. · If you are sick:  ? Leave your home only if you need to get medical care. But call the doctor's office first so they know you're coming. And wear a face cover. ? Wear the face cover whenever you're around other people. It can help stop the spread of the virus when you cough or sneeze. ? Clean and disinfect your home every day. Use household  and disinfectant wipes or sprays. Take special care to clean things that you grab with your hands. These include doorknobs, remote controls, phones, and handles on your refrigerator and microwave. And don't forget countertops, tabletops, bathrooms, and computer keyboards.   When to call for help  [id number] anytime you think you may need emergency care. For example, call if:  · You have severe trouble breathing. (You can't talk at all.)  · You have constant chest pain or pressure. · You are severely dizzy or lightheaded. · You are confused or can't think clearly. · Your face and lips have a blue color. · You pass out (lose consciousness) or are very hard to wake up. Call your doctor now if you develop symptoms such as:  · Shortness of breath. · Fever. · Cough. If you need to get care, call ahead to the doctor's office for instructions before you go. Make sure you wear a face cover to prevent exposing other people to the virus. Where can you get the latest information? The following health organizations are tracking and studying this virus. Their websites contain the most up-to-date information. Efren Briggs also learn what to do if you think you may have been exposed to the virus. · U.S. Centers for Disease Control and Prevention (CDC): The CDC provides updated news about the disease and travel advice. The website also tells you how to prevent the spread of infection. www.cdc.gov  · World Health Organization [facility]): WHO offers information about the virus outbreaks. WHO also has travel advice. www.who.int  Current as of: April 24, 2020               Content Version: 12.4  © 2006-2020 Healthwise[employer] Incorporated. Care instructions adapted under license by your healthcare professional. If you have questions about a medical condition or this instruction, always ask your healthcare professional. [place] [address] any warranty or liability for your use of this information. Coronavirus ([id number]): Care Instructions  Overview  The coronavirus disease (COVID-19) is caused by a virus. It causes a fever, a cough, and shortness of breath. It mainly spreads person-to-person through droplets from coughing and sneezing. The virus also can spread when people are in close contact with someone who is infected.   Most people have mild symptoms and can take care of themselves at home. If their symptoms get worse, they may need care in a hospital. There is no medicine to fight the virus. It's important to not spread the virus to others. If you have COVID-19, wear a face cover anytime you are around other people. You need to isolate yourself while you are sick. Your doctor will tell you when you no longer need to be isolated. Leave your home only if you need to get medical care. Follow-up care is a key part of your treatment and safety. Be sure to make and go to all appointments, and call your doctor if you are having problems. It's also a good idea to know your test results and keep a list of the medicines you take. How can you care for yourself at home? · Get extra rest. It can help you feel better. · Drink plenty of fluids. This helps replace fluids lost from fever. Fluids also help ease a scratchy throat. Water, soup, fruit juice, and hot tea with lemon are good choices. · Take acetaminophen (such as Tylenol) to reduce a fever. It may also help with muscle aches. Read and follow all instructions on the label. · Sponge your body with lukewarm water to help with fever. Don't use cold water or ice. · Use petroleum jelly on sore skin. This can help if the skin around your nose and lips becomes sore from rubbing a lot with tissues. Tips for isolation  · Wear a cloth face cover when you are around other people. It can help stop the spread of the virus when you cough or sneeze. · Limit contact with people in your home. If possible, stay in a separate bedroom and use a separate bathroom. · Avoid contact with pets and other animals. · Cover your mouth and nose with a tissue when you cough or sneeze. Then throw it in the trash right away. · Wash your hands often, especially after you cough or sneeze. Use soap and water, and scrub for at least 20 seconds.  If soap and water aren't available, use an alcohol-based hand . · Don't share personal household items. These include bedding, towels, cups and glasses, and eating utensils. · Clean and disinfect your home every day. Use household  and disinfectant wipes or sprays. Take special care to clean things that you grab with your hands. These include doorknobs, remote controls, phones, and handles on your refrigerator and microwave. And don't forget countertops, tabletops, bathrooms, and computer keyboards. When should you call for help? [id number] anytime you think you may need emergency care. For example, call if you have life-threatening symptoms, such as:  · You have severe trouble breathing. (You can't talk at all.)  · You have constant chest pain or pressure. · You are severely dizzy or lightheaded. · You are confused or can't think clearly. · Your face and lips have a blue color. · You pass out (lose consciousness) or are very hard to wake up. Call your doctor now or seek immediate medical care if:  · You have moderate trouble breathing. (You can't speak a full sentence.)  · You are coughing up blood (more than about 1 teaspoon). · You have signs of low blood pressure. These include feeling lightheaded; being too weak to stand; and having cold, pale, clammy skin. Watch closely for changes in your health, and be sure to contact your doctor if:  · Your symptoms get worse. · You are not getting better as expected. Call before you go to the doctor's office. Follow their instructions. And wear a cloth face cover. Current as of: April 24, 2020               Content Version: 12.4  © 2855-7338 Healthwise, Incorporated. Care instructions adapted under license by your healthcare professional. If you have questions about a medical condition or this instruction, always ask your healthcare professional. [address] any warranty or liability for your use of this information.

## 2020-11-30 ENCOUNTER — TELEPHONE (OUTPATIENT)
Dept: PRIMARY CARE CLINIC | Age: 55
End: 2020-11-30

## 2020-11-30 LAB — SARS-COV-2, NAA: NOT DETECTED

## 2020-12-01 ENCOUNTER — OFFICE VISIT (OUTPATIENT)
Dept: FAMILY MEDICINE CLINIC | Age: 55
End: 2020-12-01
Payer: COMMERCIAL

## 2020-12-01 VITALS
HEIGHT: 61 IN | DIASTOLIC BLOOD PRESSURE: 70 MMHG | TEMPERATURE: 97 F | BODY MASS INDEX: 51.92 KG/M2 | WEIGHT: 275 LBS | HEART RATE: 65 BPM | SYSTOLIC BLOOD PRESSURE: 130 MMHG | OXYGEN SATURATION: 98 %

## 2020-12-01 PROCEDURE — G8482 FLU IMMUNIZE ORDER/ADMIN: HCPCS | Performed by: STUDENT IN AN ORGANIZED HEALTH CARE EDUCATION/TRAINING PROGRAM

## 2020-12-01 PROCEDURE — G8427 DOCREV CUR MEDS BY ELIG CLIN: HCPCS | Performed by: STUDENT IN AN ORGANIZED HEALTH CARE EDUCATION/TRAINING PROGRAM

## 2020-12-01 PROCEDURE — G8417 CALC BMI ABV UP PARAM F/U: HCPCS | Performed by: STUDENT IN AN ORGANIZED HEALTH CARE EDUCATION/TRAINING PROGRAM

## 2020-12-01 PROCEDURE — 1036F TOBACCO NON-USER: CPT | Performed by: STUDENT IN AN ORGANIZED HEALTH CARE EDUCATION/TRAINING PROGRAM

## 2020-12-01 PROCEDURE — 3017F COLORECTAL CA SCREEN DOC REV: CPT | Performed by: STUDENT IN AN ORGANIZED HEALTH CARE EDUCATION/TRAINING PROGRAM

## 2020-12-01 PROCEDURE — 99213 OFFICE O/P EST LOW 20 MIN: CPT | Performed by: STUDENT IN AN ORGANIZED HEALTH CARE EDUCATION/TRAINING PROGRAM

## 2020-12-01 PROCEDURE — G9899 SCRN MAM PERF RSLTS DOC: HCPCS | Performed by: STUDENT IN AN ORGANIZED HEALTH CARE EDUCATION/TRAINING PROGRAM

## 2020-12-01 RX ORDER — CIPROFLOXACIN AND DEXAMETHASONE 3; 1 MG/ML; MG/ML
4 SUSPENSION/ DROPS AURICULAR (OTIC) 2 TIMES DAILY
Qty: 1 BOTTLE | Refills: 2 | Status: SHIPPED | OUTPATIENT
Start: 2020-12-01 | End: 2020-12-08

## 2020-12-01 ASSESSMENT — ENCOUNTER SYMPTOMS
COUGH: 0
CONSTIPATION: 0
DIARRHEA: 0
SORE THROAT: 0
BACK PAIN: 0
ABDOMINAL DISTENTION: 0
WHEEZING: 0
ABDOMINAL PAIN: 0
CHEST TIGHTNESS: 0
SHORTNESS OF BREATH: 0

## 2020-12-01 NOTE — PROGRESS NOTES
@[facility]@      12/1/2020      Pat Bain is a 54 y.o. female here for the following evaluation regarding the following medical concerns:    HPI: 80-year-old female with known TMJ has on oral appliance presents with left ear pain    Also feeling rundown has some nasal congestion no cough no fever does feel cold likely has a viral infection that is causing all her symptoms, however I feel that the otalgia is related to her TMJ. She has ibuprofen 800 mg at home she will give this a try she is not open to trying Flexeril at this time although I think that would also help    Recently Covid tested on Friday was negative    12 point ROS otherwise negative      Review of Systems   Constitutional: Negative for chills, fatigue and fever. HENT: Negative for congestion, postnasal drip and sore throat. Eyes: Negative for visual disturbance. Respiratory: Negative for cough, chest tightness, shortness of breath and wheezing. Cardiovascular: Negative. Gastrointestinal: Negative for abdominal distention, abdominal pain, constipation and diarrhea. Genitourinary: Negative for difficulty urinating, dysuria, frequency and urgency. Musculoskeletal: Negative for arthralgias, back pain and joint swelling. Skin: Negative for rash. Neurological: Negative for dizziness, weakness and light-headedness. Psychiatric/Behavioral: Negative for agitation, decreased concentration and sleep disturbance. Physical Exam  Vitals signs and nursing note reviewed. Constitutional:       Appearance: Normal appearance. HENT:      Head: Normocephalic and atraumatic. Right Ear: Tympanic membrane, ear canal and external ear normal.      Left Ear: Tympanic membrane, ear canal and external ear normal.   Eyes:      Extraocular Movements: Extraocular movements intact. Conjunctiva/sclera: Conjunctivae normal.      Pupils: Pupils are equal, round, and reactive to light.    Neck:      Musculoskeletal: Normal range of motion and neck supple. Pulmonary:      Effort: Pulmonary effort is normal.   Musculoskeletal: Normal range of motion. Skin:     General: Skin is warm. Neurological:      General: No focal deficit present. Mental Status: She is alert and oriented to person, place, and time. Prior to Visit Medications    Medication Sig Taking? Authorizing Provider   albuterol sulfate HFA (PROVENTIL HFA) 108 (90 Base) MCG/ACT inhaler Inhale 2 puffs into the lungs every 6 hours as needed for Shortness of Breath (chest tightness) Yes ANA LAURA Wyatt CNP   dicyclomine (BENTYL) 20 MG tablet  Yes Historical Provider, MD   ibuprofen (ADVIL;MOTRIN) 800 MG tablet Take 1 tablet by mouth 2 times daily as needed for Pain Yes Santhosh Casiano MD   vitamin D3 (CHOLECALCIFEROL) 25 MCG (1000 UT) TABS tablet Take 2 tablets by mouth daily Yes Santhosh Casiano MD   citalopram (CELEXA) 10 MG tablet TAKE ONE TABLET BY MOUTH DAILY Yes Bipin Dawkins MD   Cholecalciferol (VITAMIN D) 10 MCG/ML LIQD  Yes Historical Provider, MD   atorvastatin (LIPITOR) 20 MG tablet Take 1 tablet by mouth daily Yes ANA LAURA Luis CNP   calcium carbonate (OSCAL) 500 MG TABS tablet Take 500 mg by mouth daily Yes Historical Provider, MD   Ascorbic Acid (VITAMIN C) 250 MG tablet Take 250 mg by mouth daily Yes Historical Provider, MD   XIFAXAN 550 MG tablet   Historical Provider, MD        Social History     Tobacco Use    Smoking status: Former Smoker     Packs/day: 0.50     Years: 6.00     Pack years: 3.00     Types: Cigarettes     Last attempt to quit: 2011     Years since quittin.3    Smokeless tobacco: Never Used   Substance Use Topics    Alcohol use: Yes     Comment: rarely uses this       Body mass index is 51.96 kg/m².     Vitals:    20 1503   BP: 130/70   Site: Right Lower Arm   Position: Sitting   Cuff Size: Large Adult   Pulse: 65   Temp: 97 °F (36.1 °C)   SpO2: 98%   Weight: 275 lb (124.7 kg) Height: 5' 1\" (1.549 m)        Ifeanyi Paz was seen today for otalgia, headache, sweats and fatigue.     Diagnoses and all orders for this visit:    Otalgia of left ear    TMJ (dislocation of temporomandibular joint), sequela      Otitis externa unlikely  I think this is related to TMJ  She is also dealing with a concurrent viral illness which is likely causing all of her symptoms as well  Would try ibuprofen 800 mg  Flexeril is another option but she does not want to try this at this time  I have sent Ciprodex drops as well she can use those in the left ear  Again I think this is related to the TMJ      Carmella Grant MD

## 2020-12-14 RX ORDER — IBUPROFEN 800 MG/1
TABLET ORAL
Qty: 60 TABLET | Refills: 0 | Status: SHIPPED | OUTPATIENT
Start: 2020-12-14 | End: 2021-01-11

## 2021-01-11 DIAGNOSIS — M54.40 ACUTE RIGHT-SIDED LOW BACK PAIN WITH SCIATICA, SCIATICA LATERALITY UNSPECIFIED: ICD-10-CM

## 2021-01-11 RX ORDER — IBUPROFEN 800 MG/1
TABLET ORAL
Qty: 60 TABLET | Refills: 0 | Status: SHIPPED | OUTPATIENT
Start: 2021-01-11 | End: 2021-02-08

## 2021-01-11 NOTE — TELEPHONE ENCOUNTER
Last visit: 11/17/20  Last Med refill: 12/14/20      Next Visit Date:  No future appointments.     Health Maintenance   Topic Date Due    HIV screen  07/07/2021 (Originally 9/15/1980)    A1C test (Diabetic or Prediabetic)  02/25/2021    Lipid screen  02/25/2021    Breast cancer screen  05/24/2021    DTaP/Tdap/Td vaccine (3 - Td) 11/03/2027    Colon cancer screen colonoscopy  09/29/2030    Flu vaccine  Completed    Shingles Vaccine  Completed    Hepatitis C screen  Completed    Hepatitis A vaccine  Aged Out    Hepatitis B vaccine  Aged Out    Hib vaccine  Aged Out    Meningococcal (ACWY) vaccine  Aged Out    Pneumococcal 0-64 years Vaccine  Aged Out       Hemoglobin A1C (%)   Date Value   02/25/2020 6.1 (H)   11/03/2017 5.5   03/22/2017 5.6             ( goal A1C is < 7)   No results found for: LABMICR  LDL Cholesterol (mg/dL)   Date Value   02/25/2020 73   09/26/2018 71     LDL Calculated (mg/dL)   Date Value   08/06/2014 111   10/16/2013 120       (goal LDL is <100)   AST (U/L)   Date Value   02/25/2020 25     ALT (U/L)   Date Value   02/25/2020 40 (H)     BUN (mg/dL)   Date Value   02/25/2020 17     BP Readings from Last 3 Encounters:   12/01/20 130/70   11/27/20 136/82   11/17/20 116/68          (goal 120/80)    All Future Testing planned in [facility]  Lab Frequency Next Occurrence   Hepatic Function Panel Once 04/03/2020   Urinalysis Reflex to Culture Once 03/05/2020   XR LUMBAR SPINE (MIN 4 VIEWS) Once 11/17/2020               Patient Active Problem List:     Hyperlipidemia     Thalassemia minor     Varicose veins     Lumbago     Vitamin D deficiency     Morbid obesity ([address]     Acquired trigger finger of left middle finger     Closed fracture of lateral malleolus     Fatigue     Fever     Fracture with nonunion     History of unsteady gait     Major depression, single episode     Osteopenia     Pain due to internal prosthetic device     Pain in joint involving ankle and foot     Gastroesophageal reflux disease     Hypercholesterolemia     Pure hypercholesterolemia     Low back pain     Thalassemia

## 2021-02-08 DIAGNOSIS — M54.40 ACUTE RIGHT-SIDED LOW BACK PAIN WITH SCIATICA, SCIATICA LATERALITY UNSPECIFIED: ICD-10-CM

## 2021-02-08 RX ORDER — IBUPROFEN 800 MG/1
TABLET ORAL
Qty: 60 TABLET | Refills: 0 | Status: SHIPPED | OUTPATIENT
Start: 2021-02-08 | End: 2021-03-08

## 2021-02-08 NOTE — TELEPHONE ENCOUNTER
Last visit: 11/17/20  Last Med refill: 01/11/21      Next Visit Date:  No future appointments.     Health Maintenance   Topic Date Due    HIV screen  07/07/2021 (Originally 9[birth year]15/1980)    A1C test (Diabetic or Prediabetic)  02/25/2021    Lipid screen  02/25/2021    Breast cancer screen  05/24/2021    DTaP/Tdap/Td vaccine (3 - Td) 11/03/2027    Colon cancer screen colonoscopy  09/29/2030    Flu vaccine  Completed    Shingles Vaccine  Completed    Hepatitis C screen  Completed    Hepatitis A vaccine  Aged Out    Hepatitis B vaccine  Aged Out    Hib vaccine  Aged Out    Meningococcal (ACWY) vaccine  Aged Out    Pneumococcal 0-64 years Vaccine  Aged Out       Hemoglobin A1C (%)   Date Value   02/25/2020 6.1 (H)   11/03/2017 5.5   03/22/2017 5.6             ( goal A1C is < 7)   No results found for: LABMICR  LDL Cholesterol (mg/dL)   Date Value   02/25/2020 73   09/26/2018 71     LDL Calculated (mg/dL)   Date Value   08/06/2014 111   10/16/2013 120       (goal LDL is <100)   AST (U/L)   Date Value   02/25/2020 25     ALT (U/L)   Date Value   02/25/2020 40 (H)     BUN (mg/dL)   Date Value   02/25/2020 17     BP Readings from Last 3 Encounters:   12/01/20 130/70   11/27/20 136/82   11/17/20 116/68          (goal 120/80)    All Future Testing planned in [facility]  Lab Frequency Next Occurrence   Hepatic Function Panel Once 04/03/2020   Urinalysis Reflex to Culture Once 03/05/2020   XR LUMBAR SPINE (MIN 4 VIEWS) Once 11/17/2020               Patient Active Problem List:     Hyperlipidemia     Thalassemia minor     Varicose veins     Lumbago     Vitamin D deficiency     Morbid obesity ([address]     Acquired trigger finger of left middle finger     Closed fracture of lateral malleolus     Fatigue     Fever     Fracture with nonunion     History of unsteady gait     Major depression, single episode     Osteopenia     Pain due to internal prosthetic device     Pain in joint involving ankle and foot     Gastroesophageal reflux disease     Hypercholesterolemia     Pure hypercholesterolemia     Low back pain     Thalassemia

## 2021-02-19 DIAGNOSIS — E78.2 MIXED HYPERLIPIDEMIA: ICD-10-CM

## 2021-02-19 RX ORDER — ATORVASTATIN CALCIUM 20 MG/1
20 TABLET, FILM COATED ORAL DAILY
Qty: 30 TABLET | Refills: 11 | Status: SHIPPED | OUTPATIENT
Start: 2021-02-19 | End: 2022-03-14

## 2021-02-19 NOTE — TELEPHONE ENCOUNTER
Lov: 11/17/20  Lrf: 2/27/20  Na: 0  Health Maintenance   Topic Date Due    COVID-19 Vaccine (1 of 2) 09/15/1981    A1C test (Diabetic or Prediabetic)  02/25/2021    Lipid screen  02/25/2021    HIV screen  07/07/2021 (Originally 9[birth year]15[birth year]1980)    Breast cancer screen  05/24/2021    DTaP/Tdap/Td vaccine (3 - Td) 11/03/2027    Colon cancer screen colonoscopy  09/29/2030    Flu vaccine  Completed    Shingles Vaccine  Completed    Hepatitis C screen  Completed    Hepatitis A vaccine  Aged Out    Hepatitis B vaccine  Aged Out    Hib vaccine  Aged Out    Meningococcal (ACWY) vaccine  Aged Out    Pneumococcal 0-64 years Vaccine  Aged Out             (applicable per patient's age: Cancer Screenings, Depression Screening, Fall Risk Screening, Immunizations)    Hemoglobin A1C (%)   Date Value   02/25/2020 6.1 (H)   11/03/2017 5.5   03/22/2017 5.6     LDL Cholesterol (mg/dL)   Date Value   02/25/2020 73     LDL Calculated (mg/dL)   Date Value   08/06/2014 111     AST (U/L)   Date Value   02/25/2020 25     ALT (U/L)   Date Value   02/25/2020 40 (H)     BUN (mg/dL)   Date Value   02/25/2020 17      (goal A1C is < 7)   (goal LDL is <100) need 30-50% reduction from baseline     BP Readings from Last 3 Encounters:   12/01/20 130/70   11/27/20 136/82   11/17/20 116/68    (goal /80)      All Future Testing planned in [facility]:  Lab Frequency Next Occurrence   Hepatic Function Panel Once 04/03/2020   Urinalysis Reflex to Culture Once 03/05/2020   XR LUMBAR SPINE (MIN 4 VIEWS) Once 11/17/2020       Next Visit Date:  No future appointments.          Patient Active Problem List:     Hyperlipidemia     Thalassemia minor     Varicose veins     Lumbago     Vitamin D deficiency     Morbid obesity ([address]     Acquired trigger finger of left middle finger     Closed fracture of lateral malleolus     Fatigue     Fever     Fracture with nonunion     History of unsteady gait     Major depression, single episode     Osteopenia Pain due to internal prosthetic device     Pain in joint involving ankle and foot     Gastroesophageal reflux disease     Hypercholesterolemia     Pure hypercholesterolemia     Low back pain     Thalassemia

## 2021-03-08 DIAGNOSIS — M54.40 ACUTE RIGHT-SIDED LOW BACK PAIN WITH SCIATICA, SCIATICA LATERALITY UNSPECIFIED: ICD-10-CM

## 2021-03-08 RX ORDER — IBUPROFEN 800 MG/1
TABLET ORAL
Qty: 180 TABLET | Refills: 1 | Status: SHIPPED | OUTPATIENT
Start: 2021-03-08 | End: 2021-03-17

## 2021-03-08 NOTE — TELEPHONE ENCOUNTER
Last visit: 11/17/20  Last Med refill: 2/8/21    Next Visit Date:  No future appointments.     Health Maintenance   Topic Date Due    COVID-19 Vaccine (1 of 2) Never done    A1C test (Diabetic or Prediabetic)  02/25/2021    Lipid screen  02/25/2021    HIV screen  07/07/2021 (Originally 9/15/1980)    Breast cancer screen  05/24/2021    DTaP/Tdap/Td vaccine (3 - Td) 11/03/2027    Colon cancer screen colonoscopy  09/29/2030    Flu vaccine  Completed    Shingles Vaccine  Completed    Hepatitis C screen  Completed    Hepatitis A vaccine  Aged Out    Hepatitis B vaccine  Aged Out    Hib vaccine  Aged Out    Meningococcal (ACWY) vaccine  Aged Out    Pneumococcal 0-64 years Vaccine  Aged Out       Hemoglobin A1C (%)   Date Value   02/25/2020 6.1 (H)   11/03/2017 5.5   03/22/2017 5.6             ( goal A1C is < 7)   No results found for: LABMICR  LDL Cholesterol (mg/dL)   Date Value   02/25/2020 73   09/26/2018 71     LDL Calculated (mg/dL)   Date Value   08/06/2014 111   10/16/2013 120       (goal LDL is <100)   AST (U/L)   Date Value   02/25/2020 25     ALT (U/L)   Date Value   02/25/2020 40 (H)     BUN (mg/dL)   Date Value   02/25/2020 17     BP Readings from Last 3 Encounters:   12/01/20 130/70   11/27/20 136/82   11/17/20 116/68          (goal 120/80)    All Future Testing planned in [facility]  Lab Frequency Next Occurrence   XR LUMBAR SPINE (MIN 4 VIEWS) Once 11/17/2021               Patient Active Problem List:     Hyperlipidemia     Thalassemia minor     Varicose veins     Lumbago     Vitamin D deficiency     Morbid obesity ([address]     Acquired trigger finger of left middle finger     Closed fracture of lateral malleolus     Fatigue     Fever     Fracture with nonunion     History of unsteady gait     Major depression, single episode     Osteopenia     Pain due to internal prosthetic device     Pain in joint involving ankle and foot     Gastroesophageal reflux disease     Hypercholesterolemia     Pure hypercholesterolemia     Low back pain     Thalassemia

## 2021-03-17 DIAGNOSIS — F33.0 MILD EPISODE OF RECURRENT MAJOR DEPRESSIVE DISORDER (HCC): ICD-10-CM

## 2021-03-17 RX ORDER — CITALOPRAM 10 MG/1
TABLET ORAL
Qty: 30 TABLET | Refills: 5 | Status: SHIPPED | OUTPATIENT
Start: 2021-03-17 | End: 2021-09-15

## 2021-03-17 NOTE — TELEPHONE ENCOUNTER
Last visit: 11/17/20  Last Med refill: 9/21/20    Next Visit Date:  No future appointments.     Health Maintenance   Topic Date Due    COVID-19 Vaccine (1) Never done    A1C test (Diabetic or Prediabetic)  02/25/2021    Lipid screen  02/25/2021    HIV screen  07/07/2021 (Originally 9[birth year]15/1980)    Breast cancer screen  05/24/2021    DTaP/Tdap/Td vaccine (3 - Td) 11/03/2027    Colon cancer screen colonoscopy  09/29/2030    Flu vaccine  Completed    Shingles Vaccine  Completed    Hepatitis C screen  Completed    Hepatitis A vaccine  Aged Out    Hepatitis B vaccine  Aged Out    Hib vaccine  Aged Out    Meningococcal (ACWY) vaccine  Aged Out    Pneumococcal 0-64 years Vaccine  Aged Out       Hemoglobin A1C (%)   Date Value   02/25/2020 6.1 (H)   11/03/2017 5.5   03/22/2017 5.6             ( goal A1C is < 7)   No results found for: LABMICR  LDL Cholesterol (mg/dL)   Date Value   02/25/2020 73   09/26/2018 71     LDL Calculated (mg/dL)   Date Value   08/06/2014 111   10/16/2013 120       (goal LDL is <100)   AST (U/L)   Date Value   02/25/2020 25     ALT (U/L)   Date Value   02/25/2020 40 (H)     BUN (mg/dL)   Date Value   02/25/2020 17     BP Readings from Last 3 Encounters:   12/01/20 130/70   11/27/20 136/82   11/17/20 116/68          (goal 120/80)    All Future Testing planned in [facility]  Lab Frequency Next Occurrence   XR LUMBAR SPINE (MIN 4 VIEWS) Once 11/17/2021               Patient Active Problem List:     Hyperlipidemia     Thalassemia minor     Varicose veins     Lumbago     Vitamin D deficiency     Morbid obesity ([address]     Acquired trigger finger of left middle finger     Closed fracture of lateral malleolus     Fatigue     Fever     Fracture with nonunion     History of unsteady gait     Major depression, single episode     Osteopenia     Pain due to internal prosthetic device     Pain in joint involving ankle and foot     Gastroesophageal reflux disease     Hypercholesterolemia     Pure hypercholesterolemia     Low back pain     Thalassemia

## 2021-07-06 ENCOUNTER — TELEPHONE (OUTPATIENT)
Dept: FAMILY MEDICINE CLINIC | Age: 56
End: 2021-07-06

## 2021-07-06 DIAGNOSIS — M25.511 ACUTE PAIN OF RIGHT SHOULDER: Primary | ICD-10-CM

## 2021-07-08 NOTE — TELEPHONE ENCOUNTER
Pt states that [facility]- pended order for your approval. Thank you

## 2021-09-15 DIAGNOSIS — E55.9 VITAMIN D DEFICIENCY: ICD-10-CM

## 2021-09-15 DIAGNOSIS — F33.0 MILD EPISODE OF RECURRENT MAJOR DEPRESSIVE DISORDER (HCC): ICD-10-CM

## 2021-09-16 NOTE — TELEPHONE ENCOUNTER
Last visit: 11-17-20  Last Med refill: 3-17-21 celexa, 11-17-20 vit d  Does patient have enough medication for 72 hours: No:     Next Visit Date:  No future appointments.     Health Maintenance   Topic Date Due    COVID-19 Vaccine (1) Never done    HIV screen  Never done    A1C test (Diabetic or Prediabetic)  02/25/2021    Lipid screen  02/25/2021    Flu vaccine (1) 09/01/2021    Breast cancer screen  06/21/2023    DTaP/Tdap/Td vaccine (3 - Td or Tdap) 11/03/2027    Colon cancer screen colonoscopy  09/29/2030    Shingles Vaccine  Completed    Hepatitis C screen  Completed    Hepatitis A vaccine  Aged Out    Hepatitis B vaccine  Aged Out    Hib vaccine  Aged Out    Meningococcal (ACWY) vaccine  Aged Out    Pneumococcal 0-64 years Vaccine  Aged Out       Hemoglobin A1C (%)   Date Value   02/25/2020 6.1 (H)   11/03/2017 5.5   03/22/2017 5.6             ( goal A1C is < 7)   No results found for: LABMICR  LDL Cholesterol (mg/dL)   Date Value   02/25/2020 73   09/26/2018 71     LDL Calculated (mg/dL)   Date Value   08/06/2014 111   10/16/2013 120       (goal LDL is <100)   AST (U/L)   Date Value   02/25/2020 25     ALT (U/L)   Date Value   02/25/2020 40 (H)     BUN (mg/dL)   Date Value   02/25/2020 17     BP Readings from Last 3 Encounters:   12/01/20 130/70   11/27/20 136/82   11/17/20 116/68          (goal 120/80)    All Future Testing planned in [facility]  Lab Frequency Next Occurrence   XR LUMBAR SPINE (MIN 4 VIEWS) Once 11/17/2021               Patient Active Problem List:     Hyperlipidemia     Thalassemia minor     Varicose veins     Lumbago     Vitamin D deficiency     Morbid obesity ([address]     Acquired trigger finger of left middle finger     Closed fracture of lateral malleolus     Fatigue     Fever     Fracture with nonunion     History of unsteady gait     Major depression, single episode     Osteopenia     Pain due to internal prosthetic device     Pain in joint involving ankle and foot Gastroesophageal reflux disease     Hypercholesterolemia     Pure hypercholesterolemia     Low back pain     Thalassemia

## 2021-09-17 RX ORDER — CITALOPRAM 10 MG/1
10 TABLET ORAL DAILY
Qty: 30 TABLET | Refills: 5 | Status: SHIPPED | OUTPATIENT
Start: 2021-09-17 | End: 2022-03-22 | Stop reason: SDUPTHER

## 2021-09-17 RX ORDER — MELATONIN
1000 DAILY
Qty: 90 TABLET | Refills: 1 | Status: SHIPPED | OUTPATIENT
Start: 2021-09-17 | End: 2022-09-17

## 2021-10-26 ENCOUNTER — OFFICE VISIT (OUTPATIENT)
Dept: PRIMARY CARE CLINIC | Age: 56
End: 2021-10-26
Payer: COMMERCIAL

## 2021-10-26 VITALS
SYSTOLIC BLOOD PRESSURE: 140 MMHG | BODY MASS INDEX: 51.96 KG/M2 | TEMPERATURE: 99.4 F | DIASTOLIC BLOOD PRESSURE: 90 MMHG | HEART RATE: 79 BPM | WEIGHT: 275 LBS | OXYGEN SATURATION: 97 %

## 2021-10-26 DIAGNOSIS — R05.9 COUGH: ICD-10-CM

## 2021-10-26 DIAGNOSIS — M79.10 MYALGIA: ICD-10-CM

## 2021-10-26 DIAGNOSIS — J02.9 PHARYNGITIS, UNSPECIFIED ETIOLOGY: Primary | ICD-10-CM

## 2021-10-26 DIAGNOSIS — R50.9 FEVER, UNSPECIFIED FEVER CAUSE: ICD-10-CM

## 2021-10-26 LAB — S PYO AG THROAT QL: NORMAL

## 2021-10-26 PROCEDURE — G8484 FLU IMMUNIZE NO ADMIN: HCPCS | Performed by: FAMILY MEDICINE

## 2021-10-26 PROCEDURE — 1036F TOBACCO NON-USER: CPT | Performed by: FAMILY MEDICINE

## 2021-10-26 PROCEDURE — 3017F COLORECTAL CA SCREEN DOC REV: CPT | Performed by: FAMILY MEDICINE

## 2021-10-26 PROCEDURE — 87880 STREP A ASSAY W/OPTIC: CPT | Performed by: FAMILY MEDICINE

## 2021-10-26 PROCEDURE — G8417 CALC BMI ABV UP PARAM F/U: HCPCS | Performed by: FAMILY MEDICINE

## 2021-10-26 PROCEDURE — 99214 OFFICE O/P EST MOD 30 MIN: CPT | Performed by: FAMILY MEDICINE

## 2021-10-26 PROCEDURE — G8427 DOCREV CUR MEDS BY ELIG CLIN: HCPCS | Performed by: FAMILY MEDICINE

## 2021-10-26 NOTE — LETTER
[address]   [address] [place] [address]  Phone: [phone]  Fax: [phone]    Gavi Salguero MD        October 26, 2021     Patient: Zoraida Olmos   YOB: 1965   Date of Visit: 10/26/2021       To Whom it May Concern:    Brody Butts was seen in my clinic on 10/26/2021. She may return to work on 10/28/21. If you have any questions or concerns, please don't hesitate to call.     Sincerely,         Gavi Salguero MD

## 2021-10-26 NOTE — PROGRESS NOTES
Subjective:  Christine Bhatti presents for   Chief Complaint   Patient presents with    Fatigue     onset thursday; awaiting covid results from [facility] (yesterday)    Fever    Generalized Body Aches    Nasal Congestion    Pharyngitis    Cough     Covid test is pending  Place of employment/school: retail  Exposure history to COVID-19: no  Has patient been vaccinated? Which product and when? Yes. J&J  Length of symptoms? 5+ days    SOB: no  Dry Cough: yes    Nasal congestion/rhinorrhea: yes  Sinus pressure: slight  Sore throat: yes    Any GI sx? nasuea    Fever: 101 tmax   Myalgias: yes  Fatigue: yes    Fluid intake: good  Appetite: down    What was done to alleviate symptoms? tylenol    Risk Factors  Smoker?: former  COPD/underlying lung disease?: no  CAD/CHF?: no  DM2?: no  CKD?: no  Liver disease?: no  Immunosuppressed or current chemotherapy use?: no  History of sickle cell? no  Steroid use?no  Travel recently/Where?: no      Objective:  Physical Exam   Vitals:   Vitals:    10/26/21 1427   BP: (!) 140/90   Site: Left Upper Arm   Position: Sitting   Cuff Size: Large Adult   Pulse: 79   Temp: 99.4 °F (37.4 °C)   TempSrc: Tympanic   SpO2: 97%   Weight: 275 lb (124.7 kg)     Wt Readings from Last 3 Encounters:   10/26/21 275 lb (124.7 kg)   12/01/20 275 lb (124.7 kg)   11/27/20 272 lb (123.4 kg)     Ht Readings from Last 3 Encounters:   12/01/20 5' 1\" (1.549 m)   11/27/20 5' 1\" (1.549 m)   11/17/20 5' 1\" (1.549 m)     Body mass index is 51.96 kg/m². Constitutional: She is oriented to person, place, and time. She appears well-developed and well-nourished and in no acute distress. Answers all my questions appropriately. Head: Normocephalic and atraumatic. Eyes:conjunctiva appear normal.  Right Ear: External ear normal. TM is clear  Left Ear: External ear normal. TM is clear  Nose: pink, non-edematous mucosa. No polyps. No septal deviation  Throat: no erythema, tonsillar hypertrophy or exudate.  No ulcerations noted. Lips/Teeth/Gums all appear normal.  Neck: Normal range of motion. Neck supple. No tracheal deviation present. No abnormal lymphadenopathy. Heart - RRR w/o murmur. No S3/S4 noted  Chest: Clear to auscultation bilaterally. Good breath sounds noted. No rales, wheezes, or rhonchi noted. No respiratory retractions noted. Wall has symmetrical movement with respirations. POCT Strep: neg  Assessment:   Encounter Diagnoses   Name Primary?  Pharyngitis, unspecified etiology Yes    Fever, unspecified fever cause     Cough     Myalgia          Plan:   Push fluids    Use tyelnol or advil    Idf covid test is negative assume uri- sx can linger 3-14 days toatls     if worse callprovider    There are no discontinued medications. THE ABOVE NOTED DISCONTINUED MEDS MAY ONLY BE FROM 'CLEANING UP' THE MED LIST AND WERE NOT ACTUALLY CANCELED;  SEE CHART FOR DETAILS! No orders of the defined types were placed in this encounter. Orders Placed This Encounter   Procedures    POCT rapid strep A     Return in about 2 weeks (around 11/9/2021). There are no Patient Instructions on file for this visit. Follow up with provider    Note for work today and tomorrow          This visit was provided as a focused evaluation during the [facility] -19 pandemic/national emergency. A comprehensive review of all previous patient history and testing was not conducted. Pertinent findings were elicited during the visit.

## 2021-10-28 ENCOUNTER — E-VISIT (OUTPATIENT)
Dept: PRIMARY CARE CLINIC | Age: 56
End: 2021-10-28
Payer: COMMERCIAL

## 2021-10-28 DIAGNOSIS — B96.89 ACUTE BACTERIAL SINUSITIS: Primary | ICD-10-CM

## 2021-10-28 DIAGNOSIS — J01.90 ACUTE BACTERIAL SINUSITIS: Primary | ICD-10-CM

## 2021-10-28 PROCEDURE — 99422 OL DIG E/M SVC 11-20 MIN: CPT | Performed by: INTERNAL MEDICINE

## 2021-10-28 RX ORDER — FLUTICASONE PROPIONATE 50 MCG
2 SPRAY, SUSPENSION (ML) NASAL DAILY
Qty: 16 G | Refills: 0 | Status: SHIPPED | OUTPATIENT
Start: 2021-10-28 | End: 2022-08-10

## 2021-10-28 RX ORDER — DOXYCYCLINE HYCLATE 100 MG
100 TABLET ORAL 2 TIMES DAILY
Qty: 20 TABLET | Refills: 0 | Status: SHIPPED | OUTPATIENT
Start: 2021-10-28 | End: 2021-11-07

## 2021-10-28 ASSESSMENT — LIFESTYLE VARIABLES
PACKS_PER_DAY: 1
SMOKING_YEARS: 10
SMOKING_STATUS: NO, I'M A FORMER SMOKER

## 2021-10-28 NOTE — PROGRESS NOTES
You probably have a sinus infection. Please take the doxycycline that has been sent to the pharmacy as directed. Flonase nasal spray should help with the nasal congestion. Please use it as well as directed. Please follow-up with your primary care physician. 11 to 20 minutes were spent on this E visit.

## 2021-12-01 DIAGNOSIS — U07.1 COVID-19: ICD-10-CM

## 2021-12-01 RX ORDER — ALBUTEROL SULFATE 90 UG/1
4 AEROSOL, METERED RESPIRATORY (INHALATION) PRN
OUTPATIENT
Start: 2021-12-01

## 2021-12-01 RX ORDER — ACETAMINOPHEN 325 MG/1
650 TABLET ORAL
OUTPATIENT
Start: 2021-12-01

## 2021-12-01 RX ORDER — DIPHENHYDRAMINE HYDROCHLORIDE 50 MG/ML
50 INJECTION INTRAMUSCULAR; INTRAVENOUS
OUTPATIENT
Start: 2021-12-01

## 2021-12-01 RX ORDER — SODIUM CHLORIDE 9 MG/ML
25 INJECTION, SOLUTION INTRAVENOUS PRN
OUTPATIENT
Start: 2021-12-01

## 2021-12-01 RX ORDER — HEPARIN SODIUM (PORCINE) LOCK FLUSH IV SOLN 100 UNIT/ML 100 UNIT/ML
500 SOLUTION INTRAVENOUS PRN
OUTPATIENT
Start: 2021-12-01

## 2021-12-01 RX ORDER — SODIUM CHLORIDE 0.9 % (FLUSH) 0.9 %
5-40 SYRINGE (ML) INJECTION PRN
OUTPATIENT
Start: 2021-12-01

## 2021-12-01 RX ORDER — SODIUM CHLORIDE 9 MG/ML
INJECTION, SOLUTION INTRAVENOUS CONTINUOUS
OUTPATIENT
Start: 2021-12-01

## 2021-12-01 RX ORDER — EPINEPHRINE 1 MG/ML
0.3 INJECTION, SOLUTION, CONCENTRATE INTRAVENOUS PRN
OUTPATIENT
Start: 2021-12-01

## 2021-12-01 RX ORDER — ONDANSETRON 2 MG/ML
8 INJECTION INTRAMUSCULAR; INTRAVENOUS
OUTPATIENT
Start: 2021-12-01

## 2021-12-04 DIAGNOSIS — M54.40 ACUTE RIGHT-SIDED LOW BACK PAIN WITH SCIATICA, SCIATICA LATERALITY UNSPECIFIED: ICD-10-CM

## 2021-12-07 NOTE — TELEPHONE ENCOUNTER
Last visit: 11-17-20  Last Med refill: 3-8-21  Does patient have enough medication for 72 hours: No:     Next Visit Date:  No future appointments.     Health Maintenance   Topic Date Due    HIV screen  Never done    A1C test (Diabetic or Prediabetic)  02/25/2021    Lipid screen  02/25/2021    COVID-19 Vaccine (2 - Booster for Naseem series) 07/13/2021    Flu vaccine (1) 09/01/2021    Breast cancer screen  06/21/2023    DTaP/Tdap/Td vaccine (3 - Td or Tdap) 11/03/2027    Colon cancer screen colonoscopy  09/29/2030    Shingles Vaccine  Completed    Hepatitis C screen  Completed    Hepatitis A vaccine  Aged Out    Hepatitis B vaccine  Aged Out    Hib vaccine  Aged Out    Meningococcal (ACWY) vaccine  Aged Out    Pneumococcal 0-64 years Vaccine  Aged Out       Hemoglobin A1C (%)   Date Value   02/25/2020 6.1 (H)   11/03/2017 5.5   03/22/2017 5.6             ( goal A1C is < 7)   No results found for: LABMICR  LDL Cholesterol (mg/dL)   Date Value   02/25/2020 73   09/26/2018 71     LDL Calculated (mg/dL)   Date Value   08/06/2014 111   10/16/2013 120       (goal LDL is <100)   AST (U/L)   Date Value   02/25/2020 25     ALT (U/L)   Date Value   02/25/2020 40 (H)     BUN (mg/dL)   Date Value   02/25/2020 17     BP Readings from Last 3 Encounters:   10/26/21 (!) 140/90   12/01/20 130/70   11/27/20 136/82          (goal 120/80)    All Future Testing planned in [facility]  Lab Frequency Next Occurrence               Patient Active Problem List:     Hyperlipidemia     Thalassemia minor     Varicose veins     Lumbago     Vitamin D deficiency     Morbid obesity ([address]     Acquired trigger finger of left middle finger     Closed fracture of lateral malleolus     Fatigue     Fever     Fracture with nonunion     History of unsteady gait     Major depression, single episode     Osteopenia     Pain due to internal prosthetic device     Pain in joint involving ankle and foot     Gastroesophageal reflux disease Hypercholesterolemia     Pure hypercholesterolemia     Low back pain     Thalassemia     COVID-19

## 2021-12-09 RX ORDER — IBUPROFEN 800 MG/1
800 TABLET ORAL 2 TIMES DAILY PRN
Qty: 60 TABLET | Refills: 0 | Status: SHIPPED | OUTPATIENT
Start: 2021-12-09 | End: 2022-08-10

## 2021-12-13 ENCOUNTER — OFFICE VISIT (OUTPATIENT)
Dept: FAMILY MEDICINE CLINIC | Age: 56
End: 2021-12-13
Payer: COMMERCIAL

## 2021-12-13 VITALS
SYSTOLIC BLOOD PRESSURE: 122 MMHG | BODY MASS INDEX: 54.19 KG/M2 | HEIGHT: 60 IN | WEIGHT: 276 LBS | DIASTOLIC BLOOD PRESSURE: 74 MMHG | HEART RATE: 76 BPM | TEMPERATURE: 99 F | OXYGEN SATURATION: 99 %

## 2021-12-13 DIAGNOSIS — Z11.4 ENCOUNTER FOR SCREENING FOR HIV: Primary | ICD-10-CM

## 2021-12-13 DIAGNOSIS — Z13.220 LIPID SCREENING: ICD-10-CM

## 2021-12-13 DIAGNOSIS — R73.09 ELEVATED HEMOGLOBIN A1C: ICD-10-CM

## 2021-12-13 PROCEDURE — 99214 OFFICE O/P EST MOD 30 MIN: CPT | Performed by: STUDENT IN AN ORGANIZED HEALTH CARE EDUCATION/TRAINING PROGRAM

## 2021-12-13 PROCEDURE — 1036F TOBACCO NON-USER: CPT | Performed by: STUDENT IN AN ORGANIZED HEALTH CARE EDUCATION/TRAINING PROGRAM

## 2021-12-13 PROCEDURE — G8427 DOCREV CUR MEDS BY ELIG CLIN: HCPCS | Performed by: STUDENT IN AN ORGANIZED HEALTH CARE EDUCATION/TRAINING PROGRAM

## 2021-12-13 PROCEDURE — 3017F COLORECTAL CA SCREEN DOC REV: CPT | Performed by: STUDENT IN AN ORGANIZED HEALTH CARE EDUCATION/TRAINING PROGRAM

## 2021-12-13 PROCEDURE — G8417 CALC BMI ABV UP PARAM F/U: HCPCS | Performed by: STUDENT IN AN ORGANIZED HEALTH CARE EDUCATION/TRAINING PROGRAM

## 2021-12-13 PROCEDURE — G8484 FLU IMMUNIZE NO ADMIN: HCPCS | Performed by: STUDENT IN AN ORGANIZED HEALTH CARE EDUCATION/TRAINING PROGRAM

## 2021-12-13 RX ORDER — DOXYCYCLINE HYCLATE 100 MG
100 TABLET ORAL 2 TIMES DAILY
COMMUNITY
Start: 2021-12-07 | End: 2021-12-14

## 2021-12-13 SDOH — ECONOMIC STABILITY: FOOD INSECURITY: WITHIN THE PAST 12 MONTHS, THE FOOD YOU BOUGHT JUST DIDN'T LAST AND YOU DIDN'T HAVE MONEY TO GET MORE.: NEVER TRUE

## 2021-12-13 SDOH — ECONOMIC STABILITY: FOOD INSECURITY: WITHIN THE PAST 12 MONTHS, YOU WORRIED THAT YOUR FOOD WOULD RUN OUT BEFORE YOU GOT MONEY TO BUY MORE.: NEVER TRUE

## 2021-12-13 ASSESSMENT — ENCOUNTER SYMPTOMS
WHEEZING: 0
SHORTNESS OF BREATH: 0
ABDOMINAL PAIN: 0
COUGH: 0
SORE THROAT: 0
BACK PAIN: 0
DIARRHEA: 0
ABDOMINAL DISTENTION: 0
CONSTIPATION: 0
CHEST TIGHTNESS: 0

## 2021-12-13 ASSESSMENT — SOCIAL DETERMINANTS OF HEALTH (SDOH): HOW HARD IS IT FOR YOU TO PAY FOR THE VERY BASICS LIKE FOOD, HOUSING, MEDICAL CARE, AND HEATING?: NOT HARD AT ALL

## 2021-12-13 NOTE — PROGRESS NOTES
Nigel Willis (:  1965) is a 64 y.o. female,Established patient, here for evaluation of the following chief complaint(s):  Post-COVID Symptoms         ASSESSMENT/PLAN:  1. Encounter for screening for HIV  -     HIV Screen; Future  2. Lipid screening  -     Lipid Panel; Future  3. 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 Subjective   SUBJECTIVE/OBJECTIVE:  HPI: 64 F presents for covid follow up    Is having RODRIGUEZ and fatigue    Otherwise feels OK but anosmia persists        Review of Systems   Constitutional: Negative for chills, fatigue and fever. HENT: Negative for congestion, postnasal drip and sore throat. Eyes: Negative for visual disturbance. Respiratory: Negative for cough, chest tightness, shortness of breath and wheezing. Cardiovascular: Negative. Gastrointestinal: Negative for abdominal distention, abdominal pain, constipation and diarrhea. Genitourinary: Negative for difficulty urinating, dysuria, frequency and urgency. Musculoskeletal: Negative for arthralgias, back pain and joint swelling. Skin: Negative for rash. Neurological: Negative for dizziness, weakness and light-headedness. Psychiatric/Behavioral: Negative for agitation, decreased concentration and sleep disturbance. Objective   Physical Exam  Vitals and nursing note reviewed. Constitutional:       Appearance: Normal appearance. HENT:      Head: Normocephalic and atraumatic. Eyes:      Extraocular Movements: Extraocular movements intact. Cardiovascular:      Rate and Rhythm: Normal rate and regular rhythm. Pulses: Normal pulses. Heart sounds: Normal heart sounds. Pulmonary:      Effort: Pulmonary effort is normal.      Breath sounds: Normal breath sounds. Abdominal:      General: Abdomen is flat. Palpations: Abdomen is soft.    Musculoskeletal:         General: Normal range of motion. Cervical back: Normal range of motion and neck supple. Skin:     General: Skin is warm. Neurological:      General: No focal deficit present. Mental Status: She is alert and oriented to person, place, and time. An electronic signature was used to authenticate this note.     --Nerissa Goss MD

## 2021-12-14 ENCOUNTER — HOSPITAL ENCOUNTER (OUTPATIENT)
Age: 56
Setting detail: SPECIMEN
Discharge: HOME | End: 2021-12-14

## 2021-12-14 DIAGNOSIS — Z11.4 ENCOUNTER FOR SCREENING FOR HIV: ICD-10-CM

## 2021-12-14 DIAGNOSIS — Z13.220 LIPID SCREENING: ICD-10-CM

## 2021-12-14 DIAGNOSIS — R73.09 ELEVATED HEMOGLOBIN A1C: ICD-10-CM

## 2021-12-14 LAB — HIV AG/AB: NONREACTIVE

## 2021-12-15 LAB
CHOLESTEROL/HDL RATIO: 3.7
CHOLESTEROL: 208 MG/DL
ESTIMATED AVERAGE GLUCOSE: 128 MG/DL
HBA1C MFR BLD: 6.1 % (ref 4–6)
HDLC SERPL-MCNC: 56 MG/DL
LDL CHOLESTEROL: 96 MG/DL (ref 0–130)
TRIGL SERPL-MCNC: 281 MG/DL
VLDLC SERPL CALC-MCNC: ABNORMAL MG/DL (ref 1–30)

## 2022-03-11 DIAGNOSIS — E78.2 MIXED HYPERLIPIDEMIA: ICD-10-CM

## 2022-03-12 DIAGNOSIS — F33.0 MILD EPISODE OF RECURRENT MAJOR DEPRESSIVE DISORDER (HCC): ICD-10-CM

## 2022-03-14 ENCOUNTER — HOSPITAL ENCOUNTER (OUTPATIENT)
Dept: GENERAL RADIOLOGY | Facility: CLINIC | Age: 57
Discharge: HOME | End: 2022-03-16
Payer: COMMERCIAL

## 2022-03-14 ENCOUNTER — HOSPITAL ENCOUNTER (OUTPATIENT)
Age: 57
Discharge: HOME | End: 2022-03-16
Payer: COMMERCIAL

## 2022-03-14 ENCOUNTER — HOSPITAL ENCOUNTER (OUTPATIENT)
Age: 57
Setting detail: SPECIMEN
Discharge: HOME | End: 2022-03-14

## 2022-03-14 ENCOUNTER — OFFICE VISIT (OUTPATIENT)
Dept: PRIMARY CARE CLINIC | Age: 57
End: 2022-03-14
Payer: COMMERCIAL

## 2022-03-14 VITALS
BODY MASS INDEX: 52.73 KG/M2 | SYSTOLIC BLOOD PRESSURE: 120 MMHG | WEIGHT: 270 LBS | TEMPERATURE: 97.9 F | DIASTOLIC BLOOD PRESSURE: 80 MMHG | OXYGEN SATURATION: 97 % | HEART RATE: 61 BPM

## 2022-03-14 DIAGNOSIS — R07.81 RIB PAIN ON LEFT SIDE: Primary | ICD-10-CM
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
## 2022-03-14 DIAGNOSIS — B34.9 VIRAL INFECTION: ICD-10-CM

## 2022-03-14 DIAGNOSIS — R07.81 RIB PAIN ON LEFT SIDE: ICD-10-CM

## 2022-03-14 PROCEDURE — G8427 DOCREV CUR MEDS BY ELIG CLIN: HCPCS | Performed by: FAMILY MEDICINE

## 2022-03-14 PROCEDURE — 71101 X-RAY EXAM UNILAT RIBS/CHEST: CPT

## 2022-03-14 PROCEDURE — 96372 THER/PROPH/DIAG INJ SC/IM: CPT | Performed by: FAMILY MEDICINE

## 2022-03-14 PROCEDURE — G8417 CALC BMI ABV UP PARAM F/U: HCPCS | Performed by: FAMILY MEDICINE

## 2022-03-14 PROCEDURE — 99214 OFFICE O/P EST MOD 30 MIN: CPT | Performed by: FAMILY MEDICINE

## 2022-03-14 PROCEDURE — G8484 FLU IMMUNIZE NO ADMIN: HCPCS | Performed by: FAMILY MEDICINE

## 2022-03-14 PROCEDURE — 1036F TOBACCO NON-USER: CPT | Performed by: FAMILY MEDICINE

## 2022-03-14 PROCEDURE — 3017F COLORECTAL CA SCREEN DOC REV: CPT | Performed by: FAMILY MEDICINE

## 2022-03-14 RX ORDER — ATORVASTATIN CALCIUM 20 MG/1
TABLET, FILM COATED ORAL
Qty: 30 TABLET | Refills: 3 | Status: SHIPPED | OUTPATIENT
Start: 2022-03-14 | End: 2022-07-19

## 2022-03-14 RX ORDER — DICLOFENAC SODIUM 75 MG/1
75 TABLET, DELAYED RELEASE ORAL 2 TIMES DAILY WITH MEALS
Qty: 60 TABLET | Refills: 0 | Status: SHIPPED | OUTPATIENT
Start: 2022-03-14 | End: 2022-08-10

## 2022-03-14 RX ORDER — CYCLOBENZAPRINE HCL 10 MG
10 TABLET ORAL 3 TIMES DAILY PRN
Qty: 30 TABLET | Refills: 0 | Status: SHIPPED | OUTPATIENT
Start: 2022-03-14 | End: 2022-03-24

## 2022-03-14 RX ORDER — KETOROLAC TROMETHAMINE 30 MG/ML
60 INJECTION, SOLUTION INTRAMUSCULAR; INTRAVENOUS ONCE
Status: COMPLETED | OUTPATIENT
Start: 2022-03-14 | End: 2022-03-14

## 2022-03-14 RX ADMIN — KETOROLAC TROMETHAMINE 60 MG: 30 INJECTION, SOLUTION INTRAMUSCULAR; INTRAVENOUS at 07:00

## 2022-03-14 NOTE — LETTER
[address]  [address] [place] [address]  Phone: [phone]  Fax: [phone]    Niranjan Phelan MD        March 14, 2022     Patient: Lizeth Springer   YOB: 1965   Date of Visit: 3/14/2022       To Whom It May Concern: It is my medical opinion that Mary George should remain out of work until 3/16/22. If you have any questions or concerns, please don't hesitate to call.     Sincerely,        Niranjan Phelan MD

## 2022-03-14 NOTE — TELEPHONE ENCOUNTER
Last visit: 12/13/21  Last Med refill: 9/17/21  Does patient have enough medication for 72 hours: Yes    Next Visit Date:  No future appointments.     Health Maintenance   Topic Date Due    Depression Monitoring  Never done    COVID-19 Vaccine (2 - Booster for [employer] series) 07/13/2021    Flu vaccine (1) 12/13/2022 (Originally 9/1/2021)    A1C test (Diabetic or Prediabetic)  12/14/2022    Breast cancer screen  06/21/2023    Lipid screen  12/14/2026    DTaP/Tdap/Td vaccine (3 - Td or Tdap) 11/03/2027    Colorectal Cancer Screen  09/29/2030    Shingles Vaccine  Completed    Hepatitis C screen  Completed    HIV screen  Completed    Hepatitis A vaccine  Aged Out    Hepatitis B vaccine  Aged Out    Hib vaccine  Aged Out    Meningococcal (ACWY) vaccine  Aged Out    Pneumococcal 0-64 years Vaccine  Aged Out       Hemoglobin A1C (%)   Date Value   12/14/2021 6.1 (H)   02/25/2020 6.1 (H)   11/03/2017 5.5             ( goal A1C is < 7)   No results found for: LABMICR  LDL Cholesterol (mg/dL)   Date Value   12/14/2021 96   02/25/2020 73     LDL Calculated (mg/dL)   Date Value   08/06/2014 111   10/16/2013 120       (goal LDL is <100)   AST (U/L)   Date Value   02/25/2020 25     ALT (U/L)   Date Value   02/25/2020 40 (H)     BUN (mg/dL)   Date Value   02/25/2020 17     BP Readings from Last 3 Encounters:   12/13/21 122/74   10/26/21 (!) 140/90   12/01/20 130/70          (goal 120/80)    All Future Testing planned in [facility]  Lab Frequency Next Occurrence               Patient Active Problem List:     Hyperlipidemia     Thalassemia minor     Varicose veins     Lumbago     Vitamin D deficiency     Morbid obesity ([address]     Acquired trigger finger of left middle finger     Closed fracture of lateral malleolus     Fatigue     Fever     Fracture with nonunion     History of unsteady gait     Major depression, single episode     Osteopenia     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

## 2022-03-14 NOTE — TELEPHONE ENCOUNTER
Last visit: 12/13/21  Last Med refill: 9/17/21  Does patient have enough medication for 72 hours: Yes    Next Visit Date:  No future appointments.     Health Maintenance   Topic Date Due    Depression Monitoring  Never done    COVID-19 Vaccine (2 - Booster for [employer] series) 07/13/2021    Flu vaccine (1) 12/13/2022 (Originally 9/1/2021)    A1C test (Diabetic or Prediabetic)  12/14/2022    Breast cancer screen  06/21/2023    Lipid screen  12/14/2026    DTaP/Tdap/Td vaccine (3 - Td or Tdap) 11/03/2027    Colorectal Cancer Screen  09/29/2030    Shingles Vaccine  Completed    Hepatitis C screen  Completed    HIV screen  Completed    Hepatitis A vaccine  Aged Out    Hepatitis B vaccine  Aged Out    Hib vaccine  Aged Out    Meningococcal (ACWY) vaccine  Aged Out    Pneumococcal 0-64 years Vaccine  Aged Out       Hemoglobin A1C (%)   Date Value   12/14/2021 6.1 (H)   02/25/2020 6.1 (H)   11/03/2017 5.5             ( goal A1C is < 7)   No results found for: LABMICR  LDL Cholesterol (mg/dL)   Date Value   12/14/2021 96   02/25/2020 73     LDL Calculated (mg/dL)   Date Value   08/06/2014 111   10/16/2013 120       (goal LDL is <100)   AST (U/L)   Date Value   02/25/2020 25     ALT (U/L)   Date Value   02/25/2020 40 (H)     BUN (mg/dL)   Date Value   02/25/2020 17     BP Readings from Last 3 Encounters:   12/13/21 122/74   10/26/21 (!) 140/90   12/01/20 130/70          (goal 120/80)    All Future Testing planned in [facility]  Lab Frequency Next Occurrence               Patient Active Problem List:     Hyperlipidemia     Thalassemia minor     Varicose veins     Lumbago     Vitamin D deficiency     Morbid obesity ([address]     Acquired trigger finger of left middle finger     Closed fracture of lateral malleolus     Fatigue     Fever     Fracture with nonunion     History of unsteady gait     Major depression, single episode     Osteopenia     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

## 2022-03-15 ENCOUNTER — TELEPHONE (OUTPATIENT)
Dept: FAMILY MEDICINE CLINIC | Age: 57
End: 2022-03-15

## 2022-03-15 DIAGNOSIS — R07.81 RIB PAIN ON LEFT SIDE: ICD-10-CM
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
## 2022-03-15 LAB
SARS-COV-2: NORMAL
SARS-COV-2: NOT DETECTED
SOURCE: NORMAL

## 2022-03-15 NOTE — TELEPHONE ENCOUNTER
Margot Cuevas for a note for work this week. Follow up with her provider    She should not be twisting or lifting.

## 2022-03-15 NOTE — TELEPHONE ENCOUNTER
Patient found the results to her Xray on my chart and seen that there is a rib fracture. She would like to have a doctor note for work. She is a  at [employer] and lifts and twists all day at work. She does not want to be off but would like to know if she should have restrictions. Please advise.

## 2022-03-21 DIAGNOSIS — F33.0 MILD EPISODE OF RECURRENT MAJOR DEPRESSIVE DISORDER (HCC): ICD-10-CM

## 2022-03-21 NOTE — TELEPHONE ENCOUNTER
LOV 12-13-21  LRF 9-17-21    Health Maintenance   Topic Date Due    Depression Monitoring  Never done    COVID-19 Vaccine (2 - Booster for [employer] series) 07/13/2021    Flu vaccine (1) 12/13/2022 (Originally 9/1/2021)    A1C test (Diabetic or Prediabetic)  12/14/2022    Lipid screen  12/14/2022    Breast cancer screen  06/21/2023    DTaP/Tdap/Td vaccine (3 - Td or Tdap) 11/03/2027    Colorectal Cancer Screen  09/29/2030    Shingles Vaccine  Completed    Hepatitis C screen  Completed    HIV screen  Completed    Hepatitis A vaccine  Aged Out    Hepatitis B vaccine  Aged Out    Hib vaccine  Aged Out    Meningococcal (ACWY) vaccine  Aged Out    Pneumococcal 0-64 years Vaccine  Aged Out             (applicable per patient's age: Cancer Screenings, Depression Screening, Fall Risk Screening, Immunizations)    Hemoglobin A1C (%)   Date Value   12/14/2021 6.1 (H)   02/25/2020 6.1 (H)   11/03/2017 5.5     LDL Cholesterol (mg/dL)   Date Value   12/14/2021 96     LDL Calculated (mg/dL)   Date Value   08/06/2014 111     AST (U/L)   Date Value   02/25/2020 25     ALT (U/L)   Date Value   02/25/2020 40 (H)     BUN (mg/dL)   Date Value   02/25/2020 17      (goal A1C is < 7)   (goal LDL is <100) need 30-50% reduction from baseline     BP Readings from Last 3 Encounters:   03/14/22 120/80   12/13/21 122/74   10/26/21 (!) 140/90    (goal /80)      All Future Testing planned in [facility]:  Lab Frequency Next Occurrence       Next Visit Date:  No future appointments.          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

## 2022-03-22 ENCOUNTER — OFFICE VISIT (OUTPATIENT)
Dept: FAMILY MEDICINE CLINIC | Age: 57
End: 2022-03-22
Payer: COMMERCIAL

## 2022-03-22 VITALS
SYSTOLIC BLOOD PRESSURE: 138 MMHG | DIASTOLIC BLOOD PRESSURE: 80 MMHG | WEIGHT: 274.2 LBS | BODY MASS INDEX: 51.77 KG/M2 | HEIGHT: 61 IN

## 2022-03-22 DIAGNOSIS — S22.32XD CLOSED FRACTURE OF ONE RIB OF LEFT SIDE WITH ROUTINE HEALING, SUBSEQUENT ENCOUNTER: Primary | ICD-10-CM

## 2022-03-22 DIAGNOSIS — F33.0 MILD EPISODE OF RECURRENT MAJOR DEPRESSIVE DISORDER (HCC): ICD-10-CM

## 2022-03-22 PROCEDURE — 99213 OFFICE O/P EST LOW 20 MIN: CPT | Performed by: STUDENT IN AN ORGANIZED HEALTH CARE EDUCATION/TRAINING PROGRAM

## 2022-03-22 PROCEDURE — 1036F TOBACCO NON-USER: CPT | Performed by: STUDENT IN AN ORGANIZED HEALTH CARE EDUCATION/TRAINING PROGRAM

## 2022-03-22 PROCEDURE — G8484 FLU IMMUNIZE NO ADMIN: HCPCS | Performed by: STUDENT IN AN ORGANIZED HEALTH CARE EDUCATION/TRAINING PROGRAM

## 2022-03-22 PROCEDURE — 3017F COLORECTAL CA SCREEN DOC REV: CPT | Performed by: STUDENT IN AN ORGANIZED HEALTH CARE EDUCATION/TRAINING PROGRAM

## 2022-03-22 PROCEDURE — G8417 CALC BMI ABV UP PARAM F/U: HCPCS | Performed by: STUDENT IN AN ORGANIZED HEALTH CARE EDUCATION/TRAINING PROGRAM

## 2022-03-22 PROCEDURE — G8427 DOCREV CUR MEDS BY ELIG CLIN: HCPCS | Performed by: STUDENT IN AN ORGANIZED HEALTH CARE EDUCATION/TRAINING PROGRAM

## 2022-03-22 RX ORDER — CITALOPRAM 10 MG/1
10 TABLET ORAL DAILY
Qty: 90 TABLET | Refills: 1 | Status: SHIPPED | OUTPATIENT
Start: 2022-03-22 | End: 2022-09-14 | Stop reason: SDUPTHER

## 2022-03-22 RX ORDER — LIDOCAINE 50 MG/G
1 PATCH TOPICAL DAILY
Qty: 30 PATCH | Refills: 0 | Status: SHIPPED | OUTPATIENT
Start: 2022-03-22 | End: 2022-04-21

## 2022-03-22 ASSESSMENT — PATIENT HEALTH QUESTIONNAIRE - PHQ9
3. TROUBLE FALLING OR STAYING ASLEEP: 0
SUM OF ALL RESPONSES TO PHQ QUESTIONS 1-9: 6
4. FEELING TIRED OR HAVING LITTLE ENERGY: 3
SUM OF ALL RESPONSES TO PHQ9 QUESTIONS 1 & 2: 0
7. TROUBLE CONCENTRATING ON THINGS, SUCH AS READING THE NEWSPAPER OR WATCHING TELEVISION: 0
SUM OF ALL RESPONSES TO PHQ QUESTIONS 1-9: 6
1. LITTLE INTEREST OR PLEASURE IN DOING THINGS: 0
6. FEELING BAD ABOUT YOURSELF - OR THAT YOU ARE A FAILURE OR HAVE LET YOURSELF OR YOUR FAMILY DOWN: 0
10. IF YOU CHECKED OFF ANY PROBLEMS, HOW DIFFICULT HAVE THESE PROBLEMS MADE IT FOR YOU TO DO YOUR WORK, TAKE CARE OF THINGS AT HOME, OR GET ALONG WITH OTHER PEOPLE: 0
5. POOR APPETITE OR OVEREATING: 3
SUM OF ALL RESPONSES TO PHQ QUESTIONS 1-9: 6
SUM OF ALL RESPONSES TO PHQ QUESTIONS 1-9: 6
9. THOUGHTS THAT YOU WOULD BE BETTER OFF DEAD, OR OF HURTING YOURSELF: 0
2. FEELING DOWN, DEPRESSED OR HOPELESS: 0
8. MOVING OR SPEAKING SO SLOWLY THAT OTHER PEOPLE COULD HAVE NOTICED. OR THE OPPOSITE, BEING SO FIGETY OR RESTLESS THAT YOU HAVE BEEN MOVING AROUND A LOT MORE THAN USUAL: 0

## 2022-03-22 NOTE — LETTER
[address] [address]  Phone: [phone]  Fax: [phone]    Carla Daniel MD        March 22, 2022     Patient: Ирина Rico   YOB: 1965   Date of Visit: 3/22/2022       To Whom It May Concern: It is my medical opinion that Jenn Stover may return to light duty immediately with the following restrictions: lifting/carrying not to exceed 5 lbs, no twisting or bending. If you have any questions or concerns, please don't hesitate to call.     Sincerely,        Carla Daniel MD

## 2022-03-22 NOTE — PROGRESS NOTES
Nadine Davidson (:  1965) is a 64 y.o. female,Established patient, here for evaluation of the following chief complaint(s): Other (walkin follow up fractured ribs lt side needs note for work)         ASSESSMENT/PLAN:  1. Closed fracture of one rib of left side with routine healing, subsequent encounter  -     lidocaine (LIDODERM) 5 %; Place 1 patch onto the skin daily 12 hours on, 12 hours off., Disp-30 patch, R-0Normal  2. Mild episode of recurrent major depressive disorder (HCC)  -     citalopram (CELEXA) 10 MG tablet; Take 1 tablet by mouth daily, Disp-90 tablet, R-1Normal    Broken rib  takins nsaids and tylenol  See me as needed  Try lidocaine patches for extra relief    No follow-ups on file. Subjective   SUBJECTIVE/OBJECTIVE:  HPI: 64 F broke rib after accidental fall    Review of Systems Pertinent positives and negatives included in HPI 14 point ROS otherwise negative         Objective   Physical Exam pain on palpation of left rib area  ncat eomi  rrr s1 s2 normal physiologic splitting  abd soft nontender nondistended  Normal affect and mood    An electronic signature was used to authenticate this note.     --Son Hernandez MD

## 2022-03-24 RX ORDER — KETOROLAC TROMETHAMINE 30 MG/ML
60 INJECTION, SOLUTION INTRAMUSCULAR; INTRAVENOUS ONCE
Qty: 2 ML | Refills: 0
Start: 2022-03-24 | End: 2022-08-10

## 2022-04-06 RX ORDER — CITALOPRAM 10 MG/1
TABLET ORAL
Qty: 30 TABLET | Refills: 3 | OUTPATIENT
Start: 2022-04-06

## 2022-04-06 RX ORDER — CITALOPRAM 10 MG/1
10 TABLET ORAL DAILY
Qty: 30 TABLET | Refills: 5 | OUTPATIENT
Start: 2022-04-06 | End: 2023-04-06

## 2022-07-16 DIAGNOSIS — E78.2 MIXED HYPERLIPIDEMIA: ICD-10-CM

## 2022-07-18 NOTE — TELEPHONE ENCOUNTER
LOV 3/22/22  LRF 3/14/22  RTO LM for patient to schedule to meet new provider    Health Maintenance   Topic Date Due    COVID-19 Vaccine (2 - Booster for [employer] series) 07/13/2021    Flu vaccine (1) 09/01/2022    A1C test (Diabetic or Prediabetic)  12/14/2022    Lipids  12/14/2022    Depression Monitoring  03/22/2023    Breast cancer screen  06/22/2024    DTaP/Tdap/Td vaccine (3 - Td or Tdap) 11/03/2027    Colorectal Cancer Screen  09/29/2030    Shingles vaccine  Completed    Hepatitis C screen  Completed    HIV screen  Completed    Hepatitis A vaccine  Aged Out    Hepatitis B vaccine  Aged Out    Hib vaccine  Aged Out    Meningococcal (ACWY) vaccine  Aged Out    Pneumococcal 0-64 years Vaccine  Aged Out             (applicable per patient's age: Cancer Screenings, Depression Screening, Fall Risk Screening, Immunizations)    Hemoglobin A1C (%)   Date Value   12/14/2021 6.1 (H)   02/25/2020 6.1 (H)   11/03/2017 5.5     LDL Cholesterol (mg/dL)   Date Value   12/14/2021 96     LDL Calculated (mg/dL)   Date Value   08/06/2014 111     AST (U/L)   Date Value   02/25/2020 25     ALT (U/L)   Date Value   02/25/2020 40 (H)     BUN (mg/dL)   Date Value   02/25/2020 17      (goal A1C is < 7)   (goal LDL is <100) need 30-50% reduction from baseline     BP Readings from Last 3 Encounters:   03/22/22 138/80   03/14/22 120/80   12/13/21 122/74    (goal /80)      All Future Testing planned in [facility]:  Lab Frequency Next Occurrence       Next Visit Date:  No future appointments.          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

## 2022-07-19 RX ORDER — ATORVASTATIN CALCIUM 20 MG/1
20 TABLET, FILM COATED ORAL DAILY
Qty: 30 TABLET | Refills: 5 | Status: SHIPPED | OUTPATIENT
Start: 2022-07-19 | End: 2022-10-19 | Stop reason: SDUPTHER

## 2022-08-10 ENCOUNTER — HOSPITAL ENCOUNTER (OUTPATIENT)
Facility: CLINIC | Age: 57
Discharge: HOME | End: 2022-08-10
Payer: COMMERCIAL

## 2022-08-10 ENCOUNTER — HOSPITAL ENCOUNTER (OUTPATIENT)
Dept: CT IMAGING | Facility: CLINIC | Age: 57
Discharge: HOME | End: 2022-08-12
Payer: COMMERCIAL

## 2022-08-10 ENCOUNTER — OFFICE VISIT (OUTPATIENT)
Dept: PRIMARY CARE CLINIC | Age: 57
End: 2022-08-10
Payer: COMMERCIAL

## 2022-08-10 VITALS
OXYGEN SATURATION: 99 % | TEMPERATURE: 97.9 F | HEART RATE: 58 BPM | SYSTOLIC BLOOD PRESSURE: 108 MMHG | BODY MASS INDEX: 51.85 KG/M2 | WEIGHT: 274.4 LBS | DIASTOLIC BLOOD PRESSURE: 78 MMHG

## 2022-08-10 DIAGNOSIS — R10.10 PAIN OF UPPER ABDOMEN: Primary | ICD-10-CM

## 2022-08-10 DIAGNOSIS — R19.7 DIARRHEA, UNSPECIFIED TYPE: ICD-10-CM

## 2022-08-10 DIAGNOSIS — R11.0 NAUSEA: ICD-10-CM

## 2022-08-10 DIAGNOSIS — R10.10 PAIN OF UPPER ABDOMEN: ICD-10-CM

## 2022-08-10 LAB
ALBUMIN SERPL-MCNC: 4.2 G/DL (ref 3.5–5.2)
ALBUMIN/GLOBULIN RATIO: 1.6 (ref 1–2.5)
ALP BLD-CCNC: 88 U/L (ref 35–104)
ALT SERPL-CCNC: 18 U/L (ref 5–33)
ANION GAP SERPL CALCULATED.3IONS-SCNC: 10 MMOL/L (ref 9–17)
AST SERPL-CCNC: 20 U/L
BILIRUB SERPL-MCNC: 0.57 MG/DL (ref 0.3–1.2)
BUN BLDV-MCNC: 9 MG/DL (ref 6–20)
CALCIUM SERPL-MCNC: 9.5 MG/DL (ref 8.6–10.4)
CHLORIDE BLD-SCNC: 105 MMOL/L (ref 98–107)
CO2: 28 MMOL/L (ref 20–31)
CREAT SERPL-MCNC: 0.63 MG/DL (ref 0.5–0.9)
GFR AFRICAN AMERICAN: >60 ML/MIN
GFR NON-AFRICAN AMERICAN: >60 ML/MIN
GFR SERPL CREATININE-BSD FRML MDRD: NORMAL ML/MIN/{1.73_M2}
GLUCOSE BLD-MCNC: 82 MG/DL (ref 70–99)
POTASSIUM SERPL-SCNC: 3.9 MMOL/L (ref 3.7–5.3)
SODIUM BLD-SCNC: 143 MMOL/L (ref 135–144)
TOTAL PROTEIN: 6.8 G/DL (ref 6.4–8.3)

## 2022-08-10 PROCEDURE — 36415 COLL VENOUS BLD VENIPUNCTURE: CPT

## 2022-08-10 PROCEDURE — 87329 GIARDIA AG IA: CPT

## 2022-08-10 PROCEDURE — 80053 COMPREHEN METABOLIC PANEL: CPT

## 2022-08-10 PROCEDURE — 3017F COLORECTAL CA SCREEN DOC REV: CPT | Performed by: PHYSICIAN ASSISTANT

## 2022-08-10 PROCEDURE — G8427 DOCREV CUR MEDS BY ELIG CLIN: HCPCS | Performed by: PHYSICIAN ASSISTANT

## 2022-08-10 PROCEDURE — 74177 CT ABD & PELVIS W/CONTRAST: CPT

## 2022-08-10 PROCEDURE — 6360000004 HC RX CONTRAST MEDICATION: Performed by: PHYSICIAN ASSISTANT

## 2022-08-10 PROCEDURE — 87506 IADNA-DNA/RNA PROBE TQ 6-11: CPT

## 2022-08-10 PROCEDURE — G8417 CALC BMI ABV UP PARAM F/U: HCPCS | Performed by: PHYSICIAN ASSISTANT

## 2022-08-10 PROCEDURE — 1036F TOBACCO NON-USER: CPT | Performed by: PHYSICIAN ASSISTANT

## 2022-08-10 PROCEDURE — 87324 CLOSTRIDIUM AG IA: CPT

## 2022-08-10 PROCEDURE — 2580000003 HC RX 258: Performed by: PHYSICIAN ASSISTANT

## 2022-08-10 PROCEDURE — 99214 OFFICE O/P EST MOD 30 MIN: CPT | Performed by: PHYSICIAN ASSISTANT

## 2022-08-10 PROCEDURE — 87449 NOS EACH ORGANISM AG IA: CPT

## 2022-08-10 PROCEDURE — 85025 COMPLETE CBC W/AUTO DIFF WBC: CPT

## 2022-08-10 RX ORDER — 0.9 % SODIUM CHLORIDE 0.9 %
70 INTRAVENOUS SOLUTION INTRAVENOUS ONCE
Status: COMPLETED | OUTPATIENT
Start: 2022-08-10 | End: 2022-08-10

## 2022-08-10 RX ORDER — SODIUM CHLORIDE 0.9 % (FLUSH) 0.9 %
10 SYRINGE (ML) INJECTION PRN
Status: DISCONTINUED | OUTPATIENT
Start: 2022-08-10 | End: 2022-08-13 | Stop reason: HOSPADM

## 2022-08-10 RX ADMIN — IOHEXOL 50 ML: 240 INJECTION, SOLUTION INTRATHECAL; INTRAVASCULAR; INTRAVENOUS; ORAL at 14:15

## 2022-08-10 RX ADMIN — SODIUM CHLORIDE 70 ML: 9 INJECTION, SOLUTION INTRAVENOUS at 15:23

## 2022-08-10 RX ADMIN — SODIUM CHLORIDE, PRESERVATIVE FREE 10 ML: 5 INJECTION INTRAVENOUS at 15:22

## 2022-08-10 RX ADMIN — IOPAMIDOL 75 ML: 755 INJECTION, SOLUTION INTRAVENOUS at 15:24

## 2022-08-10 ASSESSMENT — ENCOUNTER SYMPTOMS
BLOOD IN STOOL: 0
RESPIRATORY NEGATIVE: 1
CONSTIPATION: 0
NAUSEA: 1
ABDOMINAL PAIN: 1
DIARRHEA: 1

## 2022-08-10 NOTE — PROGRESS NOTES
[address]  [address] [facility]  Phone: [phone]  Fax: Jono Everett    Pt Name: Aly Olivas  MRN: [id number]  [place] 1965  Date of evaluation: 8/10/2022  Provider: Herminio Miguel PA-C     CHIEF COMPLAINT       Chief Complaint   Patient presents with    Diarrhea     Started 2.5 weeks ago. Abdominal Pain    Nausea    Chills    Fatigue           HISTORY OF PRESENT ILLNESS  (Location/Symptom, Timing/Onset, Context/Setting, Quality, Duration, Modifying Factors, Severity.)   Aly Olivas is a 64 y.o. White (non-) [1] female who presents to the office for evaluation of      Abdominal Pain  This is a new problem. The current episode started 1 to 4 weeks ago. The problem has been waxing and waning. The pain is located in the LUQ, RUQ and epigastric region. The pain is at a severity of 6/10. The pain is moderate. The quality of the pain is aching and dull. Associated symptoms include diarrhea and nausea. Pertinent negatives include no anorexia, constipation, dysuria, fever or headaches. Associated symptoms comments: Chills and fatigue. The pain is aggravated by palpation, eating and movement. Nursing Notes were reviewed. REVIEW OF SYSTEMS    (2-9 systems for level 4, 10 or more for level 5)     Review of Systems   Constitutional:  Negative for fever. HENT: Negative. Respiratory: Negative. Cardiovascular: Negative. Gastrointestinal:  Positive for abdominal pain, diarrhea and nausea. Negative for anorexia, blood in stool and constipation. Genitourinary:  Negative for dysuria. Musculoskeletal: Negative. Skin: Negative. Neurological:  Negative for headaches. Except as noted above the remainder of the review of systems was reviewed andnegative. PAST MEDICAL HISTORY   History reviewed.     Past Medical History:   Diagnosis Date    Back pain     RATE 10    Beta thalassemia minor     Depression MILD    Diverticulitis     past    E-coli UTI     Fall     AT HOME 3 WEEKS AGO    Gastric ulcer     resolved    GERD (gastroesophageal reflux disease)     resolved    Hyperlipidemia     Obesity     PONV (postoperative nausea and vomiting)     REQUEST PREOP MEDS FOR N/V    Snores     Wears glasses          SURGICAL HISTORY     History reviewed. Past Surgical History:   Procedure Laterality Date    ANKLE SURGERY Right 10/2018    ANKLE SURGERY Right 10/24/2019    [facility] Dr Angel Skinner - hardware removal    BACK SURGERY       SECTION      CHOLECYSTECTOMY      COLONOSCOPY      COLONOSCOPY N/A 2020    COLONOSCOPY WITH BIOPSY performed by Heydi Shell DO at [facility]    ENDOSCOPY, COLON, DIAGNOSTIC      LUMBAR DISCECTOMY      L3-4    OTHER SURGICAL HISTORY  2015    vertebral augmentation T11    SHOULDER SURGERY Left     fx    SHADY AND BSO (CERVIX REMOVED)      UPPER GASTROINTESTINAL ENDOSCOPY  2011         CURRENT MEDICATIONS       Current Outpatient Medications   Medication Sig Dispense Refill    atorvastatin (LIPITOR) 20 MG tablet Take 1 tablet by mouth in the morning. 30 tablet 5    citalopram (CELEXA) 10 MG tablet Take 1 tablet by mouth daily 90 tablet 1    vitamin D3 (CHOLECALCIFEROL) 25 MCG (1000 UT) TABS tablet Take 1 tablet by mouth daily 90 tablet 1    albuterol sulfate HFA (PROVENTIL HFA) 108 (90 Base) MCG/ACT inhaler Inhale 2 puffs into the lungs every 6 hours as needed for Shortness of Breath (chest tightness) 1 Inhaler 0    Cholecalciferol (VITAMIN D) 10 MCG/ML LIQD       calcium carbonate (OSCAL) 500 MG TABS tablet Take 500 mg by mouth daily      Ascorbic Acid (VITAMIN C) 250 MG tablet Take 250 mg by mouth daily       No current facility-administered medications for this visit.      Facility-Administered Medications Ordered in Other Visits   Medication Dose Route Frequency Provider Last Rate Last Admin    sodium chloride flush 0.9 % injection 10 mL  10 mL IntraVENous PRN Anusha Martínez LATHA Andrea   10 mL at 08/10/22 1522         ALLERGIES     Bacitracin, Hydrocodone-acetaminophen, Percocet [oxycodone-acetaminophen], Tramadol, Vicodin [hydrocodone-acetaminophen], and Codeine    FAMILY HISTORY           Problem Relation Age of Onset    Diabetes Sister     Other Sister      Family Status   Relation Name Status    Mother      Father      Sister  Alive    Brother  Alive    MGM      MGF      PGM      PGF      Sister  (Not Specified)          SOCIAL HISTORY      reports that she quit smoking about 11 years ago. Her smoking use included cigarettes. She has a 3.00 pack-year smoking history. She has never used smokeless tobacco. She reports current alcohol use. She reports that she does not use drugs. PHYSICAL EXAM    (up to 7 for level 4, 8 or more for level 5)     Vitals:    08/10/22 1142   BP: 108/78   Site: Left Upper Arm   Position: Sitting   Cuff Size: Large Adult   Pulse: 58   Temp: 97.9 °F (36.6 °C)   SpO2: 99%   Weight: 274 lb 6.4 oz (124.5 kg)         Physical Exam  Vitals and nursing note reviewed. Constitutional:       General: She is not in acute distress. Appearance: Normal appearance. She is not ill-appearing. HENT:      Head: Normocephalic and atraumatic. Right Ear: External ear normal.      Left Ear: External ear normal.      Nose: Nose normal.      Mouth/Throat:      Mouth: Mucous membranes are moist.   Eyes:      Extraocular Movements: Extraocular movements intact. Conjunctiva/sclera: Conjunctivae normal.      Pupils: Pupils are equal, round, and reactive to light. Pulmonary:      Effort: Pulmonary effort is normal.   Abdominal:      Palpations: Abdomen is soft. Tenderness: There is abdominal tenderness in the right upper quadrant, epigastric area and left upper quadrant. Skin:     General: Skin is warm and dry. Neurological:      Mental Status: She is alert and oriented to person, place, and time. DIFFERENTIAL DIAGNOSIS:       Tanya Patel reviewed the disposition diagnosis with the patient and or their family/guardian. I have answered their questions and given discharge instructions. They voiced understanding of these instructions and did not have anyfurther questions or complaints. PROCEDURES:  Orders Placed This Encounter   Procedures    Gastrointestinal Panel, Molecular     Standing Status:   Future     Number of Occurrences:   1     Standing Expiration Date:   9/10/2022    Clostridium Difficile Toxin/Antigen     Standing Status:   Future     Number of Occurrences:   1     Standing Expiration Date:   8/10/2023    Giardia Antigen     Standing Status:   Future     Number of Occurrences:   1     Standing Expiration Date:   8/10/2023    CT ABDOMEN PELVIS W IV CONTRAST Additional Contrast? Oral     Standing Status:   Future     Number of Occurrences:   1     Standing Expiration Date:   8/10/2023     Order Specific Question:   Additional Contrast?     Answer:   Oral     Order Specific Question:   STAT Creatinine as needed:     Answer:   Yes     Order Specific Question:   Reason for exam:     Answer:   upper abdominal pain, nausea, diarrhea x 2.5 weeks. History of diverticulitis. CBC with Auto Differential     Standing Status:   Future     Number of Occurrences:   1     Standing Expiration Date:   8/10/2023    Comprehensive Metabolic Panel     Standing Status:   Future     Number of Occurrences:   1     Standing Expiration Date:   8/10/2023       No results found for this visit on 08/10/22.     FINALIMPRESSION      Visit Diagnoses and Associated Orders       Pain of upper abdomen    -  Primary    Gastrointestinal Panel, Molecular [[id number] Custom]   - Future Order    Clostridium Difficile Toxin/Antigen [[id number] Custom]   - Future Order    Giardia Antigen [[id number] Custom]   - Future Order    CBC with Auto Differential [[id number] Custom]   - Future Order    Comprehensive Metabolic Panel [[id number] Custom]   - Future

## 2022-08-11 LAB
ABSOLUTE EOS #: 0.11 K/UL (ref 0–0.44)
ABSOLUTE IMMATURE GRANULOCYTE: 0 K/UL (ref 0–0.3)
ABSOLUTE LYMPH #: 1.51 K/UL (ref 1.1–3.7)
ABSOLUTE MONO #: 0.38 K/UL (ref 0.1–1.2)
BASOPHILS # BLD: 0 % (ref 0–2)
BASOPHILS ABSOLUTE: 0 K/UL (ref 0–0.2)
C DIFF AG + TOXIN: NEGATIVE
CAMPYLOBACTER PCR: NORMAL
DIRECT EXAM: NORMAL
E COLI ENTEROTOXIGENIC PCR: NORMAL
EOSINOPHILS RELATIVE PERCENT: 2 % (ref 1–4)
HCT VFR BLD CALC: 39 % (ref 36.3–47.1)
HEMOGLOBIN: 11.9 G/DL (ref 11.9–15.1)
IMMATURE GRANULOCYTES: 0 %
LYMPHOCYTES # BLD: 28 % (ref 24–43)
MCH RBC QN AUTO: 20.2 PG (ref 25.2–33.5)
MCHC RBC AUTO-ENTMCNC: 30.5 G/DL (ref 28.4–34.8)
MCV RBC AUTO: 66.1 FL (ref 82.6–102.9)
MONOCYTES # BLD: 7 % (ref 3–12)
MORPHOLOGY: ABNORMAL
NRBC AUTOMATED: 0 PER 100 WBC
PDW BLD-RTO: 17.5 % (ref 11.8–14.4)
PLATELET # BLD: 267 K/UL (ref 138–453)
PLESIOMONAS SHIGELLOIDES PCR: NORMAL
PMV BLD AUTO: 11.5 FL (ref 8.1–13.5)
RBC # BLD: 5.9 M/UL (ref 3.95–5.11)
SALMONELLA PCR: NORMAL
SEG NEUTROPHILS: 63 % (ref 36–65)
SEGMENTED NEUTROPHILS ABSOLUTE COUNT: 3.4 K/UL (ref 1.5–8.1)
SHIGATOXIN GENE PCR: NORMAL
SHIGELLA SP PCR: NORMAL
SPECIMEN DESCRIPTION: NORMAL
VIBRIO PCR: NORMAL
WBC # BLD: 5.4 K/UL (ref 3.5–11.3)
YERSINIA ENTEROCOLITICA PCR: NORMAL

## 2022-09-12 DIAGNOSIS — F33.0 MILD EPISODE OF RECURRENT MAJOR DEPRESSIVE DISORDER (HCC): ICD-10-CM

## 2022-09-14 DIAGNOSIS — F33.0 MILD EPISODE OF RECURRENT MAJOR DEPRESSIVE DISORDER (HCC): ICD-10-CM

## 2022-09-14 RX ORDER — CITALOPRAM 10 MG/1
TABLET ORAL
Qty: 90 TABLET | Refills: 1 | OUTPATIENT
Start: 2022-09-14

## 2022-09-14 RX ORDER — CITALOPRAM 10 MG/1
10 TABLET ORAL DAILY
Qty: 90 TABLET | Refills: 0 | Status: SHIPPED | OUTPATIENT
Start: 2022-09-14 | End: 2023-09-14

## 2022-09-14 NOTE — TELEPHONE ENCOUNTER
LOV & LRF 3-22-22    Health Maintenance   Topic Date Due    COVID-19 Vaccine (2 - Booster for [employer] series) 07/13/2021    Flu vaccine (1) 09/01/2022    A1C test (Diabetic or Prediabetic)  12/14/2022    Lipids  12/14/2022    Depression Monitoring  03/22/2023    Breast cancer screen  06/22/2024    DTaP/Tdap/Td vaccine (3 - Td or Tdap) 11/03/2027    Colorectal Cancer Screen  09/29/2030    Shingles vaccine  Completed    Hepatitis C screen  Completed    HIV screen  Completed    Hepatitis A vaccine  Aged Out    Hepatitis B vaccine  Aged Out    Hib vaccine  Aged Out    Meningococcal (ACWY) vaccine  Aged Out    Pneumococcal 0-64 years Vaccine  Aged Out             (applicable per patient's age: Cancer Screenings, Depression Screening, Fall Risk Screening, Immunizations)    Hemoglobin A1C (%)   Date Value   12/14/2021 6.1 (H)   02/25/2020 6.1 (H)   11/03/2017 5.5     LDL Cholesterol (mg/dL)   Date Value   12/14/2021 96     LDL Calculated (mg/dL)   Date Value   08/06/2014 111     AST (U/L)   Date Value   08/10/2022 20     ALT (U/L)   Date Value   08/10/2022 18     BUN (mg/dL)   Date Value   08/10/2022 9      (goal A1C is < 7)   (goal LDL is <100) need 30-50% reduction from baseline     BP Readings from Last 3 Encounters:   08/10/22 108/78   03/22/22 138/80   03/14/22 120/80    (goal /80)      All Future Testing planned in [facility]:  Lab Frequency Next Occurrence       Next Visit Date:  Future Appointments   Date Time Provider Zay Canas   10/19/2022 10:30 AM [facility]            Patient Active Problem List:     Hyperlipidemia     Thalassemia minor     Varicose veins     Lumbago     Vitamin D deficiency     Morbid obesity ([address]     Acquired trigger finger of left middle finger     Closed fracture of lateral malleolus     Fatigue     Fever     Fracture with nonunion     History of unsteady gait     Major depression, single episode     Osteopenia     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

## 2022-10-19 ENCOUNTER — OFFICE VISIT (OUTPATIENT)
Dept: FAMILY MEDICINE CLINIC | Age: 57
End: 2022-10-19
Payer: COMMERCIAL

## 2022-10-19 ENCOUNTER — HOSPITAL ENCOUNTER (OUTPATIENT)
Age: 57
Setting detail: SPECIMEN
Discharge: HOME | End: 2022-10-19

## 2022-10-19 VITALS
OXYGEN SATURATION: 94 % | BODY MASS INDEX: 51.01 KG/M2 | WEIGHT: 270.2 LBS | HEIGHT: 61 IN | DIASTOLIC BLOOD PRESSURE: 62 MMHG | SYSTOLIC BLOOD PRESSURE: 108 MMHG | HEART RATE: 71 BPM

## 2022-10-19 DIAGNOSIS — E66.01 MORBID OBESITY (HCC): ICD-10-CM

## 2022-10-19 DIAGNOSIS — Z76.89 ENCOUNTER TO ESTABLISH CARE: Primary | ICD-10-CM

## 2022-10-19 DIAGNOSIS — E78.2 MIXED HYPERLIPIDEMIA: ICD-10-CM

## 2022-10-19 DIAGNOSIS — R73.03 PRE-DIABETES: ICD-10-CM

## 2022-10-19 DIAGNOSIS — M25.561 ACUTE PAIN OF RIGHT KNEE: ICD-10-CM

## 2022-10-19 LAB
CHOLESTEROL/HDL RATIO: 3.1
CHOLESTEROL: 176 MG/DL
HDLC SERPL-MCNC: 56 MG/DL
LDL CHOLESTEROL: 78 MG/DL (ref 0–130)
TRIGL SERPL-MCNC: 212 MG/DL

## 2022-10-19 PROCEDURE — G8417 CALC BMI ABV UP PARAM F/U: HCPCS

## 2022-10-19 PROCEDURE — 1036F TOBACCO NON-USER: CPT

## 2022-10-19 PROCEDURE — G8484 FLU IMMUNIZE NO ADMIN: HCPCS

## 2022-10-19 PROCEDURE — 3017F COLORECTAL CA SCREEN DOC REV: CPT

## 2022-10-19 PROCEDURE — G8427 DOCREV CUR MEDS BY ELIG CLIN: HCPCS

## 2022-10-19 PROCEDURE — 99214 OFFICE O/P EST MOD 30 MIN: CPT

## 2022-10-19 RX ORDER — DOXYCYCLINE HYCLATE 20 MG
20 TABLET ORAL 2 TIMES DAILY
COMMUNITY
End: 2022-10-19 | Stop reason: CLARIF

## 2022-10-19 RX ORDER — DICYCLOMINE HYDROCHLORIDE 10 MG/1
20 CAPSULE ORAL 3 TIMES DAILY
COMMUNITY

## 2022-10-19 RX ORDER — OMEPRAZOLE 40 MG/1
40 CAPSULE, DELAYED RELEASE ORAL DAILY
COMMUNITY

## 2022-10-19 RX ORDER — ATORVASTATIN CALCIUM 20 MG/1
20 TABLET, FILM COATED ORAL DAILY
Qty: 90 TABLET | Refills: 3 | Status: SHIPPED | OUTPATIENT
Start: 2022-10-19 | End: 2023-10-19

## 2022-10-19 ASSESSMENT — ENCOUNTER SYMPTOMS
SHORTNESS OF BREATH: 0
ABDOMINAL PAIN: 1
VOMITING: 0
DIARRHEA: 0
NAUSEA: 0
CONSTIPATION: 0
BACK PAIN: 1
EYE DISCHARGE: 0
COUGH: 0
SORE THROAT: 0

## 2022-10-19 NOTE — PROGRESS NOTES
Trell Ambriz (:  1965) is a 62 y.o. female,Established patient, here for evaluation of the following chief complaint(s):  New Patient (New pt to establish care, Hx of depression and Rt side sciatica pain. Was recently seen in [place] for rt abdomen pain had a CT done Dx with colonic diverticulosis w/o acute diverticulitis and hepatomegaly with mild steatosis. Pt states she still has pain but is being seen by o gastro. Dr. Michalene Cheadle) and Knee Pain (Rt knee pain, on going since July no known injury. Says there is an ulcer in there.)         ASSESSMENT/PLAN:  1. Encounter to establish care  2. Acute pain of right knee  3. Morbid obesity ([address]  4. Pre-diabetes  -     Hemoglobin A1C; Future  5. Mixed hyperlipidemia  -     atorvastatin (LIPITOR) 20 MG tablet; Take 1 tablet by mouth daily, Disp-90 tablet, R-3Normal  -     Lipid Panel; Future    To complete labs as ordered. Encouraged to follow a low carbohydrate and low-fat diet. At this time there is no acute findings with the right knee on examination. Patient has been advised to rest, ice, elevate, and use Tylenol at this time due to treating gastric ulcer. Did advise patient physical therapy, however at this time she has declined and would rather monitor symptoms. If patient symptoms do not improve, we will refer her to orthopedics for further evaluation. Also significant discussion with patient about potentially starting Ozempic for both prediabetes and blood sugar management, along with her weight. Patient does meet clinical guidelines for metabolic syndrome, and could greatly benefit from the weight loss as well as blood sugar control. We will discuss this further at her routine physical, and she has been encouraged to schedule this for 2 months from now. Return in about 2 months (around 2022) for physical and lab review . Subjective   SUBJECTIVE/OBJECTIVE:  Patient here to establish care and med refills.     Recently seen in [facility] for abdominal pain and has since seen GI, Dr Carly Randhawa. She was scoped and diagnosed with a gastric ulcer. She has a repeat scope scheduled for Nov 7th. Pain is improving but still uncomfortable. Has had some new onset right knee pain. Just the last few days. No specific injury she recalls. Patient's previous A1c was reviewed with her today, which did place her in the prediabetic range. Patient was unaware of this. Patient does also have noted a BMI greater than 50, and states she has battled her weight most of her life. She does contribute a lot of her aches and pains to her weight. Knee Pain   The incident occurred 3 to 5 days ago. There was no injury mechanism. The pain is present in the right knee. The quality of the pain is described as stabbing. The pain is at a severity of 5/10. The pain is moderate. The pain has been Constant since onset. Pertinent negatives include no inability to bear weight, loss of motion, muscle weakness, numbness or tingling. She reports no foreign bodies present. The symptoms are aggravated by movement and weight bearing. She has tried acetaminophen for the symptoms. The treatment provided no relief. Review of Systems   Constitutional:  Negative for activity change, fatigue and fever. HENT:  Negative for dental problem and sore throat. Eyes:  Negative for discharge and visual disturbance. Respiratory:  Negative for cough and shortness of breath. Cardiovascular:  Negative for chest pain, palpitations and leg swelling. Gastrointestinal:  Positive for abdominal pain (improving - known ulcer). Negative for constipation, diarrhea, nausea and vomiting. Genitourinary:  Negative for difficulty urinating and dysuria. Musculoskeletal:  Positive for arthralgias (right knee pain- acute onset) and back pain (chronic). Negative for myalgias. Skin:  Negative for rash and wound. Allergic/Immunologic: Negative for environmental allergies.    Neurological:  Negative for tingling, numbness and headaches. Hematological:  Does not bruise/bleed easily. Psychiatric/Behavioral:  Negative for agitation and sleep disturbance. Objective   Physical Exam  Vitals reviewed. Constitutional:       General: She is not in acute distress. Appearance: She is not toxic-appearing. Cardiovascular:      Rate and Rhythm: Normal rate and regular rhythm. Heart sounds: Normal heart sounds. No murmur heard. Pulmonary:      Effort: Pulmonary effort is normal. No respiratory distress. Breath sounds: Normal breath sounds. No wheezing. Abdominal:      General: Abdomen is protuberant. Bowel sounds are normal. There is no distension. Musculoskeletal:         General: No swelling, tenderness, deformity or signs of injury. Right lower leg: No edema. Left lower leg: No edema. Comments: Left knee exam normal.  Patient states it is a deep pain. No superficial tenderness. Skin:     General: Skin is warm and dry. Neurological:      Mental Status: She is alert and oriented to person, place, and time. Mental status is at baseline. Psychiatric:         Mood and Affect: Mood normal.         Behavior: Behavior normal.         Thought Content: Thought content normal.          On this date 10/19/2022 I have spent 32 minutes reviewing previous notes, test results and face to face with the patient discussing the diagnosis and importance of compliance with the treatment plan as well as documenting on the day of the visit. An electronic signature was used to authenticate this note.     --ANA LAURA Ignacio - CNP

## 2022-10-20 LAB
ESTIMATED AVERAGE GLUCOSE: 117 MG/DL
HBA1C MFR BLD: 5.7 % (ref 4–6)

## 2022-10-27 ENCOUNTER — TELEPHONE (OUTPATIENT)
Dept: FAMILY MEDICINE CLINIC | Age: 57
End: 2022-10-27

## 2022-10-27 NOTE — TELEPHONE ENCOUNTER
Pt aware of results, states you mentioned starting her on ozempic and wanted to know your thoughts about her starting it now that her levels have gone down.

## 2022-10-27 NOTE — TELEPHONE ENCOUNTER
I would be fine trying to order this for her. She needs to have an appt to start this. Please set aside sample for her, and schedule if interested.   Likely insurance will require prior auth, but think we might be able to get approved with her A1C still \"prediabetic\"

## 2022-12-09 DIAGNOSIS — F33.0 MILD EPISODE OF RECURRENT MAJOR DEPRESSIVE DISORDER (HCC): ICD-10-CM

## 2022-12-10 RX ORDER — CITALOPRAM 10 MG/1
10 TABLET ORAL DAILY
Qty: 90 TABLET | Refills: 1 | Status: SHIPPED | OUTPATIENT
Start: 2022-12-10 | End: 2023-12-10

## 2022-12-10 NOTE — TELEPHONE ENCOUNTER
Last visit: 10-19-22  Last Med refill: 9-14-22  Does patient have enough medication for 72 hours: Yes    Next Visit Date:  Future Appointments   Date Time Provider Zay Canas   12/14/2022 11:00 AM ANA LAURA Go CNP [facility] [address] Maintenance   Topic Date Due    COVID-19 Vaccine (2 - Booster for Naseem series) 07/13/2021    Flu vaccine (1) 08/01/2022    Depression Monitoring  03/22/2023    A1C test (Diabetic or Prediabetic)  10/19/2023    Lipids  10/19/2023    Breast cancer screen  06/22/2024    DTaP/Tdap/Td vaccine (3 - Td or Tdap) 11/03/2027    Colorectal Cancer Screen  09/29/2030    Shingles vaccine  Completed    Hepatitis C screen  Completed    HIV screen  Completed    Hepatitis A vaccine  Aged Out    Hib vaccine  Aged Out    Meningococcal (ACWY) vaccine  Aged Out    Pneumococcal 0-64 years Vaccine  Aged Out       Hemoglobin A1C (%)   Date Value   10/19/2022 5.7   12/14/2021 6.1 (H)   02/25/2020 6.1 (H)             ( goal A1C is < 7)   No results found for: LABMICR  LDL Cholesterol (mg/dL)   Date Value   10/19/2022 78   12/14/2021 96     LDL Calculated (mg/dL)   Date Value   08/06/2014 111   10/16/2013 120       (goal LDL is <100)   AST (U/L)   Date Value   08/10/2022 20     ALT (U/L)   Date Value   08/10/2022 18     BUN (mg/dL)   Date Value   08/10/2022 9     BP Readings from Last 3 Encounters:   10/19/22 108/62   08/10/22 108/78   03/22/22 138/80          (goal 120/80)    All Future Testing planned in [facility]  Lab Frequency Next Occurrence               Patient Active Problem List:     Hyperlipidemia     Thalassemia minor     Varicose veins     Lumbago     Vitamin D deficiency     Morbid obesity ([address]     Acquired trigger finger of left middle finger     Closed fracture of lateral malleolus     Fatigue     Fever     Fracture with nonunion     History of unsteady gait     Major depression, single episode     Osteopenia     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

## 2022-12-14 ENCOUNTER — OFFICE VISIT (OUTPATIENT)
Dept: FAMILY MEDICINE CLINIC | Age: 57
End: 2022-12-14
Payer: COMMERCIAL

## 2022-12-14 VITALS — WEIGHT: 263 LBS | BODY MASS INDEX: 49.65 KG/M2 | OXYGEN SATURATION: 95 % | HEART RATE: 62 BPM | HEIGHT: 61 IN

## 2022-12-14 DIAGNOSIS — R73.09 ELEVATED HEMOGLOBIN A1C: ICD-10-CM

## 2022-12-14 DIAGNOSIS — E66.01 MORBID OBESITY (HCC): Primary | ICD-10-CM

## 2022-12-14 DIAGNOSIS — E66.9 ABDOMINAL OBESITY AND METABOLIC SYNDROME: ICD-10-CM

## 2022-12-14 DIAGNOSIS — E88.81 ABDOMINAL OBESITY AND METABOLIC SYNDROME: ICD-10-CM

## 2022-12-14 DIAGNOSIS — R73.03 PRE-DIABETES: ICD-10-CM

## 2022-12-14 PROCEDURE — G8417 CALC BMI ABV UP PARAM F/U: HCPCS

## 2022-12-14 PROCEDURE — G8427 DOCREV CUR MEDS BY ELIG CLIN: HCPCS

## 2022-12-14 PROCEDURE — 3017F COLORECTAL CA SCREEN DOC REV: CPT

## 2022-12-14 PROCEDURE — 99213 OFFICE O/P EST LOW 20 MIN: CPT

## 2022-12-14 PROCEDURE — 1036F TOBACCO NON-USER: CPT

## 2022-12-14 PROCEDURE — G8484 FLU IMMUNIZE NO ADMIN: HCPCS

## 2022-12-14 RX ORDER — CLOTRIMAZOLE AND BETAMETHASONE DIPROPIONATE 10; .64 MG/G; MG/G
CREAM TOPICAL
Qty: 45 G | Refills: 1 | Status: SHIPPED | OUTPATIENT
Start: 2022-12-14

## 2022-12-14 RX ORDER — SEMAGLUTIDE 1.34 MG/ML
INJECTION, SOLUTION SUBCUTANEOUS
Qty: 1 ADJUSTABLE DOSE PRE-FILLED PEN SYRINGE | Refills: 1 | Status: SHIPPED | OUTPATIENT
Start: 2022-12-14 | End: 2023-02-08

## 2022-12-14 RX ORDER — NYSTATIN 100000 [USP'U]/G
POWDER TOPICAL
Qty: 30 G | Refills: 3 | Status: SHIPPED | OUTPATIENT
Start: 2022-12-14

## 2022-12-14 ASSESSMENT — PATIENT HEALTH QUESTIONNAIRE - PHQ9
SUM OF ALL RESPONSES TO PHQ QUESTIONS 1-9: 0
SUM OF ALL RESPONSES TO PHQ QUESTIONS 1-9: 0
1. LITTLE INTEREST OR PLEASURE IN DOING THINGS: 0
SUM OF ALL RESPONSES TO PHQ QUESTIONS 1-9: 0
SUM OF ALL RESPONSES TO PHQ9 QUESTIONS 1 & 2: 0
SUM OF ALL RESPONSES TO PHQ QUESTIONS 1-9: 0
2. FEELING DOWN, DEPRESSED OR HOPELESS: 0

## 2022-12-14 NOTE — PROGRESS NOTES
Lety Wu (:  1965) is a 62 y.o. female,Established patient, here for evaluation of the following chief complaint(s):  Discuss Medications (Ozempic)         ASSESSMENT/PLAN:  1. Morbid obesity ([address]  -     Semaglutide,0.25 or 0.5MG/DOS, (OZEMPIC, 0.25 OR 0.5 MG/DOSE,) 2 MG/1.5ML SOPN; Inject 0.25 mg into the skin every 7 days for 28 days, THEN 0.5 mg every 7 days for 28 days. , Disp-1 Adjustable Dose Pre-filled Pen Syringe, R-1Normal  2. Abdominal obesity and metabolic syndrome  -     YDMZYWHGRRL,7.18 or 0.5MG/DOS, (OZEMPIC, 0.25 OR 0.5 MG/DOSE,) 2 MG/1.5ML SOPN; Inject 0.25 mg into the skin every 7 days for 28 days, THEN 0.5 mg every 7 days for 28 days. , Disp-1 Adjustable Dose Pre-filled Pen Syringe, R-1Normal  3. Elevated hemoglobin A1c  -     Semaglutide,0.25 or 0.5MG/DOS, (OZEMPIC, 0.25 OR 0.5 MG/DOSE,) 2 MG/1.5ML SOPN; Inject 0.25 mg into the skin every 7 days for 28 days, THEN 0.5 mg every 7 days for 28 days. , Disp-1 Adjustable Dose Pre-filled Pen Syringe, R-1Normal  4. Pre-diabetes  -     Semaglutide,0.25 or 0.5MG/DOS, (OZEMPIC, 0.25 OR 0.5 MG/DOSE,) 2 MG/1.5ML SOPN; Inject 0.25 mg into the skin every 7 days for 28 days, THEN 0.5 mg every 7 days for 28 days. , Disp-1 Adjustable Dose Pre-filled Pen Syringe, R-1Normal    Patient excellent candidate for GLP1 for weight loss and prediabetes. Encouraged low fat diet and exercise  Patient to follow up in 6-8 weeks after starting medication. Call with any concerns or ill side effects prior to follow up    No follow-ups on file. Subjective   SUBJECTIVE/OBJECTIVE:  OBESITY / METABOLIC SYNDROME      Patient presents today for discussion elevated BMI. Patient reports history of weight gain and elevated BMI Most of pt adult life. Noting significant factors such as familial history of obesity. Today patients current BMI is Body mass index is 49.69 kg/m². Musa Rosenthal      Patient has significant increasing risk factors including Metabolic Syndrome including : Central Obesity, Prediabetes/Impaired fasting glucose, Hypertriglyceridemia, and Insulin Resistance. Labs testing completed with the last year include A1c, BMP/CMP, and CBC. Juan R Patella All within normal limits or treated effectively, A1C prediabetic range. Patient has tried traditional calorie reduction  and increased exercise, with mild sustained success. Patient reports treatment was mildly successful. Patient now expressing desire to try another medication. In addition, to medical management patient reports to plan in participation of dietary tracking with calorie reduction, dietary changes , low calorie diet, Low Carbohydrate diet, Low sugar diet, and daily exercise. Review of Systems   Constitutional:  Negative for activity change, fatigue and fever. HENT:  Negative for dental problem and sore throat. Eyes:  Negative for discharge and visual disturbance. Respiratory:  Negative for cough and shortness of breath. Cardiovascular:  Negative for chest pain, palpitations and leg swelling. Gastrointestinal:  Negative for abdominal pain, constipation, diarrhea, nausea and vomiting. Genitourinary:  Negative for difficulty urinating and dysuria. Musculoskeletal:  Positive for arthralgias (right knee pain- acute onset) and back pain (chronic). Negative for myalgias. Skin:  Negative for rash and wound. Allergic/Immunologic: Negative for environmental allergies. Neurological:  Negative for numbness and headaches. Hematological:  Does not bruise/bleed easily. Psychiatric/Behavioral:  Negative for agitation and sleep disturbance. Objective   Physical Exam  Vitals reviewed. Constitutional:       General: She is not in acute distress. Appearance: She is not toxic-appearing. Cardiovascular:      Rate and Rhythm: Normal rate and regular rhythm. Heart sounds: Normal heart sounds. No murmur heard. Pulmonary:      Effort: Pulmonary effort is normal. No respiratory distress. Breath sounds: Normal breath sounds. No wheezing. Skin:     General: Skin is warm and dry. Neurological:      Mental Status: She is alert and oriented to person, place, and time. Mental status is at baseline. Psychiatric:         Mood and Affect: Mood normal.         Behavior: Behavior normal.         Thought Content: Thought content normal.          On this date 12/14/2022 I have spent 28 minutes reviewing previous notes, test results and face to face with the patient discussing the diagnosis and importance of compliance with the treatment plan as well as documenting on the day of the visit. An electronic signature was used to authenticate this note.     --ANA LAURA Colvin - CNP

## 2022-12-28 ASSESSMENT — ENCOUNTER SYMPTOMS
SHORTNESS OF BREATH: 0
ABDOMINAL PAIN: 0
CONSTIPATION: 0
NAUSEA: 0
COUGH: 0
SORE THROAT: 0
DIARRHEA: 0
BACK PAIN: 1
VOMITING: 0
EYE DISCHARGE: 0

## 2023-02-03 ENCOUNTER — TELEPHONE (OUTPATIENT)
Dept: FAMILY MEDICINE CLINIC | Age: 58
End: 2023-02-03

## 2023-02-03 NOTE — TELEPHONE ENCOUNTER
Patient contacted the office today because she is having a regenerative treatment on her knees and needs to stop her Cholesterol medication 2 weeks before and then restart it 3 days after. Patient is asking if it would be a problem.

## 2023-02-03 NOTE — TELEPHONE ENCOUNTER
No problem. If she has any side effects when she restarts have her reach out. Sometimes doses need increased slowly.

## 2023-05-03 ENCOUNTER — TELEMEDICINE (OUTPATIENT)
Dept: FAMILY MEDICINE CLINIC | Age: 58
End: 2023-05-03
Payer: COMMERCIAL

## 2023-05-03 DIAGNOSIS — E66.01 MORBID OBESITY (HCC): Primary | ICD-10-CM

## 2023-05-03 PROCEDURE — 99214 OFFICE O/P EST MOD 30 MIN: CPT

## 2023-05-03 PROCEDURE — G8417 CALC BMI ABV UP PARAM F/U: HCPCS

## 2023-05-03 PROCEDURE — 3017F COLORECTAL CA SCREEN DOC REV: CPT

## 2023-05-03 PROCEDURE — G8427 DOCREV CUR MEDS BY ELIG CLIN: HCPCS

## 2023-05-03 PROCEDURE — 1036F TOBACCO NON-USER: CPT

## 2023-05-03 RX ORDER — SEMAGLUTIDE 0.25 MG/.5ML
0.25 INJECTION, SOLUTION SUBCUTANEOUS
Qty: 2 ML | Refills: 0 | COMMUNITY
Start: 2023-05-03 | End: 2023-05-31

## 2023-05-03 SDOH — ECONOMIC STABILITY: FOOD INSECURITY: WITHIN THE PAST 12 MONTHS, THE FOOD YOU BOUGHT JUST DIDN'T LAST AND YOU DIDN'T HAVE MONEY TO GET MORE.: NEVER TRUE

## 2023-05-03 SDOH — ECONOMIC STABILITY: HOUSING INSECURITY
IN THE LAST 12 MONTHS, WAS THERE A TIME WHEN YOU DID NOT HAVE A STEADY PLACE TO SLEEP OR SLEPT IN A SHELTER (INCLUDING NOW)?: NO

## 2023-05-03 SDOH — ECONOMIC STABILITY: INCOME INSECURITY: HOW HARD IS IT FOR YOU TO PAY FOR THE VERY BASICS LIKE FOOD, HOUSING, MEDICAL CARE, AND HEATING?: NOT HARD AT ALL

## 2023-05-03 SDOH — ECONOMIC STABILITY: FOOD INSECURITY: WITHIN THE PAST 12 MONTHS, YOU WORRIED THAT YOUR FOOD WOULD RUN OUT BEFORE YOU GOT MONEY TO BUY MORE.: NEVER TRUE

## 2023-05-03 ASSESSMENT — PATIENT HEALTH QUESTIONNAIRE - PHQ9
SUM OF ALL RESPONSES TO PHQ QUESTIONS 1-9: 1
SUM OF ALL RESPONSES TO PHQ QUESTIONS 1-9: 1
6. FEELING BAD ABOUT YOURSELF - OR THAT YOU ARE A FAILURE OR HAVE LET YOURSELF OR YOUR FAMILY DOWN: 0
1. LITTLE INTEREST OR PLEASURE IN DOING THINGS: 0
5. POOR APPETITE OR OVEREATING: 1
2. FEELING DOWN, DEPRESSED OR HOPELESS: 0
7. TROUBLE CONCENTRATING ON THINGS, SUCH AS READING THE NEWSPAPER OR WATCHING TELEVISION: 0
SUM OF ALL RESPONSES TO PHQ QUESTIONS 1-9: 1
8. MOVING OR SPEAKING SO SLOWLY THAT OTHER PEOPLE COULD HAVE NOTICED. OR THE OPPOSITE, BEING SO FIGETY OR RESTLESS THAT YOU HAVE BEEN MOVING AROUND A LOT MORE THAN USUAL: 0
9. THOUGHTS THAT YOU WOULD BE BETTER OFF DEAD, OR OF HURTING YOURSELF: 0
SUM OF ALL RESPONSES TO PHQ9 QUESTIONS 1 & 2: 0
10. IF YOU CHECKED OFF ANY PROBLEMS, HOW DIFFICULT HAVE THESE PROBLEMS MADE IT FOR YOU TO DO YOUR WORK, TAKE CARE OF THINGS AT HOME, OR GET ALONG WITH OTHER PEOPLE: 0
4. FEELING TIRED OR HAVING LITTLE ENERGY: 0
SUM OF ALL RESPONSES TO PHQ QUESTIONS 1-9: 1
3. TROUBLE FALLING OR STAYING ASLEEP: 0

## 2023-05-03 ASSESSMENT — ENCOUNTER SYMPTOMS: DIARRHEA: 1

## 2023-05-03 NOTE — PROGRESS NOTES
however more often with loose stools. Review of Systems   Gastrointestinal:  Positive for diarrhea (IBS flare ups). Musculoskeletal:  Positive for arthralgias (sees sports med). All other systems reviewed and are negative.        Objective   Patient-Reported Vitals  No data recorded     Physical Exam  [INSTRUCTIONS:  \"[x]\" Indicates a positive item  \"[]\" Indicates a negative item  -- DELETE ALL ITEMS NOT EXAMINED]    Constitutional: [x] Appears well-developed and well-nourished [x] No apparent distress      [] Abnormal -     Mental status: [x] Alert and awake  [x] Oriented to person/place/time [x] Able to follow commands    [] Abnormal -     Eyes:   EOM    [x]  Normal    [] Abnormal -   Sclera  [x]  Normal    [] Abnormal -          Discharge [x]  None visible   [] Abnormal -     HENT: [x] Normocephalic, atraumatic  [] Abnormal -   [x] Mouth/Throat: Mucous membranes are moist    External Ears [x] Normal  [] Abnormal -    Neck: [x] No visualized mass [] Abnormal -     Pulmonary/Chest: [x] Respiratory effort normal   [x] No visualized signs of difficulty breathing or respiratory distress        [] Abnormal -      Musculoskeletal:   [x] Normal gait with no signs of ataxia         [x] Normal range of motion of neck        [] Abnormal -     Neurological:        [x] No Facial Asymmetry (Cranial nerve 7 motor function) (limited exam due to video visit)          [x] No gaze palsy        [] Abnormal -          Skin:        [x] No significant exanthematous lesions or discoloration noted on facial skin         [] Abnormal -            Psychiatric:       [x] Normal Affect [] Abnormal -        [x] No Hallucinations    Other pertinent observable physical exam findings:-         On this date 5/3/2023 I have spent 20 minutes reviewing previous notes, test results and face to face (virtual) with the patient discussing the diagnosis and importance of compliance with the treatment plan as well as documenting on the day of the

## 2023-05-27 DIAGNOSIS — F33.0 MILD EPISODE OF RECURRENT MAJOR DEPRESSIVE DISORDER (HCC): ICD-10-CM

## 2023-05-31 RX ORDER — CITALOPRAM 10 MG/1
TABLET ORAL
Qty: 90 TABLET | Refills: 1 | Status: SHIPPED | OUTPATIENT
Start: 2023-05-31

## 2023-06-19 DIAGNOSIS — E66.01 MORBID OBESITY (HCC): ICD-10-CM

## 2023-06-19 RX ORDER — SEMAGLUTIDE 0.25 MG/.5ML
0.25 INJECTION, SOLUTION SUBCUTANEOUS
Qty: 2 ML | Refills: 0 | OUTPATIENT
Start: 2023-06-19 | End: 2023-07-17

## 2023-06-19 NOTE — TELEPHONE ENCOUNTER
LOV 05/03/2023   RTO   LRF 05/03/2023    Patient has done well on the .5mg. Patient is inquiring what the next dose should be          Controlled Substance Monitoring:    Acute and Chronic Pain Monitoring:   RX Monitoring 12/7/2017   Attestation The Prescription Monitoring Report for this patient was reviewed today. Periodic Controlled Substance Monitoring No signs of potential drug abuse or diversion identified.

## 2023-06-21 ENCOUNTER — TELEPHONE (OUTPATIENT)
Dept: FAMILY MEDICINE CLINIC | Age: 58
End: 2023-06-21

## 2023-06-21 NOTE — TELEPHONE ENCOUNTER
Patient requesting semaglutide injection refill- next dose per [employer]. Script awaiting in office physician review. No side effects to 0.6/0.5 mL    Form will be faxed to [employer] once complete. Patient requesting call back with update. Ok'd detailed message on VM.

## 2023-06-22 NOTE — TELEPHONE ENCOUNTER
Paperwork was faxed, and now scanned. Thank you. joiz message sent to pt to schedule appt. Will call later if pt does not see message.

## 2023-06-22 NOTE — TELEPHONE ENCOUNTER
I had given Lisandro Ribeiro the go ahead to complete paper request to 1.2mg dose to [facility], and I believe she had Dr Priscila Venegas (thank you) complete this. Please confirm it was, as I do not see anything scanned into media. Also, please advise patient she needs to schedule in office follow up in 1 month for weight, documentation, and continuation of medication.   Thank you

## 2023-09-30 ENCOUNTER — OFFICE VISIT (OUTPATIENT)
Dept: PRIMARY CARE CLINIC | Age: 58
End: 2023-09-30
Payer: COMMERCIAL

## 2023-09-30 VITALS
DIASTOLIC BLOOD PRESSURE: 82 MMHG | TEMPERATURE: 98.6 F | SYSTOLIC BLOOD PRESSURE: 128 MMHG | OXYGEN SATURATION: 97 % | WEIGHT: 270 LBS | HEART RATE: 72 BPM | BODY MASS INDEX: 51.02 KG/M2

## 2023-09-30 DIAGNOSIS — M65.4 DE QUERVAIN'S TENOSYNOVITIS, RIGHT: Primary | ICD-10-CM

## 2023-09-30 PROCEDURE — 1036F TOBACCO NON-USER: CPT | Performed by: PHYSICIAN ASSISTANT

## 2023-09-30 PROCEDURE — 3017F COLORECTAL CA SCREEN DOC REV: CPT | Performed by: PHYSICIAN ASSISTANT

## 2023-09-30 PROCEDURE — G8417 CALC BMI ABV UP PARAM F/U: HCPCS | Performed by: PHYSICIAN ASSISTANT

## 2023-09-30 PROCEDURE — 99213 OFFICE O/P EST LOW 20 MIN: CPT | Performed by: PHYSICIAN ASSISTANT

## 2023-09-30 PROCEDURE — G8427 DOCREV CUR MEDS BY ELIG CLIN: HCPCS | Performed by: PHYSICIAN ASSISTANT

## 2023-09-30 RX ORDER — PREDNISONE 20 MG/1
20 TABLET ORAL 2 TIMES DAILY
Qty: 10 TABLET | Refills: 0 | Status: SHIPPED | OUTPATIENT
Start: 2023-09-30 | End: 2023-10-05

## 2023-09-30 ASSESSMENT — ENCOUNTER SYMPTOMS
RESPIRATORY NEGATIVE: 1
GASTROINTESTINAL NEGATIVE: 1

## 2023-09-30 NOTE — PROGRESS NOTES
[facility]  [address]  Phone: [phone]  Fax: [phone] [facility]    Pt Name: Betty Riggs  MRN: [id number]  [facility] 1965  Date of evaluation: 9/30/2023  Provider: Anu Wilson PA-C     CHIEF COMPLAINT       Chief Complaint   Patient presents with    Hand Pain     Right hand pain for 3 weeks. HISTORY OF PRESENT ILLNESS  (Location/Symptom, Timing/Onset, Context/Setting, Quality, Duration, Modifying Factors, Severity.)   Betty Riggs is a 62 y.o. White (non-) [1] female who presents to the office for evaluation of      Wrist Pain   The pain is present in the right wrist and right hand. This is a new problem. The current episode started 1 to 4 weeks ago. There has been no history of extremity trauma. The problem has been waxing and waning. The quality of the pain is described as aching and sharp. Pertinent negatives include no fever, numbness, stiffness or tingling. The symptoms are aggravated by activity. Nursing Notes were reviewed. REVIEW OF SYSTEMS    (2-9 systems for level 4, 10 or more for level 5)     Review of Systems   Constitutional:  Negative for diaphoresis, fatigue and fever. HENT: Negative. Respiratory: Negative. Cardiovascular: Negative. Gastrointestinal: Negative. Musculoskeletal:  Negative for stiffness. Right wrist/right thumb pain   Skin: Negative. Neurological:  Negative for tingling and numbness. Except as noted above the remainder of the review of systems was reviewed andnegative. PAST MEDICAL HISTORY   History reviewed.     Past Medical History:   Diagnosis Date    Back pain     RATE 10    Beta thalassemia minor     Depression     MILD    Diverticulitis     past    E-coli UTI 2010    Fall     AT HOME 3 WEEKS AGO    Gastric ulcer     resolved    GERD (gastroesophageal reflux disease)     resolved    Hyperlipidemia     Obesity     PONV

## 2023-10-07 DIAGNOSIS — E78.2 MIXED HYPERLIPIDEMIA: ICD-10-CM

## 2023-10-09 NOTE — TELEPHONE ENCOUNTER
LOV 5/3/2023     RTO n/a  LRF 10/19/2022          Controlled Substance Monitoring:    Acute and Chronic Pain Monitoring:   RX Monitoring Attestation Periodic Controlled Substance Monitoring   12/7/2017  10:27 AM The Prescription Monitoring Report for this patient was reviewed today. No signs of potential drug abuse or diversion identified.

## 2023-10-11 RX ORDER — ATORVASTATIN CALCIUM 20 MG/1
20 TABLET, FILM COATED ORAL DAILY
Qty: 90 TABLET | Refills: 3 | Status: SHIPPED | OUTPATIENT
Start: 2023-10-11

## 2023-10-24 RX ORDER — CLOTRIMAZOLE AND BETAMETHASONE DIPROPIONATE 10; .64 MG/G; MG/G
CREAM TOPICAL
Qty: 45 G | Refills: 5 | Status: SHIPPED | OUTPATIENT
Start: 2023-10-24

## 2023-10-24 NOTE — TELEPHONE ENCOUNTER
LOV: 5/3/2023    RTO: No future appointments. LRF: 12/14/22          Controlled Substance Monitoring:    Acute and Chronic Pain Monitoring:   RX Monitoring Attestation Periodic Controlled Substance Monitoring   12/7/2017  10:27 AM The Prescription Monitoring Report for this patient was reviewed today. No signs of potential drug abuse or diversion identified.

## 2023-11-17 ENCOUNTER — HOSPITAL ENCOUNTER (OUTPATIENT)
Age: 58
Setting detail: SPECIMEN
Discharge: HOME | End: 2023-11-17

## 2023-11-17 ENCOUNTER — OFFICE VISIT (OUTPATIENT)
Dept: PRIMARY CARE CLINIC | Age: 58
End: 2023-11-17
Payer: COMMERCIAL

## 2023-11-17 VITALS
OXYGEN SATURATION: 98 % | SYSTOLIC BLOOD PRESSURE: 136 MMHG | DIASTOLIC BLOOD PRESSURE: 78 MMHG | TEMPERATURE: 98.1 F | WEIGHT: 273 LBS | HEART RATE: 70 BPM | BODY MASS INDEX: 51.58 KG/M2

## 2023-11-17 DIAGNOSIS — R49.0 HOARSE: ICD-10-CM

## 2023-11-17 DIAGNOSIS — R52 BODY ACHES: ICD-10-CM

## 2023-11-17 DIAGNOSIS — R53.83 FATIGUE, UNSPECIFIED TYPE: ICD-10-CM

## 2023-11-17 DIAGNOSIS — R49.0 HOARSE: Primary | ICD-10-CM

## 2023-11-17 PROCEDURE — 3017F COLORECTAL CA SCREEN DOC REV: CPT | Performed by: PHYSICIAN ASSISTANT

## 2023-11-17 PROCEDURE — 1036F TOBACCO NON-USER: CPT | Performed by: PHYSICIAN ASSISTANT

## 2023-11-17 PROCEDURE — 99213 OFFICE O/P EST LOW 20 MIN: CPT | Performed by: PHYSICIAN ASSISTANT

## 2023-11-17 PROCEDURE — G8417 CALC BMI ABV UP PARAM F/U: HCPCS | Performed by: PHYSICIAN ASSISTANT

## 2023-11-17 PROCEDURE — G8427 DOCREV CUR MEDS BY ELIG CLIN: HCPCS | Performed by: PHYSICIAN ASSISTANT

## 2023-11-17 PROCEDURE — G8484 FLU IMMUNIZE NO ADMIN: HCPCS | Performed by: PHYSICIAN ASSISTANT

## 2023-11-17 ASSESSMENT — ENCOUNTER SYMPTOMS
COUGH: 1
WHEEZING: 0
DIARRHEA: 0
NAUSEA: 0
SINUS PRESSURE: 0
RHINORRHEA: 0
SORE THROAT: 0
EYES NEGATIVE: 1
SHORTNESS OF BREATH: 0
SINUS PAIN: 0
VOMITING: 0

## 2023-11-17 NOTE — PROGRESS NOTES
[facility]  [address] [facility] [address]  Phone: [phone]  Fax: [phone] [facility]    Pt Name: Mandi Marc  MRN: [id number]  [address] 1965  Date of evaluation: 11/17/2023  Provider: [facility]       Chief Complaint   Patient presents with    Hoarse     Hoarse, headache, body aches, chills, and fatigue started 11/12. Denies ear pain, sore throat, nausea, vomiting, diarrhea, sob, or chest pain. HISTORY OF PRESENT ILLNESS  (Location/Symptom, Timing/Onset, Context/Setting, Quality, Duration, Modifying Factors, Severity.)   Mandi Marc is a 62 y.o. White (non-) [1] female who presents to the office for evaluation of      Fatigue  This is a new problem. Associated symptoms include congestion, coughing, fatigue, headaches and myalgias. Pertinent negatives include no nausea, sore throat or vomiting. She has tried acetaminophen for the symptoms. Nursing Notes were reviewed. REVIEW OF SYSTEMS    (2-9 systems for level 4, 10 or more for level 5)     Review of Systems   Constitutional:  Positive for fatigue. HENT:  Positive for congestion. Negative for ear discharge, ear pain, postnasal drip, rhinorrhea, sinus pressure, sinus pain and sore throat. Eyes: Negative. Respiratory:  Positive for cough. Negative for shortness of breath and wheezing. Cardiovascular: Negative. Gastrointestinal:  Negative for diarrhea, nausea and vomiting. Genitourinary: Negative. Musculoskeletal:  Positive for myalgias. Skin: Negative. Neurological:  Positive for headaches. Except as noted above the remainder of the review of systems was reviewed andnegative. PAST MEDICAL HISTORY   History reviewed.     Past Medical History:   Diagnosis Date    Back pain     RATE 10    Beta thalassemia minor     Depression     MILD    Diverticulitis     past    E-coli UTI 2010    Fall     AT HOME 3 WEEKS AGO

## 2023-11-18 LAB
SARS-COV-2 RNA RESP QL NAA+PROBE: NORMAL
SARS-COV-2 RNA RESP QL NAA+PROBE: NOT DETECTED
SOURCE: NORMAL

## 2023-11-20 ENCOUNTER — OFFICE VISIT (OUTPATIENT)
Dept: PRIMARY CARE CLINIC | Age: 58
End: 2023-11-20
Payer: COMMERCIAL

## 2023-11-20 VITALS
BODY MASS INDEX: 51.58 KG/M2 | OXYGEN SATURATION: 98 % | TEMPERATURE: 97.6 F | DIASTOLIC BLOOD PRESSURE: 88 MMHG | WEIGHT: 273 LBS | SYSTOLIC BLOOD PRESSURE: 126 MMHG | HEART RATE: 74 BPM

## 2023-11-20 DIAGNOSIS — J32.9 BACTERIAL SINUSITIS: Primary | ICD-10-CM

## 2023-11-20 DIAGNOSIS — B96.89 BACTERIAL SINUSITIS: Primary | ICD-10-CM

## 2023-11-20 PROCEDURE — G8484 FLU IMMUNIZE NO ADMIN: HCPCS | Performed by: PHYSICIAN ASSISTANT

## 2023-11-20 PROCEDURE — 3017F COLORECTAL CA SCREEN DOC REV: CPT | Performed by: PHYSICIAN ASSISTANT

## 2023-11-20 PROCEDURE — G8427 DOCREV CUR MEDS BY ELIG CLIN: HCPCS | Performed by: PHYSICIAN ASSISTANT

## 2023-11-20 PROCEDURE — 99213 OFFICE O/P EST LOW 20 MIN: CPT | Performed by: PHYSICIAN ASSISTANT

## 2023-11-20 PROCEDURE — 1036F TOBACCO NON-USER: CPT | Performed by: PHYSICIAN ASSISTANT

## 2023-11-20 PROCEDURE — G8417 CALC BMI ABV UP PARAM F/U: HCPCS | Performed by: PHYSICIAN ASSISTANT

## 2023-11-20 RX ORDER — DOXYCYCLINE HYCLATE 100 MG
100 TABLET ORAL 2 TIMES DAILY
Qty: 20 TABLET | Refills: 0 | Status: SHIPPED | OUTPATIENT
Start: 2023-11-20 | End: 2023-11-30

## 2023-11-20 RX ORDER — FLUTICASONE PROPIONATE 50 MCG
1 SPRAY, SUSPENSION (ML) NASAL DAILY
Qty: 16 G | Refills: 0 | Status: SHIPPED | OUTPATIENT
Start: 2023-11-20

## 2023-11-20 RX ORDER — PREDNISONE 20 MG/1
20 TABLET ORAL 2 TIMES DAILY
Qty: 10 TABLET | Refills: 0 | Status: SHIPPED | OUTPATIENT
Start: 2023-11-20 | End: 2023-11-25

## 2023-11-22 ENCOUNTER — TELEPHONE (OUTPATIENT)
Dept: PRIMARY CARE CLINIC | Age: 58
End: 2023-11-22

## 2023-11-22 ASSESSMENT — ENCOUNTER SYMPTOMS
GASTROINTESTINAL NEGATIVE: 1
COUGH: 1
SINUS PRESSURE: 1
EYES NEGATIVE: 1
SINUS PAIN: 1

## 2023-11-22 NOTE — TELEPHONE ENCOUNTER
Per Colman Habermann, patient needs to continue medications as prescribed. Pt aware and verbalized her understanding.

## 2023-11-22 NOTE — TELEPHONE ENCOUNTER
Pt states that she is not feeling any better. She was seen on Friday and Monday. Started on Doxycycline on Monday. She is also taking prednisone, sudafed, mucinex, tylenol and using flonase. She is wondering if you can give her something else? Her biggest complaint is the congestion and pressure in her head.

## 2024-01-10 ENCOUNTER — HOSPITAL ENCOUNTER (OUTPATIENT)
Age: 59
Setting detail: SPECIMEN
Discharge: HOME | End: 2024-01-10

## 2024-01-10 ENCOUNTER — OFFICE VISIT (OUTPATIENT)
Dept: PRIMARY CARE CLINIC | Age: 59
End: 2024-01-10

## 2024-01-10 VITALS
DIASTOLIC BLOOD PRESSURE: 78 MMHG | TEMPERATURE: 99.4 F | OXYGEN SATURATION: 95 % | WEIGHT: 273 LBS | SYSTOLIC BLOOD PRESSURE: 130 MMHG | HEART RATE: 87 BPM | BODY MASS INDEX: 51.58 KG/M2

## 2024-01-10 DIAGNOSIS — R68.83 CHILLS: ICD-10-CM

## 2024-01-10 DIAGNOSIS — R50.9 FEVER, UNSPECIFIED FEVER CAUSE: ICD-10-CM

## 2024-01-10 DIAGNOSIS — R05.1 ACUTE COUGH: Primary | ICD-10-CM

## 2024-01-10 DIAGNOSIS — R05.1 ACUTE COUGH: ICD-10-CM

## 2024-01-10 DIAGNOSIS — R52 GENERALIZED BODY ACHES: Primary | ICD-10-CM

## 2024-01-10 LAB
INFLUENZA A ANTIBODY: NORMAL
INFLUENZA B ANTIBODY: NORMAL

## 2024-01-10 RX ORDER — BROMPHENIRAMINE MALEATE, PSEUDOEPHEDRINE HYDROCHLORIDE, AND DEXTROMETHORPHAN HYDROBROMIDE 2; 30; 10 MG/5ML; MG/5ML; MG/5ML
SYRUP ORAL
Qty: 180 ML | Refills: 0 | Status: SHIPPED | OUTPATIENT
Start: 2024-01-10

## 2024-01-10 RX ORDER — BROMPHENIRAMINE MALEATE, PSEUDOEPHEDRINE HYDROCHLORIDE, AND DEXTROMETHORPHAN HYDROBROMIDE 2; 30; 10 MG/5ML; MG/5ML; MG/5ML
SYRUP ORAL
Qty: 180 ML | Refills: 0 | Status: SHIPPED | OUTPATIENT
Start: 2024-01-10 | End: 2024-01-10 | Stop reason: SDUPTHER

## 2024-01-10 ASSESSMENT — ENCOUNTER SYMPTOMS
DIARRHEA: 0
EYES NEGATIVE: 1
VOMITING: 0
SINUS PRESSURE: 1
SINUS PAIN: 0
COUGH: 1
SORE THROAT: 0
WHEEZING: 1
SHORTNESS OF BREATH: 1
NAUSEA: 1

## 2024-01-10 NOTE — TELEPHONE ENCOUNTER
Pt states that the pharmacy does not have the Bromphed- pt is wondering if she could get it called in to [facility]- or have something else?

## 2024-01-10 NOTE — PROGRESS NOTES
[facility]  [address] [place] [address]  Phone: [phone]  Fax: [phone]       [facility]    Pt Name: Luz Dhaliwal  MRN: [id number]  Birthdate 1965  Date of evaluation: 1/10/2024  Provider: Nella Andrea PA-C     CHIEF COMPLAINT       Chief Complaint   Patient presents with    Cough     fever/cough/fatigue/chills/bodyaches Covid test neg yesterday. Sx started Sunday.           HISTORY OF PRESENT ILLNESS  (Location/Symptom, Timing/Onset, Context/Setting, Quality, Duration, Modifying Factors, Severity.)   Luz Dhaliwal is a 58 y.o. White (non-) [1] female who presents to the office for evaluation of      Cough  The current episode started in the past 7 days. The cough is Non-productive. Associated symptoms include chills, a fever, headaches, myalgias, nasal congestion, postnasal drip, shortness of breath and wheezing. Pertinent negatives include no ear congestion, ear pain or sore throat. Treatments tried: tylenol. Her past medical history is significant for bronchitis, emphysema and pneumonia. There is no history of asthma or COPD.       Nursing Notes were reviewed.    REVIEW OF SYSTEMS    (2-9 systems for level 4, 10 or more for level 5)     Review of Systems   Constitutional:  Positive for chills, fatigue and fever.   HENT:  Positive for congestion, postnasal drip and sinus pressure. Negative for ear pain, sinus pain and sore throat.    Eyes: Negative.    Respiratory:  Positive for cough, shortness of breath and wheezing.    Cardiovascular: Negative.    Gastrointestinal:  Positive for nausea. Negative for diarrhea and vomiting.   Genitourinary: Negative.    Musculoskeletal:  Positive for myalgias.   Neurological:  Positive for headaches.         Except as noted above the remainder of the review of systems was reviewed andnegative.       PAST MEDICAL HISTORY   History reviewed.    Past Medical History:   Diagnosis Date    Back pain     RATE 10    Beta

## 2024-01-11 RX ORDER — PREDNISONE 20 MG/1
20 TABLET ORAL 2 TIMES DAILY
Qty: 10 TABLET | Refills: 0 | Status: SHIPPED | OUTPATIENT
Start: 2024-01-11 | End: 2024-01-16

## 2024-01-12 ENCOUNTER — TELEPHONE (OUTPATIENT)
Dept: FAMILY MEDICINE CLINIC | Age: 59
End: 2024-01-12

## 2024-01-12 NOTE — TELEPHONE ENCOUNTER
----- Message from Luz Dhaliwal sent at 1/12/2024 12:30 PM EST -----  Regarding: Work note  Contact: [phone]  Can I please get a work note for today

## 2024-03-06 ENCOUNTER — TELEPHONE (OUTPATIENT)
Dept: PRIMARY CARE CLINIC | Age: 59
End: 2024-03-06

## 2024-03-06 NOTE — TELEPHONE ENCOUNTER
Sent notifications x 2 for patient to connect to video visit. Called mobile phone and left voicemail ( she identified herself). To see if patient wanted to complete the video visit.

## 2024-03-08 ENCOUNTER — OFFICE VISIT (OUTPATIENT)
Dept: FAMILY MEDICINE CLINIC | Age: 59
End: 2024-03-08
Payer: COMMERCIAL

## 2024-03-08 VITALS
HEIGHT: 61 IN | WEIGHT: 288 LBS | BODY MASS INDEX: 54.37 KG/M2 | TEMPERATURE: 98.7 F | OXYGEN SATURATION: 97 % | HEART RATE: 72 BPM | DIASTOLIC BLOOD PRESSURE: 74 MMHG | SYSTOLIC BLOOD PRESSURE: 122 MMHG

## 2024-03-08 DIAGNOSIS — R60.0 BILATERAL LEG EDEMA: Primary | ICD-10-CM

## 2024-03-08 PROCEDURE — G8484 FLU IMMUNIZE NO ADMIN: HCPCS

## 2024-03-08 PROCEDURE — 1036F TOBACCO NON-USER: CPT

## 2024-03-08 PROCEDURE — G8417 CALC BMI ABV UP PARAM F/U: HCPCS

## 2024-03-08 PROCEDURE — G8427 DOCREV CUR MEDS BY ELIG CLIN: HCPCS

## 2024-03-08 PROCEDURE — 3017F COLORECTAL CA SCREEN DOC REV: CPT

## 2024-03-08 PROCEDURE — 99213 OFFICE O/P EST LOW 20 MIN: CPT

## 2024-03-08 RX ORDER — FUROSEMIDE 20 MG/1
20 TABLET ORAL DAILY
Qty: 10 TABLET | Refills: 0 | Status: SHIPPED | OUTPATIENT
Start: 2024-03-08 | End: 2024-03-18

## 2024-03-08 ASSESSMENT — ENCOUNTER SYMPTOMS
CONSTIPATION: 0
DIARRHEA: 0
WHEEZING: 0
COUGH: 0
SHORTNESS OF BREATH: 0

## 2024-03-08 ASSESSMENT — PATIENT HEALTH QUESTIONNAIRE - PHQ9
SUM OF ALL RESPONSES TO PHQ QUESTIONS 1-9: 0
SUM OF ALL RESPONSES TO PHQ QUESTIONS 1-9: 0
9. THOUGHTS THAT YOU WOULD BE BETTER OFF DEAD, OR OF HURTING YOURSELF: 0
SUM OF ALL RESPONSES TO PHQ QUESTIONS 1-9: 0
1. LITTLE INTEREST OR PLEASURE IN DOING THINGS: 0
SUM OF ALL RESPONSES TO PHQ QUESTIONS 1-9: 0
10. IF YOU CHECKED OFF ANY PROBLEMS, HOW DIFFICULT HAVE THESE PROBLEMS MADE IT FOR YOU TO DO YOUR WORK, TAKE CARE OF THINGS AT HOME, OR GET ALONG WITH OTHER PEOPLE: 0
8. MOVING OR SPEAKING SO SLOWLY THAT OTHER PEOPLE COULD HAVE NOTICED. OR THE OPPOSITE, BEING SO FIGETY OR RESTLESS THAT YOU HAVE BEEN MOVING AROUND A LOT MORE THAN USUAL: 0
SUM OF ALL RESPONSES TO PHQ9 QUESTIONS 1 & 2: 0
5. POOR APPETITE OR OVEREATING: 0
7. TROUBLE CONCENTRATING ON THINGS, SUCH AS READING THE NEWSPAPER OR WATCHING TELEVISION: 0
4. FEELING TIRED OR HAVING LITTLE ENERGY: 0
3. TROUBLE FALLING OR STAYING ASLEEP: 0
2. FEELING DOWN, DEPRESSED OR HOPELESS: 0
6. FEELING BAD ABOUT YOURSELF - OR THAT YOU ARE A FAILURE OR HAVE LET YOURSELF OR YOUR FAMILY DOWN: 0

## 2024-03-08 NOTE — PROGRESS NOTES
COVID-19.  A negative result does not rule out COVID-19.        Mag SARS-CoV-2 for use on the Mag 6800/8800 Systems is a real-time RT-PCR test intended   for the qualitative detection of nucleic acids from SARS-CoV-2  in clinician-collected nasal, nasopharyngeal, and oropharyngeal swab specimens from   individuals who meet COVID-19 clinical and/or epidemiological criteria.  Mag SARS-CoV-2 is for use only under Emergency Use Authorization (EUA) in laboratories   certified under Clinical Laboratory Improvement Amendments of 1988 (CLIA), 42 U.S.C. §263a,   that meet requirements to perform high or moderate complexity tests.        An individual without symptoms of COVID-19 and who is not shedding SARS-CoV-2 virus would   expect to have a negative (not detected) result in this assay.  Fact sheet for Healthcare Providers: https://www.fda.gov/media/074467/download  Fact sheet for Patients: https://www.                           fda.gov/media/845581/download        METHODOLOGY: RT-PCR          ASSESSMENT & ORDERS      Luz was seen today for leg swelling.    Diagnoses and all orders for this visit:    Bilateral leg edema  -     Comprehensive Metabolic Panel; Future  -     Brain Natriuretic Peptide; Future  -     furosemide (LASIX) 20 MG tablet; Take 1 tablet by mouth daily for 10 days       PLAN:   Here for bilateral leg edema.   Start Lasix 20 mg once a day consistently for 10 days  Discussed wearing compression stockings daily, raising legs when applicable, avoiding salt in diet, ensuring adequate protein in diet.  Update labs   Please reach out if continues to not improve or worsen    Return if symptoms worsen or fail to improve.         Electronically signed by ANA LAURA Urias CNP on 3/8/2024 at 4:08 PM

## 2024-03-09 DIAGNOSIS — R60.0 BILATERAL LEG EDEMA: ICD-10-CM

## 2024-03-11 RX ORDER — FUROSEMIDE 20 MG/1
TABLET ORAL
Qty: 10 TABLET | Refills: 0 | OUTPATIENT
Start: 2024-03-11

## 2024-07-23 ENCOUNTER — TELEMEDICINE (OUTPATIENT)
Dept: FAMILY MEDICINE CLINIC | Age: 59
End: 2024-07-23
Payer: COMMERCIAL

## 2024-07-23 DIAGNOSIS — T75.3XXA MOTION SICKNESS, INITIAL ENCOUNTER: Primary | ICD-10-CM

## 2024-07-23 PROCEDURE — 1036F TOBACCO NON-USER: CPT

## 2024-07-23 PROCEDURE — G8417 CALC BMI ABV UP PARAM F/U: HCPCS

## 2024-07-23 PROCEDURE — G8427 DOCREV CUR MEDS BY ELIG CLIN: HCPCS

## 2024-07-23 PROCEDURE — 3017F COLORECTAL CA SCREEN DOC REV: CPT

## 2024-07-23 PROCEDURE — 99213 OFFICE O/P EST LOW 20 MIN: CPT

## 2024-07-23 RX ORDER — SCOLOPAMINE TRANSDERMAL SYSTEM 1 MG/1
1 PATCH, EXTENDED RELEASE TRANSDERMAL
Qty: 5 PATCH | Refills: 0 | Status: SHIPPED | OUTPATIENT
Start: 2024-07-23 | End: 2024-08-07

## 2024-07-23 RX ORDER — ONDANSETRON 4 MG/1
4 TABLET, ORALLY DISINTEGRATING ORAL 3 TIMES DAILY PRN
Qty: 15 TABLET | Refills: 0 | Status: SHIPPED | OUTPATIENT
Start: 2024-07-23

## 2024-07-23 SDOH — ECONOMIC STABILITY: INCOME INSECURITY: HOW HARD IS IT FOR YOU TO PAY FOR THE VERY BASICS LIKE FOOD, HOUSING, MEDICAL CARE, AND HEATING?: NOT HARD AT ALL

## 2024-07-23 SDOH — ECONOMIC STABILITY: FOOD INSECURITY: WITHIN THE PAST 12 MONTHS, THE FOOD YOU BOUGHT JUST DIDN'T LAST AND YOU DIDN'T HAVE MONEY TO GET MORE.: NEVER TRUE

## 2024-07-23 SDOH — ECONOMIC STABILITY: FOOD INSECURITY: WITHIN THE PAST 12 MONTHS, YOU WORRIED THAT YOUR FOOD WOULD RUN OUT BEFORE YOU GOT MONEY TO BUY MORE.: NEVER TRUE

## 2024-07-23 NOTE — PROGRESS NOTES
Luz Dhaliwal, was evaluated through a synchronous (real-time) audio-video encounter. The patient (or guardian if applicable) is aware that this is a billable service, which includes applicable co-pays. This Virtual Visit was conducted with patient's (and/or legal guardian's) consent. Patient identification was verified, and a caregiver was present when appropriate.   The patient was located at Home: [address] [place] [id number]  Provider was located at Facility (Appt Dept): [address] [facility] [place] [address]  Confirm you are appropriately licensed, registered, or certified to deliver care in the state where the patient is located as indicated above. If you are not or unsure, please re-schedule the visit: Yes, I confirm.     Luz Dhaliwal (:  1965) is a Established patient, presenting virtually for evaluation of the following:    Assessment & Plan   Below is the assessment and plan developed based on review of pertinent history, physical exam, labs, studies, and medications.  1. Motion sickness, initial encounter  -     scopolamine (TRANSDERM-SCOP) transdermal patch; Place 1 patch onto the skin every 72 hours for 15 days, Disp-5 patch, R-0Normal  -     ondansetron (ZOFRAN-ODT) 4 MG disintegrating tablet; Take 1 tablet by mouth 3 times daily as needed for Nausea or Vomiting, Disp-15 tablet, R-0Normal    Medication as prescribed for her cruise  Advised patient she needs to follow-up for her chronic conditions some routine labs.    No follow-ups on file.       Subjective   Patient presents via virtual visit today to request medication for motion sickness.  She is traveling on a cruise for 14 days, and would like to be proactive.  She has had some mild motion sickness in the past.      Review of Systems   Constitutional:  Negative for activity change, fatigue and fever.   HENT:  Negative for dental problem and sore throat.    Eyes:  Negative for discharge and visual disturbance.

## 2024-07-29 ASSESSMENT — ENCOUNTER SYMPTOMS
DIARRHEA: 0
NAUSEA: 0
CONSTIPATION: 0
COUGH: 0
EYE DISCHARGE: 0
SHORTNESS OF BREATH: 0
VOMITING: 0
SORE THROAT: 0

## 2024-08-07 DIAGNOSIS — F33.0 MILD EPISODE OF RECURRENT MAJOR DEPRESSIVE DISORDER (HCC): ICD-10-CM

## 2024-08-07 NOTE — TELEPHONE ENCOUNTER
LOV   LRF 5/31/24 Previous Pharmacy was [facility]    Health Maintenance   Topic Date Due    Hepatitis B vaccine (1 of 3 - 3-dose series) Never done    COVID-19 Vaccine (2 - 2023-24 season) 09/01/2023    A1C test (Diabetic or Prediabetic)  10/19/2023    Lipids  10/19/2023    Breast cancer screen  06/22/2024    Flu vaccine (1) 08/01/2024    Depression Monitoring  03/08/2025    DTaP/Tdap/Td vaccine (3 - Td or Tdap) 11/03/2027    Colorectal Cancer Screen  09/29/2030    Shingles vaccine  Completed    Hepatitis C screen  Completed    HIV screen  Completed    Hepatitis A vaccine  Aged Out    Hib vaccine  Aged Out    Polio vaccine  Aged Out    Meningococcal (ACWY) vaccine  Aged Out    Pneumococcal 0-64 years Vaccine  Aged Out             (applicable per patient's age: Cancer Screenings, Depression Screening, Fall Risk Screening, Immunizations)    Hemoglobin A1C (%)   Date Value   10/19/2022 5.7   12/14/2021 6.1 (H)   02/25/2020 6.1 (H)     AST (U/L)   Date Value   03/08/2024 29     ALT (U/L)   Date Value   03/08/2024 43     BUN (mg/dL)   Date Value   03/08/2024 15      (goal A1C is < 7)   (goal LDL is <100) need 30-50% reduction from baseline     BP Readings from Last 3 Encounters:   03/08/24 122/74   01/10/24 130/78   11/20/23 126/88    (goal /80)      All Future Testing planned in [facility]:  Lab Frequency Next Occurrence       Next Visit Date:  No future appointments.         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

## 2024-08-09 RX ORDER — CITALOPRAM HYDROBROMIDE 10 MG/1
10 TABLET ORAL DAILY
Qty: 90 TABLET | Refills: 0 | Status: SHIPPED | OUTPATIENT
Start: 2024-08-09

## 2024-09-30 ENCOUNTER — OFFICE VISIT (OUTPATIENT)
Dept: PRIMARY CARE CLINIC | Age: 59
End: 2024-09-30
Payer: COMMERCIAL

## 2024-09-30 VITALS
HEART RATE: 74 BPM | SYSTOLIC BLOOD PRESSURE: 130 MMHG | WEIGHT: 293 LBS | OXYGEN SATURATION: 96 % | DIASTOLIC BLOOD PRESSURE: 64 MMHG | BODY MASS INDEX: 56.25 KG/M2

## 2024-09-30 DIAGNOSIS — J01.40 ACUTE NON-RECURRENT PANSINUSITIS: Primary | ICD-10-CM

## 2024-09-30 DIAGNOSIS — R05.1 ACUTE COUGH: ICD-10-CM

## 2024-09-30 PROCEDURE — G8427 DOCREV CUR MEDS BY ELIG CLIN: HCPCS | Performed by: NURSE PRACTITIONER

## 2024-09-30 PROCEDURE — 3017F COLORECTAL CA SCREEN DOC REV: CPT | Performed by: NURSE PRACTITIONER

## 2024-09-30 PROCEDURE — 1036F TOBACCO NON-USER: CPT | Performed by: NURSE PRACTITIONER

## 2024-09-30 PROCEDURE — G8417 CALC BMI ABV UP PARAM F/U: HCPCS | Performed by: NURSE PRACTITIONER

## 2024-09-30 PROCEDURE — 99213 OFFICE O/P EST LOW 20 MIN: CPT | Performed by: NURSE PRACTITIONER

## 2024-09-30 RX ORDER — PREDNISONE 20 MG/1
20 TABLET ORAL 2 TIMES DAILY
Qty: 10 TABLET | Refills: 0 | Status: SHIPPED | OUTPATIENT
Start: 2024-09-30 | End: 2024-10-05

## 2024-09-30 ASSESSMENT — ENCOUNTER SYMPTOMS
CONSTIPATION: 0
CHEST TIGHTNESS: 1
EYE PAIN: 0
EYE REDNESS: 0
SHORTNESS OF BREATH: 1
TROUBLE SWALLOWING: 0
VOMITING: 0
SORE THROAT: 0
ABDOMINAL PAIN: 0
WHEEZING: 0
NAUSEA: 0
DIARRHEA: 0
COUGH: 1
SINUS PRESSURE: 1
RHINORRHEA: 1

## 2024-09-30 NOTE — PROGRESS NOTES
parent given educational materials - see patient instructions.  Discussed use, benefit, and side effects of prescribed medications.  All patient questions answered.  Patient and/or parent voiced understanding.      Electronically signed by ANA LAURA URIBE 9/30/2024 at 3:20 PM

## 2024-10-21 DIAGNOSIS — E78.2 MIXED HYPERLIPIDEMIA: ICD-10-CM

## 2024-10-21 NOTE — TELEPHONE ENCOUNTER
LOV 7/23/24  LRF 10/11/23  RTO My chart message sent      Health Maintenance   Topic Date Due    Hepatitis B vaccine (1 of 3 - 19+ 3-dose series) Never done    A1C test (Diabetic or Prediabetic)  10/19/2023    Lipids  10/19/2023    Breast cancer screen  06/22/2024    Flu vaccine (1) 08/01/2024    COVID-19 Vaccine (2 - 2023-24 season) 09/01/2024    Depression Monitoring  03/08/2025    DTaP/Tdap/Td vaccine (3 - Td or Tdap) 11/03/2027    Colorectal Cancer Screen  09/29/2030    Shingles vaccine  Completed    Hepatitis C screen  Completed    HIV screen  Completed    Hepatitis A vaccine  Aged Out    Hib vaccine  Aged Out    Polio vaccine  Aged Out    Meningococcal (ACWY) vaccine  Aged Out    Pneumococcal 0-64 years Vaccine  Aged Out             (applicable per patient's age: Cancer Screenings, Depression Screening, Fall Risk Screening, Immunizations)    Hemoglobin A1C (%)   Date Value   10/19/2022 5.7   12/14/2021 6.1 (H)   02/25/2020 6.1 (H)     AST (U/L)   Date Value   03/08/2024 29     ALT (U/L)   Date Value   03/08/2024 43     BUN (mg/dL)   Date Value   03/08/2024 15      (goal A1C is < 7)   (goal LDL is <100) need 30-50% reduction from baseline     BP Readings from Last 3 Encounters:   09/30/24 130/64   03/08/24 122/74   01/10/24 130/78    (goal /80)      All Future Testing planned in [facility]:  Lab Frequency Next Occurrence       Next Visit Date:  No future appointments.         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

## 2024-10-22 RX ORDER — ATORVASTATIN CALCIUM 20 MG/1
20 TABLET, FILM COATED ORAL DAILY
Qty: 30 TABLET | Refills: 0 | Status: SHIPPED | OUTPATIENT
Start: 2024-10-22

## 2024-11-03 DIAGNOSIS — F33.0 MILD EPISODE OF RECURRENT MAJOR DEPRESSIVE DISORDER (HCC): ICD-10-CM

## 2024-11-04 RX ORDER — CITALOPRAM HYDROBROMIDE 10 MG/1
10 TABLET ORAL DAILY
Qty: 90 TABLET | Refills: 0 | Status: SHIPPED | OUTPATIENT
Start: 2024-11-04

## 2024-11-24 DIAGNOSIS — E78.2 MIXED HYPERLIPIDEMIA: ICD-10-CM

## 2024-11-25 RX ORDER — ATORVASTATIN CALCIUM 20 MG/1
20 TABLET, FILM COATED ORAL DAILY
Qty: 30 TABLET | Refills: 0 | Status: SHIPPED | OUTPATIENT
Start: 2024-11-25

## 2024-11-25 NOTE — TELEPHONE ENCOUNTER
LOV 7/23/24   RTO ---  LRF 10/22/24          Controlled Substance Monitoring:    Acute and Chronic Pain Monitoring:   RX Monitoring Attestation Periodic Controlled Substance Monitoring   12/7/2017  10:27 AM The Prescription Monitoring Report for this patient was reviewed today. No signs of potential drug abuse or diversion identified.

## 2024-12-05 ENCOUNTER — OFFICE VISIT (OUTPATIENT)
Dept: FAMILY MEDICINE CLINIC | Age: 59
End: 2024-12-05
Payer: COMMERCIAL

## 2024-12-05 VITALS
HEIGHT: 60 IN | OXYGEN SATURATION: 94 % | TEMPERATURE: 99.1 F | SYSTOLIC BLOOD PRESSURE: 136 MMHG | BODY MASS INDEX: 57.52 KG/M2 | HEART RATE: 81 BPM | WEIGHT: 293 LBS | DIASTOLIC BLOOD PRESSURE: 78 MMHG

## 2024-12-05 DIAGNOSIS — J06.9 ACUTE URI: Primary | ICD-10-CM

## 2024-12-05 DIAGNOSIS — J01.40 ACUTE NON-RECURRENT PANSINUSITIS: ICD-10-CM

## 2024-12-05 PROCEDURE — 99214 OFFICE O/P EST MOD 30 MIN: CPT | Performed by: PEDIATRICS

## 2024-12-05 PROCEDURE — 1036F TOBACCO NON-USER: CPT | Performed by: PEDIATRICS

## 2024-12-05 PROCEDURE — G8417 CALC BMI ABV UP PARAM F/U: HCPCS | Performed by: PEDIATRICS

## 2024-12-05 PROCEDURE — G8484 FLU IMMUNIZE NO ADMIN: HCPCS | Performed by: PEDIATRICS

## 2024-12-05 PROCEDURE — 3017F COLORECTAL CA SCREEN DOC REV: CPT | Performed by: PEDIATRICS

## 2024-12-05 PROCEDURE — G8427 DOCREV CUR MEDS BY ELIG CLIN: HCPCS | Performed by: PEDIATRICS

## 2024-12-05 RX ORDER — MULTIVITAMIN WITH IRON
1 TABLET ORAL DAILY
COMMUNITY

## 2024-12-05 RX ORDER — AZITHROMYCIN 250 MG/1
TABLET, FILM COATED ORAL
Qty: 6 TABLET | Refills: 0 | Status: SHIPPED | OUTPATIENT
Start: 2024-12-05 | End: 2024-12-15

## 2024-12-05 RX ORDER — METHYLPREDNISOLONE 4 MG/1
TABLET ORAL
Qty: 1 KIT | Refills: 0 | Status: SHIPPED | OUTPATIENT
Start: 2024-12-05 | End: 2024-12-11

## 2024-12-05 ASSESSMENT — ENCOUNTER SYMPTOMS
STRIDOR: 0
ABDOMINAL PAIN: 0
SORE THROAT: 1
BLOOD IN STOOL: 0
VOMITING: 0
WHEEZING: 0
SHORTNESS OF BREATH: 0
PHOTOPHOBIA: 0
RHINORRHEA: 1
COUGH: 1
VOICE CHANGE: 0
EYE PAIN: 0
EYE REDNESS: 0
SINUS PAIN: 1
EYE DISCHARGE: 1
CONSTIPATION: 0
COLOR CHANGE: 0
SINUS PRESSURE: 1
DIARRHEA: 0
NAUSEA: 0

## 2024-12-05 NOTE — PROGRESS NOTES
Positive for discharge. Negative for photophobia, pain and redness.   Respiratory:  Positive for cough. Negative for shortness of breath, wheezing and stridor.    Cardiovascular:  Negative for chest pain, palpitations and leg swelling.   Gastrointestinal:  Negative for abdominal pain, blood in stool, constipation, diarrhea, nausea and vomiting.   Endocrine: Negative for polydipsia, polyphagia and polyuria.   Genitourinary:  Negative for decreased urine volume, dysuria, frequency and hematuria.   Skin:  Negative for color change and rash.   Allergic/Immunologic: Negative for immunocompromised state.   Neurological:  Negative for dizziness, light-headedness and headaches.   Hematological:  Positive for adenopathy. Does not bruise/bleed easily.          Objective     Physical Exam  Vitals and nursing note reviewed.   Constitutional:       Appearance: Normal appearance.   HENT:      Head: Normocephalic and atraumatic.      Right Ear: Ear canal and external ear normal. A middle ear effusion is present. There is no impacted cerumen.      Left Ear: Ear canal and external ear normal. A middle ear effusion is present. There is no impacted cerumen.      Nose: Mucosal edema, congestion and rhinorrhea present.      Right Sinus: Maxillary sinus tenderness and frontal sinus tenderness present.      Left Sinus: Maxillary sinus tenderness and frontal sinus tenderness present.      Mouth/Throat:      Mouth: Mucous membranes are moist.      Pharynx: Posterior oropharyngeal erythema present.      Tonsils: No tonsillar exudate or tonsillar abscesses. 2+ on the right. 2+ on the left.   Eyes:      General: No scleral icterus.        Right eye: Discharge present.         Left eye: Discharge present.     Extraocular Movements: Extraocular movements intact.      Conjunctiva/sclera: Conjunctivae normal.      Pupils: Pupils are equal, round, and reactive to light.   Cardiovascular:      Rate and Rhythm: Normal rate and regular rhythm.

## 2024-12-26 DIAGNOSIS — E78.2 MIXED HYPERLIPIDEMIA: ICD-10-CM

## 2024-12-26 NOTE — TELEPHONE ENCOUNTER
LOV 12/5/24   RTO ---  LRF 11/25/24          Controlled Substance Monitoring:    Acute and Chronic Pain Monitoring:   RX Monitoring Attestation Periodic Controlled Substance Monitoring   12/7/2017  10:27 AM The Prescription Monitoring Report for this patient was reviewed today. No signs of potential drug abuse or diversion identified.

## 2024-12-30 RX ORDER — ATORVASTATIN CALCIUM 20 MG/1
20 TABLET, FILM COATED ORAL DAILY
Qty: 7 TABLET | Refills: 0 | Status: SHIPPED | OUTPATIENT
Start: 2024-12-30 | End: 2024-12-31 | Stop reason: SDUPTHER

## 2024-12-30 NOTE — TELEPHONE ENCOUNTER
Patient needs called- 7 day supply sent.  She needs to have appt to have routine labs completed and est with new pcp for chronic conditions.

## 2024-12-31 RX ORDER — ATORVASTATIN CALCIUM 20 MG/1
20 TABLET, FILM COATED ORAL DAILY
Qty: 90 TABLET | Refills: 0 | Status: SHIPPED | OUTPATIENT
Start: 2024-12-31 | End: 2025-12-31

## 2024-12-31 NOTE — TELEPHONE ENCOUNTER
Appt made with pt due to pt's schedule appt was made for 2/5/24. Pt is asking for medication to be refilled till appt. Script pended.

## 2025-01-03 DIAGNOSIS — E78.2 MIXED HYPERLIPIDEMIA: ICD-10-CM

## 2025-01-03 RX ORDER — ATORVASTATIN CALCIUM 20 MG/1
20 TABLET, FILM COATED ORAL DAILY
Qty: 7 TABLET | OUTPATIENT
Start: 2025-01-03 | End: 2026-01-03

## 2025-01-24 NOTE — PROGRESS NOTES
Luz Dhaliwal is a 59 y.o. female who presents in office today with Self establish new care with office. Previous PCP was Helga Delarosa.     Chief Complaint   Patient presents with    Established New Doctor     Previous PCP Helga Delarosa, ANA LAURA Freeman     RT armpit. Onset 1-2 weeks ago. Tenderness and denies any redness.    Weight Management     Semaglutide        History of Present Illness:     HPI    Patient here to establish care, previous PCP was Helga Delarosa.  Last office visit was a video visit in 2024.    History of GERD, stable on Protonix.    History of prediabetes, last A1c in 2022 was 5.7%, is not currently taking any medications, but was previously on semaglutide injections through the compound pharmacy.  She stopped this because it was difficult for her to drive to pick it up.  She would rather do tablets, but reports she is on Adipex prior, and had issues sleeping.  She is unsure if her insurance covers weight loss medications, but tells me that her weight has been much worse since working at home.  She started working at home the last 2 years, and tells me she does not snack while she is working, but is not active either.  Tells me that her activity is better in the summer, and that she did try a standing desk once this did not work well for her.  She is not currently part of the gym, but told me even if she was she probably would not go.    She has had a lump in her right axillary area for about the last 2 weeks, she denies being sick recently.  She tells me that his tender to the touch, but is not having any drainage, area is not reddened.    Hyperlipidemia, currently being treated with atorvastatin.    Depression on Celexa, stable.    Care gaps:   Colon CA screening: Colonoscopy   Vaccines:   Hepatitis C/HIV screens: non-reactive   Breast CA screening: due   OB/GYN, cervical CA screening: Status post hysterectomy  Depression Screenin    Health

## 2025-02-02 SDOH — HEALTH STABILITY: PHYSICAL HEALTH: ON AVERAGE, HOW MANY DAYS PER WEEK DO YOU ENGAGE IN MODERATE TO STRENUOUS EXERCISE (LIKE A BRISK WALK)?: 2 DAYS

## 2025-02-02 SDOH — HEALTH STABILITY: PHYSICAL HEALTH: ON AVERAGE, HOW MANY MINUTES DO YOU ENGAGE IN EXERCISE AT THIS LEVEL?: 20 MIN

## 2025-02-03 DIAGNOSIS — F33.0 MILD EPISODE OF RECURRENT MAJOR DEPRESSIVE DISORDER (HCC): ICD-10-CM

## 2025-02-03 RX ORDER — CITALOPRAM HYDROBROMIDE 10 MG/1
10 TABLET ORAL DAILY
Qty: 90 TABLET | Refills: 0 | Status: SHIPPED | OUTPATIENT
Start: 2025-02-03

## 2025-02-03 NOTE — TELEPHONE ENCOUNTER
LOV 12/5/24   RTO 2/5/25  LRF 11/4/24          Controlled Substance Monitoring:    Acute and Chronic Pain Monitoring:   RX Monitoring Attestation Periodic Controlled Substance Monitoring   12/7/2017  10:27 AM The Prescription Monitoring Report for this patient was reviewed today. No signs of potential drug abuse or diversion identified.

## 2025-02-05 ENCOUNTER — HOSPITAL ENCOUNTER (OUTPATIENT)
Age: 60
Setting detail: SPECIMEN
Discharge: HOME | End: 2025-02-05

## 2025-02-05 ENCOUNTER — OFFICE VISIT (OUTPATIENT)
Dept: FAMILY MEDICINE CLINIC | Age: 60
End: 2025-02-05
Payer: COMMERCIAL

## 2025-02-05 VITALS
DIASTOLIC BLOOD PRESSURE: 88 MMHG | HEART RATE: 70 BPM | BODY MASS INDEX: 59.76 KG/M2 | SYSTOLIC BLOOD PRESSURE: 130 MMHG | TEMPERATURE: 97.9 F | OXYGEN SATURATION: 94 % | RESPIRATION RATE: 20 BRPM | WEIGHT: 293 LBS

## 2025-02-05 DIAGNOSIS — R73.03 PRE-DIABETES: ICD-10-CM

## 2025-02-05 DIAGNOSIS — Z76.89 ENCOUNTER TO ESTABLISH CARE: ICD-10-CM

## 2025-02-05 DIAGNOSIS — Z13.6 SCREENING FOR CARDIOVASCULAR CONDITION: ICD-10-CM

## 2025-02-05 DIAGNOSIS — E66.01 MORBID OBESITY: ICD-10-CM

## 2025-02-05 DIAGNOSIS — E78.00 PURE HYPERCHOLESTEROLEMIA: ICD-10-CM

## 2025-02-05 DIAGNOSIS — K21.9 GASTROESOPHAGEAL REFLUX DISEASE WITHOUT ESOPHAGITIS: ICD-10-CM

## 2025-02-05 DIAGNOSIS — Z76.89 ENCOUNTER TO ESTABLISH CARE: Primary | ICD-10-CM

## 2025-02-05 DIAGNOSIS — Z12.31 SCREENING MAMMOGRAM FOR BREAST CANCER: ICD-10-CM

## 2025-02-05 DIAGNOSIS — R59.0 LYMPHADENOPATHY, AXILLARY: ICD-10-CM

## 2025-02-05 DIAGNOSIS — F33.0 MILD EPISODE OF RECURRENT MAJOR DEPRESSIVE DISORDER (HCC): ICD-10-CM

## 2025-02-05 LAB
ALBUMIN SERPL-MCNC: 4.2 G/DL (ref 3.5–5.2)
ALBUMIN/GLOB SERPL: 1.4 {RATIO} (ref 1–2.5)
ALP SERPL-CCNC: 81 U/L (ref 35–104)
ALT SERPL-CCNC: 55 U/L (ref 10–35)
ANION GAP SERPL CALCULATED.3IONS-SCNC: 15 MMOL/L (ref 9–16)
AST SERPL-CCNC: 37 U/L (ref 10–35)
BILIRUB SERPL-MCNC: 0.5 MG/DL (ref 0–1.2)
BUN SERPL-MCNC: 13 MG/DL (ref 6–20)
CALCIUM SERPL-MCNC: 9.5 MG/DL (ref 8.6–10.4)
CHLORIDE SERPL-SCNC: 105 MMOL/L (ref 98–107)
CHOLEST SERPL-MCNC: 175 MG/DL (ref 0–199)
CHOLESTEROL/HDL RATIO: 2.9
CO2 SERPL-SCNC: 25 MMOL/L (ref 20–31)
CREAT SERPL-MCNC: 0.6 MG/DL (ref 0.6–0.9)
ERYTHROCYTE [DISTWIDTH] IN BLOOD BY AUTOMATED COUNT: 17.8 % (ref 11.8–14.4)
EST. AVERAGE GLUCOSE BLD GHB EST-MCNC: 114 MG/DL
GFR, ESTIMATED: >90 ML/MIN/1.73M2
GLUCOSE P FAST SERPL-MCNC: 100 MG/DL (ref 74–99)
HBA1C MFR BLD: 5.6 % (ref 4–6)
HCT VFR BLD AUTO: 39.2 % (ref 36.3–47.1)
HDLC SERPL-MCNC: 60 MG/DL
HGB BLD-MCNC: 11.7 G/DL (ref 11.9–15.1)
LDLC SERPL CALC-MCNC: 86 MG/DL (ref 0–100)
MCH RBC QN AUTO: 20.3 PG (ref 25.2–33.5)
MCHC RBC AUTO-ENTMCNC: 29.8 G/DL (ref 28.4–34.8)
MCV RBC AUTO: 67.9 FL (ref 82.6–102.9)
NRBC BLD-RTO: 0 PER 100 WBC
PLATELET # BLD AUTO: 281 K/UL (ref 138–453)
PMV BLD AUTO: 11.1 FL (ref 8.1–13.5)
POTASSIUM SERPL-SCNC: 4.3 MMOL/L (ref 3.7–5.3)
PROT SERPL-MCNC: 7.2 G/DL (ref 6.6–8.7)
RBC # BLD AUTO: 5.77 M/UL (ref 3.95–5.11)
SODIUM SERPL-SCNC: 145 MMOL/L (ref 136–145)
TRIGL SERPL-MCNC: 146 MG/DL
TSH SERPL DL<=0.05 MIU/L-ACNC: 2.65 UIU/ML (ref 0.27–4.2)
VLDLC SERPL CALC-MCNC: 29 MG/DL (ref 1–30)
WBC OTHER # BLD: 5.4 K/UL (ref 3.5–11.3)

## 2025-02-05 PROCEDURE — 99214 OFFICE O/P EST MOD 30 MIN: CPT

## 2025-02-05 ASSESSMENT — PATIENT HEALTH QUESTIONNAIRE - PHQ9
3. TROUBLE FALLING OR STAYING ASLEEP: SEVERAL DAYS
7. TROUBLE CONCENTRATING ON THINGS, SUCH AS READING THE NEWSPAPER OR WATCHING TELEVISION: NOT AT ALL
SUM OF ALL RESPONSES TO PHQ QUESTIONS 1-9: 7
5. POOR APPETITE OR OVEREATING: NEARLY EVERY DAY
6. FEELING BAD ABOUT YOURSELF - OR THAT YOU ARE A FAILURE OR HAVE LET YOURSELF OR YOUR FAMILY DOWN: NOT AT ALL
2. FEELING DOWN, DEPRESSED OR HOPELESS: NOT AT ALL
8. MOVING OR SPEAKING SO SLOWLY THAT OTHER PEOPLE COULD HAVE NOTICED. OR THE OPPOSITE, BEING SO FIGETY OR RESTLESS THAT YOU HAVE BEEN MOVING AROUND A LOT MORE THAN USUAL: NOT AT ALL
10. IF YOU CHECKED OFF ANY PROBLEMS, HOW DIFFICULT HAVE THESE PROBLEMS MADE IT FOR YOU TO DO YOUR WORK, TAKE CARE OF THINGS AT HOME, OR GET ALONG WITH OTHER PEOPLE: SOMEWHAT DIFFICULT
SUM OF ALL RESPONSES TO PHQ QUESTIONS 1-9: 7
4. FEELING TIRED OR HAVING LITTLE ENERGY: NEARLY EVERY DAY
9. THOUGHTS THAT YOU WOULD BE BETTER OFF DEAD, OR OF HURTING YOURSELF: NOT AT ALL
1. LITTLE INTEREST OR PLEASURE IN DOING THINGS: NOT AT ALL
SUM OF ALL RESPONSES TO PHQ9 QUESTIONS 1 & 2: 0
SUM OF ALL RESPONSES TO PHQ QUESTIONS 1-9: 7
SUM OF ALL RESPONSES TO PHQ QUESTIONS 1-9: 7

## 2025-02-06 ENCOUNTER — HOSPITAL ENCOUNTER (OUTPATIENT)
Dept: ULTRASOUND IMAGING | Age: 60
Discharge: HOME | End: 2025-02-08
Payer: COMMERCIAL

## 2025-02-06 ENCOUNTER — HOSPITAL ENCOUNTER (OUTPATIENT)
Age: 60
Discharge: HOME | End: 2025-02-08
Payer: COMMERCIAL

## 2025-02-06 DIAGNOSIS — R79.89 ABNORMAL LFTS (LIVER FUNCTION TESTS): Primary | ICD-10-CM

## 2025-02-06 DIAGNOSIS — Z12.31 SCREENING MAMMOGRAM FOR BREAST CANCER: ICD-10-CM

## 2025-02-06 DIAGNOSIS — R59.0 LYMPHADENOPATHY, AXILLARY: ICD-10-CM

## 2025-02-06 PROCEDURE — 76882 US LMTD JT/FCL EVL NVASC XTR: CPT

## 2025-02-06 PROCEDURE — 77067 SCR MAMMO BI INCL CAD: CPT

## 2025-02-11 ENCOUNTER — TELEPHONE (OUTPATIENT)
Dept: FAMILY MEDICINE CLINIC | Age: 60
End: 2025-02-11

## 2025-02-11 NOTE — TELEPHONE ENCOUNTER
Ruth in [place] advised patient to call and give us an fyi needing PA for her:  Tirzepatide-Weight Management 2.5 mg     Please advise and route clinical staff.

## 2025-02-25 ENCOUNTER — OFFICE VISIT (OUTPATIENT)
Dept: PRIMARY CARE CLINIC | Age: 60
End: 2025-02-25
Payer: COMMERCIAL

## 2025-02-25 VITALS
DIASTOLIC BLOOD PRESSURE: 82 MMHG | TEMPERATURE: 97.8 F | SYSTOLIC BLOOD PRESSURE: 134 MMHG | OXYGEN SATURATION: 97 % | HEART RATE: 66 BPM

## 2025-02-25 DIAGNOSIS — R52 BODY ACHES: Primary | ICD-10-CM

## 2025-02-25 DIAGNOSIS — J02.9 SORE THROAT: ICD-10-CM

## 2025-02-25 LAB
EXP DATE SOLUTION: NORMAL
EXP DATE SWAB: NORMAL
EXPIRATION DATE: NORMAL
INFLUENZA A RNA, POC: NORMAL
INFLUENZA B RNA, POC: NORMAL
LOT NUMBER POC: NORMAL
LOT NUMBER SOLUTION: NORMAL
LOT NUMBER SWAB: NORMAL
S PYO AG THROAT QL: NORMAL
SARS-COV-2 RNA, POC: NEGATIVE
VALID INTERNAL CONTROL: NORMAL

## 2025-02-25 PROCEDURE — 87636 SARSCOV2 & INF A&B AMP PRB: CPT

## 2025-02-25 PROCEDURE — 87880 STREP A ASSAY W/OPTIC: CPT

## 2025-02-25 PROCEDURE — 99213 OFFICE O/P EST LOW 20 MIN: CPT

## 2025-02-25 ASSESSMENT — ENCOUNTER SYMPTOMS
SINUS PAIN: 0
SORE THROAT: 1
COUGH: 1

## 2025-02-25 NOTE — PROGRESS NOTES
[facility]  [facility]  [address] [place] [address]    [facility] WALK IN  [address] [place] [address]  Dept: [phone]    Luz Dhaliwal is a 59 y.o. female Established patient, who presents to the walk-in clinic today with conditions/complaints as noted below:    Chief Complaint   Patient presents with    Sore Throat     Fever Sunday night woke up Monday with a sore throat, body aches, ear pain, and headaches.         HPI:     Fever   This is a new problem. Episode onset: Jairon night. The problem has been waxing and waning. The maximum temperature noted was 99 to 99.9 F. Associated symptoms include coughing, ear pain, headaches, muscle aches and a sore throat. She has tried acetaminophen for the symptoms.   Risk factors: no sick contacts    Cold Symptoms   This is a new problem. The current episode started in the past 7 days. Maximum temperature: 99.2 highest fever. Associated symptoms include coughing, ear pain, headaches and a sore throat. Pertinent negatives include no sinus pain. She has tried acetaminophen for the symptoms.       Past Medical History:   Diagnosis Date    Back pain     RATE 10    Beta thalassemia minor     Depression     MILD    Diverticulitis     past    E-coli UTI 2010    Fall     AT HOME 3 WEEKS AGO    Gastric ulcer     resolved    GERD (gastroesophageal reflux disease)     resolved    Hyperlipidemia     Obesity     PONV (postoperative nausea and vomiting)     REQUEST PREOP MEDS FOR N/V    Snores     Wears glasses        Current Outpatient Medications   Medication Sig Dispense Refill    tirzepatide-weight management (ZEPBOUND) 2.5 MG/0.5ML SOAJ subCUTAneous auto-injector pen Inject 2.5 mg into the skin every 7 days 2 mL 0    citalopram (CELEXA) 10 MG tablet TAKE 1 TABLET BY MOUTH DAILY 90 tablet 0    atorvastatin (LIPITOR) 20 MG tablet Take 1 tablet by mouth daily 90 tablet

## 2025-02-28 ENCOUNTER — OFFICE VISIT (OUTPATIENT)
Dept: PRIMARY CARE CLINIC | Age: 60
End: 2025-02-28
Payer: COMMERCIAL

## 2025-02-28 VITALS
HEART RATE: 86 BPM | OXYGEN SATURATION: 95 % | DIASTOLIC BLOOD PRESSURE: 86 MMHG | SYSTOLIC BLOOD PRESSURE: 148 MMHG | TEMPERATURE: 98.1 F

## 2025-02-28 DIAGNOSIS — H65.91 RIGHT OTITIS MEDIA WITH EFFUSION: Primary | ICD-10-CM

## 2025-02-28 PROCEDURE — 99213 OFFICE O/P EST LOW 20 MIN: CPT | Performed by: PHYSICIAN ASSISTANT

## 2025-02-28 RX ORDER — PREDNISONE 20 MG/1
20 TABLET ORAL 2 TIMES DAILY
Qty: 10 TABLET | Refills: 0 | Status: SHIPPED | OUTPATIENT
Start: 2025-02-28 | End: 2025-03-05

## 2025-02-28 RX ORDER — AMOXICILLIN 500 MG/1
500 CAPSULE ORAL 2 TIMES DAILY
Qty: 20 CAPSULE | Refills: 0 | Status: SHIPPED | OUTPATIENT
Start: 2025-02-28 | End: 2025-03-10

## 2025-02-28 RX ORDER — FLUCONAZOLE 150 MG/1
150 TABLET ORAL
Qty: 2 TABLET | Refills: 0 | Status: SHIPPED | OUTPATIENT
Start: 2025-02-28 | End: 2025-03-06

## 2025-02-28 RX ORDER — BROMPHENIRAMINE MALEATE, PSEUDOEPHEDRINE HYDROCHLORIDE, AND DEXTROMETHORPHAN HYDROBROMIDE 2; 30; 10 MG/5ML; MG/5ML; MG/5ML
SYRUP ORAL
Qty: 180 ML | Refills: 0 | Status: SHIPPED | OUTPATIENT
Start: 2025-02-28

## 2025-03-05 ASSESSMENT — ENCOUNTER SYMPTOMS
GASTROINTESTINAL NEGATIVE: 1
COUGH: 1

## 2025-03-05 NOTE — PROGRESS NOTES
[facility]  [address] [place] [address]  Phone: [phone]  Fax: [phone]       [facility] WALK - IN    Pt Name: Luz Dhaliwal  MRN: [id number]  Birthdate 1965  Date of evaluation: 2/28/2025  Provider: Nella Andrea PA-C     CHIEF COMPLAINT       Chief Complaint   Patient presents with    Ear Pain     Bilateral ear pain & fullness           HISTORY OF PRESENT ILLNESS  (Location/Symptom, Timing/Onset, Context/Setting, Quality, Duration, Modifying Factors, Severity.)   Luz Dhaliwal is a 59 y.o. White (non-) [1] female who presents to the office for evaluation of      Ear Pain   There is pain in both (More so on the right) ears. The problem has been waxing and waning. There has been no fever. The pain is moderate. Associated symptoms include coughing.       Nursing Notes were reviewed.    REVIEW OF SYSTEMS    (2-9 systems for level 4, 10 or more for level 5)     Review of Systems   Constitutional:  Negative for chills, diaphoresis and fatigue.   HENT:  Positive for congestion, ear pain and postnasal drip.    Respiratory:  Positive for cough.    Cardiovascular: Negative.    Gastrointestinal: Negative.    Genitourinary: Negative.    Musculoskeletal: Negative.          Except as noted above the remainder of the review of systems was reviewed andnegative.       PAST MEDICAL HISTORY   History reviewed.    Past Medical History:   Diagnosis Date    Back pain     RATE 10    Beta thalassemia minor     Depression     MILD    Diverticulitis     past    E-coli UTI 2010    Fall     AT HOME 3 WEEKS AGO    Gastric ulcer     resolved    GERD (gastroesophageal reflux disease)     resolved    Hyperlipidemia     Obesity     PONV (postoperative nausea and vomiting)     REQUEST PREOP MEDS FOR N/V    Snores     Wears glasses          SURGICAL HISTORY     History reviewed.    Past Surgical History:   Procedure Laterality Date    ANKLE SURGERY Right 10/2018    ANKLE SURGERY Right

## 2025-03-20 ENCOUNTER — PATIENT MESSAGE (OUTPATIENT)
Dept: FAMILY MEDICINE CLINIC | Age: 60
End: 2025-03-20

## 2025-03-20 DIAGNOSIS — R59.0 LYMPHADENOPATHY, AXILLARY: Primary | ICD-10-CM

## 2025-04-09 DIAGNOSIS — E78.2 MIXED HYPERLIPIDEMIA: ICD-10-CM

## 2025-04-09 RX ORDER — ATORVASTATIN CALCIUM 20 MG/1
20 TABLET, FILM COATED ORAL DAILY
Qty: 90 TABLET | Refills: 0 | Status: SHIPPED | OUTPATIENT
Start: 2025-04-09 | End: 2026-04-09

## 2025-04-09 NOTE — TELEPHONE ENCOUNTER
LOV 2/5/25   RTO na  LRF 12/31/24          Controlled Substance Monitoring:    Acute and Chronic Pain Monitoring:   RX Monitoring Attestation Periodic Controlled Substance Monitoring   12/7/2017  10:27 AM The Prescription Monitoring Report for this patient was reviewed today. No signs of potential drug abuse or diversion identified.

## 2025-05-01 SDOH — ECONOMIC STABILITY: FOOD INSECURITY: WITHIN THE PAST 12 MONTHS, THE FOOD YOU BOUGHT JUST DIDN'T LAST AND YOU DIDN'T HAVE MONEY TO GET MORE.: NEVER TRUE

## 2025-05-01 SDOH — ECONOMIC STABILITY: INCOME INSECURITY: IN THE LAST 12 MONTHS, WAS THERE A TIME WHEN YOU WERE NOT ABLE TO PAY THE MORTGAGE OR RENT ON TIME?: NO

## 2025-05-01 SDOH — ECONOMIC STABILITY: FOOD INSECURITY: WITHIN THE PAST 12 MONTHS, YOU WORRIED THAT YOUR FOOD WOULD RUN OUT BEFORE YOU GOT MONEY TO BUY MORE.: NEVER TRUE

## 2025-05-01 ASSESSMENT — ENCOUNTER SYMPTOMS
ABDOMINAL DISTENTION: 0
TROUBLE SWALLOWING: 0
DIARRHEA: 0
NAUSEA: 0
VOMITING: 0
COLOR CHANGE: 0
SORE THROAT: 0
BACK PAIN: 0
RHINORRHEA: 0
COUGH: 0
EYE DISCHARGE: 0
EYE PAIN: 0
ABDOMINAL PAIN: 0
WHEEZING: 0
EYE REDNESS: 0
CHEST TIGHTNESS: 0
SHORTNESS OF BREATH: 0

## 2025-05-02 ENCOUNTER — RESULTS FOLLOW-UP (OUTPATIENT)
Dept: FAMILY MEDICINE CLINIC | Age: 60
End: 2025-05-02

## 2025-05-02 ENCOUNTER — LAB (OUTPATIENT)
Dept: FAMILY MEDICINE CLINIC | Age: 60
End: 2025-05-02
Payer: COMMERCIAL

## 2025-05-02 ENCOUNTER — HOSPITAL ENCOUNTER (OUTPATIENT)
Age: 60
Setting detail: SPECIMEN
Discharge: HOME | End: 2025-05-02

## 2025-05-02 VITALS — BODY MASS INDEX: 57.96 KG/M2 | WEIGHT: 293 LBS

## 2025-05-02 DIAGNOSIS — R79.89 ABNORMAL LFTS (LIVER FUNCTION TESTS): ICD-10-CM

## 2025-05-02 DIAGNOSIS — Z51.81 ENCOUNTER FOR THERAPEUTIC DRUG LEVEL MONITORING: Primary | ICD-10-CM

## 2025-05-02 LAB
ALBUMIN SERPL-MCNC: 4.3 G/DL (ref 3.5–5.2)
ALBUMIN/GLOB SERPL: 1.3 {RATIO} (ref 1–2.5)
ALCOHOL URINE: NORMAL
ALP SERPL-CCNC: 95 U/L (ref 35–104)
ALT SERPL-CCNC: 63 U/L (ref 10–35)
AMPHETAMINE SCREEN URINE: NORMAL
AST SERPL-CCNC: 45 U/L (ref 10–35)
BARBITURATE SCREEN URINE: NEGATIVE
BENZODIAZEPINE SCREEN, URINE: NEGATIVE
BILIRUB DIRECT SERPL-MCNC: 0.2 MG/DL (ref 0–0.2)
BILIRUB INDIRECT SERPL-MCNC: 0.3 MG/DL (ref 0–1)
BILIRUB SERPL-MCNC: 0.5 MG/DL (ref 0–1.2)
BUPRENORPHINE URINE: NORMAL
COCAINE METABOLITE SCREEN URINE: NEGATIVE
FENTANYL SCREEN, URINE: NORMAL
GABAPENTIN SCREEN, URINE: NORMAL
GLOBULIN SER CALC-MCNC: 3.4 G/DL
MDMA, URINE: NEGATIVE
METHADONE SCREEN, URINE: NEGATIVE
METHAMPHETAMINE, URINE: NEGATIVE
OPIATE SCREEN URINE: NEGATIVE
OXYCODONE SCREEN URINE: NEGATIVE
PHENCYCLIDINE SCREEN URINE: NEGATIVE
PROPOXYPHENE SCREEN, URINE: NORMAL
PROT SERPL-MCNC: 7.7 G/DL (ref 6.6–8.7)
SYNTHETIC CANNABINOIDS(K2) SCREEN, URINE: NORMAL
THC SCREEN, URINE: NEGATIVE
TRAMADOL SCREEN URINE: NORMAL
TRICYCLIC ANTIDEPRESSANTS, UR: NEGATIVE

## 2025-05-02 PROCEDURE — 80305 DRUG TEST PRSMV DIR OPT OBS: CPT

## 2025-05-02 RX ORDER — PHENTERMINE HYDROCHLORIDE 37.5 MG/1
37.5 TABLET ORAL
Qty: 30 TABLET | Refills: 0 | Status: SHIPPED | OUTPATIENT
Start: 2025-05-02 | End: 2025-06-01

## 2025-05-02 NOTE — PROGRESS NOTES
Patient presents in the office today with self for initiation of adipex. She is alert, oriented and dressed appropriate.     Starting weight: 296.8 lb    UDS results available. Contract signed. Advised to 4 week F/U appt.       OARRS reviewed prior to refilling medications.   Medications on report are consistent with treatment plan.    UDS and med contract completed today  4-week follow-up     ANA LAURA Jackson-CNP  
15-19 = Moderately severe depression, 20-27 = Severe depression     Review of Systems (Subjective)     Review of Systems   Constitutional:  Negative for activity change, chills, fatigue and fever.   HENT:  Negative for congestion, nosebleeds, rhinorrhea, sore throat and trouble swallowing.    Eyes:  Negative for pain, discharge and redness.   Respiratory:  Negative for cough, chest tightness, shortness of breath and wheezing.    Cardiovascular:  Negative for chest pain.   Gastrointestinal:  Negative for abdominal distention, abdominal pain, diarrhea, nausea and vomiting.   Genitourinary:  Negative for difficulty urinating, flank pain, frequency and urgency.   Musculoskeletal:  Negative for arthralgias and back pain.   Skin:  Negative for color change.   Allergic/Immunologic: Negative for environmental allergies and food allergies.   Neurological:  Negative for dizziness, weakness, light-headedness and headaches.   Psychiatric/Behavioral:  Negative for agitation, behavioral problems, dysphoric mood and self-injury. The patient is not nervous/anxious and is not hyperactive.        See HPI     Physical Assessment (Objective)     LMP 09/11/2002      Physical Exam  Vitals reviewed.   Constitutional:       General: She is not in acute distress.     Appearance: Normal appearance. She is not ill-appearing.   HENT:      Head: Normocephalic.   Cardiovascular:      Rate and Rhythm: Normal rate and regular rhythm.      Heart sounds: Normal heart sounds. No murmur heard.  Pulmonary:      Effort: Pulmonary effort is normal. No respiratory distress.      Breath sounds: Normal breath sounds. No wheezing.   Musculoskeletal:         General: Normal range of motion.      Cervical back: Normal range of motion.   Skin:     General: Skin is warm.   Neurological:      General: No focal deficit present.      Mental Status: She is alert. Mental status is at baseline.   Psychiatric:         Mood and Affect: Mood normal.         Behavior:

## 2025-05-05 ENCOUNTER — RESULTS FOLLOW-UP (OUTPATIENT)
Dept: FAMILY MEDICINE CLINIC | Age: 60
End: 2025-05-05

## 2025-05-05 NOTE — PROGRESS NOTES
Luz Dhaliwal is a 59 y.o. female who presents in office today with Self medication specific follow up    Chief Complaint   Patient presents with    Weight Management     Adipex x1 month. Dry mouth and headaches noted. Watching what she is eating.    Results        History of Present Illness:     HPI    Here for 1 month adipex follow up.  She has lost 11 lbs. From initial weight of 296 lbs, 5% weight loss goal is 13.45 lbs.   Minimal medication side effects. Intermittent headaches, dry mouth at times.   She is working on healthy eating.    PDMP reviewed.     Care gaps:   Colon CA screening: Colonoscopy   Vaccines:   Hepatitis C/HIV screens: non-reactive   Breast CA screening: due   OB/GYN, cervical CA screening: Status post hysterectomy  Depression Screenin    Health Maintenance Due   Topic Date Due    Hepatitis B vaccine (1 of 3 - 19+ 3-dose series) Never done    Pneumococcal 50+ years Vaccine (1 of 1 - PCV) Never done        Patient Care Team:  Charlene Roman APRN - CNP as PCP - General (Nurse Practitioner)  Charlene Roman APRN - CNP as PCP - Empaneled Provider  Makruski, Laisha, RN as Care Transitions Nurse    Reviewed     [x] Past Medical, Family, and Social History was reviewed per writer and does contribute to the patient presenting condition.    [x] Laboratory Results, Vital signs, Imaging, Active Problems, Immunizations, Current/Recently Discontinued Medications, Health Maintenance Activities Due, Referral Notes (if available) were reviewed per writer     [x] Reviewed Depression screening if taken or valid today or any other valid screening tool (others seen below) Interpretation of Total Score DepressionSeverity: 1-4 = Minimal depression, 5-9 = Mild depression, 10-14 = Moderate depression, 15-19 = Moderately severe depression, 20-27 =Severe depression        2025     8:51 AM 3/8/2024     3:41 PM 5/3/2023     8:04 AM 2022    10:59 AM 3/22/2022    10:52 AM 2020

## 2025-05-12 ENCOUNTER — HOSPITAL ENCOUNTER (OUTPATIENT)
Facility: CLINIC | Age: 60
Discharge: HOME | End: 2025-05-12
Payer: COMMERCIAL

## 2025-05-12 PROCEDURE — 36415 COLL VENOUS BLD VENIPUNCTURE: CPT

## 2025-05-12 PROCEDURE — 84520 ASSAY OF UREA NITROGEN: CPT

## 2025-05-12 PROCEDURE — 84460 ALANINE AMINO (ALT) (SGPT): CPT

## 2025-05-12 PROCEDURE — 82977 ASSAY OF GGT: CPT

## 2025-05-12 PROCEDURE — 83883 ASSAY NEPHELOMETRY NOT SPEC: CPT

## 2025-05-12 PROCEDURE — 84450 TRANSFERASE (AST) (SGOT): CPT

## 2025-05-16 LAB
ALANINE AMINOTRANSFERASE, FIBROMETER: 60 U/L (ref 5–40)
ALPHA-2-MACROGLOBULIN, FIBROMETER: 184 MG/DL (ref 131–293)
ASPARTATE AMINOTRANSFERASE, FIBROMETER: 43 U/L (ref 9–40)
CIRRHOMETER PATIENT SCORE: 0.01
EER FIBROMETER REPORT: ABNORMAL
FIBROMETER INTERPRETATION: ABNORMAL
FIBROMETER PATIENT SCORE: 0.29
FIBROMETER PLATELET COUNT: 312
FIBROMETER PROTHROMBIN INDEX: 90 % (ref 90–120)
FIBROSIS METAVIR CLASSIFICATION: ABNORMAL
GAMMA GLUTAMYL TRANSFERASE, FIBROMETER: 22 U/L (ref 7–33)
INFLAMETER METAVIR CLASSIFICATION: ABNORMAL
INFLAMETER PATIENT SCORE: 0.28
UREA NITROGEN, FIBROMETER: 14 MG/DL (ref 7–20)

## 2025-05-31 DIAGNOSIS — F33.0 MILD EPISODE OF RECURRENT MAJOR DEPRESSIVE DISORDER: ICD-10-CM

## 2025-06-02 ENCOUNTER — OFFICE VISIT (OUTPATIENT)
Dept: FAMILY MEDICINE CLINIC | Age: 60
End: 2025-06-02
Payer: COMMERCIAL

## 2025-06-02 VITALS
SYSTOLIC BLOOD PRESSURE: 124 MMHG | OXYGEN SATURATION: 97 % | WEIGHT: 285.4 LBS | DIASTOLIC BLOOD PRESSURE: 86 MMHG | BODY MASS INDEX: 55.74 KG/M2 | HEART RATE: 69 BPM | TEMPERATURE: 98 F

## 2025-06-02 DIAGNOSIS — E66.813 CLASS 3 SEVERE OBESITY DUE TO EXCESS CALORIES WITHOUT SERIOUS COMORBIDITY WITH BODY MASS INDEX (BMI) OF 50.0 TO 59.9 IN ADULT (HCC): Primary | ICD-10-CM

## 2025-06-02 DIAGNOSIS — F33.0 MILD EPISODE OF RECURRENT MAJOR DEPRESSIVE DISORDER: ICD-10-CM

## 2025-06-02 PROCEDURE — 99213 OFFICE O/P EST LOW 20 MIN: CPT

## 2025-06-02 PROCEDURE — G2211 COMPLEX E/M VISIT ADD ON: HCPCS

## 2025-06-02 RX ORDER — PHENTERMINE HYDROCHLORIDE 37.5 MG/1
37.5 TABLET ORAL
Qty: 30 TABLET | Refills: 0 | Status: SHIPPED | OUTPATIENT
Start: 2025-06-02 | End: 2025-07-02

## 2025-06-02 RX ORDER — CITALOPRAM HYDROBROMIDE 10 MG/1
10 TABLET ORAL DAILY
Qty: 90 TABLET | Refills: 1 | Status: SHIPPED | OUTPATIENT
Start: 2025-06-02 | End: 2025-11-29

## 2025-06-03 RX ORDER — CITALOPRAM HYDROBROMIDE 10 MG/1
10 TABLET ORAL DAILY
Qty: 90 TABLET | Refills: 0 | OUTPATIENT
Start: 2025-06-03

## 2025-07-01 DIAGNOSIS — E66.813 CLASS 3 SEVERE OBESITY DUE TO EXCESS CALORIES WITHOUT SERIOUS COMORBIDITY WITH BODY MASS INDEX (BMI) OF 50.0 TO 59.9 IN ADULT (HCC): ICD-10-CM

## 2025-07-01 RX ORDER — PHENTERMINE HYDROCHLORIDE 37.5 MG/1
37.5 TABLET ORAL
Qty: 30 TABLET | Refills: 0 | Status: SHIPPED | OUTPATIENT
Start: 2025-07-01 | End: 2025-07-31

## 2025-07-01 NOTE — PROGRESS NOTES
LOV 06/02/2025   RTO 07/07/2025  LRF 06/02/2025          Controlled Substance Monitoring:    Acute and Chronic Pain Monitoring:   RX Monitoring Attestation Periodic Controlled Substance Monitoring   12/7/2017  10:27 AM The Prescription Monitoring Report for this patient was reviewed today. No signs of potential drug abuse or diversion identified.            OARRS reviewed prior to refilling medications.   Medications on report are consistent with treatment plan.    UDS 5/2/25  Med contract 5/2/25  *Only refilling as appointment was moved due to PCP schedule change    ANA LAURA Jackson-CNP

## 2025-07-07 NOTE — PROGRESS NOTES
Luz Dhaliwal is a 59 y.o. female who presents in office today with Self medication specific follow up    Chief Complaint   Patient presents with    Weight Management     Patient states she has no concerns and the medication is working well.         History of Present Illness:     HPI    Here for 2 month adipex follow up.  She has lost 11+ 8 = 19 lbs. From initial weight of 296 lbs (was 306 at home), 5% weight loss goal is 13.45 lbs.   Minimal medication side effects. Vitals without concern 122/82, HR 72. Does endorse some dry mouth.   She is working on healthy eating.    PDMP reviewed.     Care gaps:   Colon CA screening: Colonoscopy   Vaccines:   Hepatitis C/HIV screens: non-reactive   Breast CA screening: due   OB/GYN, cervical CA screening: Status post hysterectomy  Depression Screenin    Health Maintenance Due   Topic Date Due    Hepatitis B vaccine (1 of 3 - 19+ 3-dose series) Never done    Pneumococcal 50+ years Vaccine (1 of 1 - PCV) Never done        Patient Care Team:  Charlene Roman APRN - CNP as PCP - General (Nurse Practitioner)  Charlene Roman APRN - CNP as PCP - Empaneled Provider  Makruski, Laisha, RN as Care Transitions Nurse    Reviewed     [x] Past Medical, Family, and Social History was reviewed per writer and does contribute to the patient presenting condition.    [x] Laboratory Results, Vital signs, Imaging, Active Problems, Immunizations, Current/Recently Discontinued Medications, Health Maintenance Activities Due, Referral Notes (if available) were reviewed per writer     [x] Reviewed Depression screening if taken or valid today or any other valid screening tool (others seen below) Interpretation of Total Score DepressionSeverity: 1-4 = Minimal depression, 5-9 = Mild depression, 10-14 = Moderate depression, 15-19 = Moderately severe depression, 20-27 =Severe depression        2025     8:51 AM 3/8/2024     3:41 PM 5/3/2023     8:04 AM 2022    10:59 AM

## 2025-07-14 ENCOUNTER — OFFICE VISIT (OUTPATIENT)
Dept: FAMILY MEDICINE CLINIC | Age: 60
End: 2025-07-14
Payer: COMMERCIAL

## 2025-07-14 VITALS
OXYGEN SATURATION: 99 % | HEIGHT: 60 IN | BODY MASS INDEX: 54.54 KG/M2 | WEIGHT: 277.8 LBS | TEMPERATURE: 97.9 F | DIASTOLIC BLOOD PRESSURE: 82 MMHG | SYSTOLIC BLOOD PRESSURE: 122 MMHG | HEART RATE: 72 BPM

## 2025-07-14 DIAGNOSIS — E66.813 CLASS 3 SEVERE OBESITY DUE TO EXCESS CALORIES WITHOUT SERIOUS COMORBIDITY WITH BODY MASS INDEX (BMI) OF 50.0 TO 59.9 IN ADULT (HCC): Primary | ICD-10-CM

## 2025-07-14 PROCEDURE — 99213 OFFICE O/P EST LOW 20 MIN: CPT

## 2025-07-14 PROCEDURE — G2211 COMPLEX E/M VISIT ADD ON: HCPCS

## 2025-07-24 DIAGNOSIS — E78.2 MIXED HYPERLIPIDEMIA: ICD-10-CM

## 2025-07-24 RX ORDER — ATORVASTATIN CALCIUM 20 MG/1
20 TABLET, FILM COATED ORAL DAILY
Qty: 90 TABLET | Refills: 0 | Status: SHIPPED | OUTPATIENT
Start: 2025-07-24 | End: 2026-07-24

## 2025-07-24 NOTE — TELEPHONE ENCOUNTER
LOV 7/14/25   RTO 8/14/25  LRF 4/9/25          Controlled Substance Monitoring:    Acute and Chronic Pain Monitoring:   RX Monitoring Attestation Periodic Controlled Substance Monitoring   12/7/2017  10:27 AM The Prescription Monitoring Report for this patient was reviewed today. No signs of potential drug abuse or diversion identified.

## 2025-07-31 DIAGNOSIS — E66.813 CLASS 3 SEVERE OBESITY DUE TO EXCESS CALORIES WITHOUT SERIOUS COMORBIDITY WITH BODY MASS INDEX (BMI) OF 50.0 TO 59.9 IN ADULT (HCC): ICD-10-CM

## 2025-08-01 RX ORDER — PHENTERMINE HYDROCHLORIDE 37.5 MG/1
37.5 TABLET ORAL
Qty: 30 TABLET | Refills: 0 | Status: SHIPPED | OUTPATIENT
Start: 2025-08-01 | End: 2025-08-31

## 2025-08-01 NOTE — TELEPHONE ENCOUNTER
LOV 7/14/25   RTO 8/14/25  LRF 6/2/25          Controlled Substance Monitoring:    Acute and Chronic Pain Monitoring:   RX Monitoring Attestation Periodic Controlled Substance Monitoring   12/7/2017  10:27 AM The Prescription Monitoring Report for this patient was reviewed today. No signs of potential drug abuse or diversion identified.           UDS 5/2/2025  Med contract 5/2/2025  [place]AR[place] reviewed prior to refilling medications.   Medications on report are consistent with treatment plan.        ANA LAURA Jackson-CNP

## 2025-08-22 ENCOUNTER — TELEMEDICINE ON DEMAND (OUTPATIENT)
Age: 60
End: 2025-08-22
Payer: COMMERCIAL

## 2025-08-22 DIAGNOSIS — L08.9 SKIN INFECTION: Primary | ICD-10-CM

## 2025-08-22 DIAGNOSIS — L03.311 CELLULITIS OF ABDOMINAL WALL: ICD-10-CM

## 2025-08-22 PROCEDURE — 99213 OFFICE O/P EST LOW 20 MIN: CPT | Performed by: NURSE PRACTITIONER

## 2025-08-22 RX ORDER — DOXYCYCLINE 100 MG/1
100 CAPSULE ORAL 2 TIMES DAILY
Qty: 14 CAPSULE | Refills: 0 | Status: SHIPPED | OUTPATIENT
Start: 2025-08-22 | End: 2025-08-29

## 2025-08-22 RX ORDER — MUPIROCIN 2 %
OINTMENT (GRAM) TOPICAL
Qty: 30 G | Refills: 0 | Status: SHIPPED | OUTPATIENT
Start: 2025-08-22 | End: 2025-08-29

## 2025-09-04 ENCOUNTER — OFFICE VISIT (OUTPATIENT)
Dept: FAMILY MEDICINE CLINIC | Age: 60
End: 2025-09-04
Payer: COMMERCIAL

## 2025-09-04 VITALS
HEIGHT: 61 IN | BODY MASS INDEX: 51.54 KG/M2 | HEART RATE: 77 BPM | WEIGHT: 273 LBS | OXYGEN SATURATION: 97 % | RESPIRATION RATE: 18 BRPM | DIASTOLIC BLOOD PRESSURE: 78 MMHG | SYSTOLIC BLOOD PRESSURE: 114 MMHG

## 2025-09-04 DIAGNOSIS — E66.813 CLASS 3 SEVERE OBESITY DUE TO EXCESS CALORIES WITHOUT SERIOUS COMORBIDITY WITH BODY MASS INDEX (BMI) OF 50.0 TO 59.9 IN ADULT (HCC): Primary | ICD-10-CM

## 2025-09-04 DIAGNOSIS — R59.0 LYMPHADENOPATHY, AXILLARY: ICD-10-CM

## 2025-09-04 PROCEDURE — 99213 OFFICE O/P EST LOW 20 MIN: CPT

## 2025-09-04 RX ORDER — CLOTRIMAZOLE AND BETAMETHASONE DIPROPIONATE 10; .64 MG/G; MG/G
CREAM TOPICAL
Qty: 45 G | Refills: 2 | Status: SHIPPED | OUTPATIENT
Start: 2025-09-04

## 2025-09-04 RX ORDER — PHENTERMINE HYDROCHLORIDE 37.5 MG/1
37.5 TABLET ORAL
Qty: 30 TABLET | Refills: 0 | Status: CANCELLED | OUTPATIENT
Start: 2025-09-04 | End: 2025-10-04

## 2025-09-04 RX ORDER — PHENTERMINE HYDROCHLORIDE 37.5 MG/1
37.5 TABLET ORAL
Qty: 30 TABLET | Refills: 0 | Status: SHIPPED | OUTPATIENT
Start: 2025-09-04 | End: 2025-10-04

## 2025-09-04 ASSESSMENT — PATIENT HEALTH QUESTIONNAIRE - PHQ9
SUM OF ALL RESPONSES TO PHQ QUESTIONS 1-9: 0
1. LITTLE INTEREST OR PLEASURE IN DOING THINGS: NOT AT ALL
2. FEELING DOWN, DEPRESSED OR HOPELESS: NOT AT ALL
SUM OF ALL RESPONSES TO PHQ QUESTIONS 1-9: 0

## (undated) DEVICE — FORCEPS BX L240CM WRK CHN 2.8MM STD CAP W/ NDL MIC MESH